# Patient Record
Sex: FEMALE | Race: WHITE | NOT HISPANIC OR LATINO | URBAN - METROPOLITAN AREA
[De-identification: names, ages, dates, MRNs, and addresses within clinical notes are randomized per-mention and may not be internally consistent; named-entity substitution may affect disease eponyms.]

---

## 2018-03-28 ENCOUNTER — EMERGENCY (EMERGENCY)
Facility: HOSPITAL | Age: 83
LOS: 1 days | Discharge: HOME | End: 2018-03-28
Attending: EMERGENCY MEDICINE | Admitting: EMERGENCY MEDICINE
Payer: MEDICARE

## 2018-03-28 VITALS
HEART RATE: 93 BPM | RESPIRATION RATE: 14 BRPM | SYSTOLIC BLOOD PRESSURE: 152 MMHG | OXYGEN SATURATION: 96 % | HEIGHT: 68 IN | WEIGHT: 115.96 LBS | DIASTOLIC BLOOD PRESSURE: 81 MMHG | TEMPERATURE: 98 F

## 2018-03-28 PROCEDURE — 70450 CT HEAD/BRAIN W/O DYE: CPT

## 2018-03-28 PROCEDURE — 72125 CT NECK SPINE W/O DYE: CPT

## 2018-03-28 PROCEDURE — 99284 EMERGENCY DEPT VISIT MOD MDM: CPT | Mod: 25

## 2018-03-28 PROCEDURE — 70450 CT HEAD/BRAIN W/O DYE: CPT | Mod: 26

## 2018-03-28 PROCEDURE — 72125 CT NECK SPINE W/O DYE: CPT | Mod: 26

## 2018-03-28 PROCEDURE — 99285 EMERGENCY DEPT VISIT HI MDM: CPT

## 2018-03-28 RX ORDER — TRAMADOL HYDROCHLORIDE 50 MG/1
50 TABLET ORAL ONCE
Qty: 0 | Refills: 0 | Status: DISCONTINUED | OUTPATIENT
Start: 2018-03-28 | End: 2018-03-28

## 2018-03-28 RX ADMIN — TRAMADOL HYDROCHLORIDE 50 MILLIGRAM(S): 50 TABLET ORAL at 13:47

## 2018-03-28 NOTE — ED PROVIDER NOTE - OBJECTIVE STATEMENT
83 y/o F pt with hx of dementia, HTN, depression, anxiety   presents to ED BIBA from [facility] for head pain, swelling to left cheek. Per nursing home they are unaware if pt fell. Hx limited from pt secondary to hx of dementia.

## 2018-03-28 NOTE — ED PROVIDER NOTE - CARE PLAN
Principal Discharge DX:	Chronic intractable headache, unspecified headache type  Goal:	evaluate the etiology of headache correlate with clinical and imaging study.

## 2018-03-28 NOTE — ED ADULT TRIAGE NOTE - CHIEF COMPLAINT QUOTE
"Patient c/o back and neck pain and left cheek is swollen per transfer sheet"  patient has dementia.

## 2018-03-28 NOTE — ED ADULT NURSE NOTE - OBJECTIVE STATEMENT
patient is from [facility], c/o neck and head pain, unable to say she has pain but when her bed's head of bed moved, she is crying and guarding, MD at bedside, Pt is in no acute distress. Pt's vital sign is within normal range. patient is afebrile, will continue to monitor.

## 2018-11-14 ENCOUNTER — INPATIENT (INPATIENT)
Facility: HOSPITAL | Age: 83
LOS: 15 days | Discharge: OTHER INSTITUTION | DRG: 300 | End: 2018-11-30
Attending: INTERNAL MEDICINE | Admitting: INTERNAL MEDICINE
Payer: MEDICARE

## 2018-11-14 VITALS
SYSTOLIC BLOOD PRESSURE: 148 MMHG | DIASTOLIC BLOOD PRESSURE: 83 MMHG | HEART RATE: 86 BPM | TEMPERATURE: 101 F | RESPIRATION RATE: 16 BRPM | OXYGEN SATURATION: 98 %

## 2018-11-14 DIAGNOSIS — R09.89 OTHER SPECIFIED SYMPTOMS AND SIGNS INVOLVING THE CIRCULATORY AND RESPIRATORY SYSTEMS: ICD-10-CM

## 2018-11-14 DIAGNOSIS — I82.409 ACUTE EMBOLISM AND THROMBOSIS OF UNSPECIFIED DEEP VEINS OF UNSPECIFIED LOWER EXTREMITY: ICD-10-CM

## 2018-11-14 LAB
ALBUMIN SERPL ELPH-MCNC: 2.9 G/DL — LOW (ref 3.3–5)
ALP SERPL-CCNC: 95 U/L — SIGNIFICANT CHANGE UP (ref 40–120)
ALT FLD-CCNC: 17 U/L — SIGNIFICANT CHANGE UP (ref 12–78)
ANION GAP SERPL CALC-SCNC: 9 MMOL/L — SIGNIFICANT CHANGE UP (ref 5–17)
APPEARANCE UR: CLEAR — SIGNIFICANT CHANGE UP
APTT BLD: 27.9 SEC — LOW (ref 28.5–37)
AST SERPL-CCNC: 23 U/L — SIGNIFICANT CHANGE UP (ref 15–37)
BACTERIA # UR AUTO: ABNORMAL
BASOPHILS # BLD AUTO: 0.03 K/UL — SIGNIFICANT CHANGE UP (ref 0–0.2)
BASOPHILS NFR BLD AUTO: 0.4 % — SIGNIFICANT CHANGE UP (ref 0–2)
BILIRUB SERPL-MCNC: 0.5 MG/DL — SIGNIFICANT CHANGE UP (ref 0.2–1.2)
BILIRUB UR-MCNC: NEGATIVE — SIGNIFICANT CHANGE UP
BUN SERPL-MCNC: 15 MG/DL — SIGNIFICANT CHANGE UP (ref 7–23)
CALCIUM SERPL-MCNC: 8.4 MG/DL — LOW (ref 8.5–10.1)
CHLORIDE SERPL-SCNC: 105 MMOL/L — SIGNIFICANT CHANGE UP (ref 96–108)
CO2 SERPL-SCNC: 26 MMOL/L — SIGNIFICANT CHANGE UP (ref 22–31)
COLOR SPEC: YELLOW — SIGNIFICANT CHANGE UP
CREAT SERPL-MCNC: 1 MG/DL — SIGNIFICANT CHANGE UP (ref 0.5–1.3)
DIFF PNL FLD: NEGATIVE — SIGNIFICANT CHANGE UP
EOSINOPHIL # BLD AUTO: 0.16 K/UL — SIGNIFICANT CHANGE UP (ref 0–0.5)
EOSINOPHIL NFR BLD AUTO: 2.3 % — SIGNIFICANT CHANGE UP (ref 0–6)
EPI CELLS # UR: SIGNIFICANT CHANGE UP
GLUCOSE SERPL-MCNC: 101 MG/DL — HIGH (ref 70–99)
GLUCOSE UR QL: NEGATIVE — SIGNIFICANT CHANGE UP
HCT VFR BLD CALC: 31.4 % — LOW (ref 34.5–45)
HGB BLD-MCNC: 10.9 G/DL — LOW (ref 11.5–15.5)
IMM GRANULOCYTES NFR BLD AUTO: 0.7 % — SIGNIFICANT CHANGE UP (ref 0–1.5)
INR BLD: 1.14 RATIO — SIGNIFICANT CHANGE UP (ref 0.88–1.16)
KETONES UR-MCNC: NEGATIVE — SIGNIFICANT CHANGE UP
LACTATE SERPL-SCNC: 1.3 MMOL/L — SIGNIFICANT CHANGE UP (ref 0.7–2)
LACTATE SERPL-SCNC: 2.4 MMOL/L — HIGH (ref 0.7–2)
LEUKOCYTE ESTERASE UR-ACNC: ABNORMAL
LYMPHOCYTES # BLD AUTO: 1.03 K/UL — SIGNIFICANT CHANGE UP (ref 1–3.3)
LYMPHOCYTES # BLD AUTO: 15.1 % — SIGNIFICANT CHANGE UP (ref 13–44)
MCHC RBC-ENTMCNC: 34.7 GM/DL — SIGNIFICANT CHANGE UP (ref 32–36)
MCHC RBC-ENTMCNC: 34.8 PG — HIGH (ref 27–34)
MCV RBC AUTO: 100.3 FL — HIGH (ref 80–100)
MONOCYTES # BLD AUTO: 0.98 K/UL — HIGH (ref 0–0.9)
MONOCYTES NFR BLD AUTO: 14.4 % — HIGH (ref 2–14)
NEUTROPHILS # BLD AUTO: 4.56 K/UL — SIGNIFICANT CHANGE UP (ref 1.8–7.4)
NEUTROPHILS NFR BLD AUTO: 67.1 % — SIGNIFICANT CHANGE UP (ref 43–77)
NITRITE UR-MCNC: NEGATIVE — SIGNIFICANT CHANGE UP
PH UR: 7 — SIGNIFICANT CHANGE UP (ref 5–8)
PLATELET # BLD AUTO: 222 K/UL — SIGNIFICANT CHANGE UP (ref 150–400)
POTASSIUM SERPL-MCNC: 3.8 MMOL/L — SIGNIFICANT CHANGE UP (ref 3.5–5.3)
POTASSIUM SERPL-SCNC: 3.8 MMOL/L — SIGNIFICANT CHANGE UP (ref 3.5–5.3)
PROT SERPL-MCNC: 6.9 G/DL — SIGNIFICANT CHANGE UP (ref 6–8.3)
PROT UR-MCNC: 25 MG/DL
PROTHROM AB SERPL-ACNC: 13.1 SEC — HIGH (ref 10–12.9)
RBC # BLD: 3.13 M/UL — LOW (ref 3.8–5.2)
RBC # FLD: 13.1 % — SIGNIFICANT CHANGE UP (ref 10.3–14.5)
SODIUM SERPL-SCNC: 140 MMOL/L — SIGNIFICANT CHANGE UP (ref 135–145)
SP GR SPEC: 1.01 — SIGNIFICANT CHANGE UP (ref 1.01–1.02)
UROBILINOGEN FLD QL: 1
WBC # BLD: 6.81 K/UL — SIGNIFICANT CHANGE UP (ref 3.8–10.5)
WBC # FLD AUTO: 6.81 K/UL — SIGNIFICANT CHANGE UP (ref 3.8–10.5)
WBC UR QL: SIGNIFICANT CHANGE UP

## 2018-11-14 PROCEDURE — 70450 CT HEAD/BRAIN W/O DYE: CPT | Mod: 26

## 2018-11-14 PROCEDURE — 93971 EXTREMITY STUDY: CPT | Mod: 26,RT

## 2018-11-14 PROCEDURE — 99285 EMERGENCY DEPT VISIT HI MDM: CPT

## 2018-11-14 PROCEDURE — 93010 ELECTROCARDIOGRAM REPORT: CPT

## 2018-11-14 PROCEDURE — 71045 X-RAY EXAM CHEST 1 VIEW: CPT | Mod: 26

## 2018-11-14 PROCEDURE — 72125 CT NECK SPINE W/O DYE: CPT | Mod: 26

## 2018-11-14 PROCEDURE — 71275 CT ANGIOGRAPHY CHEST: CPT | Mod: 26

## 2018-11-14 PROCEDURE — 70486 CT MAXILLOFACIAL W/O DYE: CPT | Mod: 26

## 2018-11-14 RX ORDER — PIPERACILLIN AND TAZOBACTAM 4; .5 G/20ML; G/20ML
3.38 INJECTION, POWDER, LYOPHILIZED, FOR SOLUTION INTRAVENOUS ONCE
Qty: 0 | Refills: 0 | Status: COMPLETED | OUTPATIENT
Start: 2018-11-14 | End: 2018-11-14

## 2018-11-14 RX ORDER — PIPERACILLIN AND TAZOBACTAM 4; .5 G/20ML; G/20ML
3.38 INJECTION, POWDER, LYOPHILIZED, FOR SOLUTION INTRAVENOUS EVERY 8 HOURS
Qty: 0 | Refills: 0 | Status: DISCONTINUED | OUTPATIENT
Start: 2018-11-14 | End: 2018-11-15

## 2018-11-14 RX ORDER — PANTOPRAZOLE SODIUM 20 MG/1
40 TABLET, DELAYED RELEASE ORAL
Qty: 0 | Refills: 0 | Status: DISCONTINUED | OUTPATIENT
Start: 2018-11-14 | End: 2018-11-28

## 2018-11-14 RX ORDER — SERTRALINE 25 MG/1
25 TABLET, FILM COATED ORAL DAILY
Qty: 0 | Refills: 0 | Status: DISCONTINUED | OUTPATIENT
Start: 2018-11-14 | End: 2018-11-30

## 2018-11-14 RX ORDER — HALOPERIDOL DECANOATE 100 MG/ML
5 INJECTION INTRAMUSCULAR ONCE
Qty: 0 | Refills: 0 | Status: COMPLETED | OUTPATIENT
Start: 2018-11-14 | End: 2018-11-14

## 2018-11-14 RX ORDER — ENOXAPARIN SODIUM 100 MG/ML
50 INJECTION SUBCUTANEOUS ONCE
Qty: 0 | Refills: 0 | Status: COMPLETED | OUTPATIENT
Start: 2018-11-14 | End: 2018-11-14

## 2018-11-14 RX ORDER — SERTRALINE 25 MG/1
50 TABLET, FILM COATED ORAL DAILY
Qty: 0 | Refills: 0 | Status: DISCONTINUED | OUTPATIENT
Start: 2018-11-14 | End: 2018-11-30

## 2018-11-14 RX ORDER — MIRTAZAPINE 45 MG/1
1 TABLET, ORALLY DISINTEGRATING ORAL
Qty: 0 | Refills: 0 | COMMUNITY

## 2018-11-14 RX ORDER — ENOXAPARIN SODIUM 100 MG/ML
50 INJECTION SUBCUTANEOUS
Qty: 0 | Refills: 0 | Status: DISCONTINUED | OUTPATIENT
Start: 2018-11-15 | End: 2018-11-16

## 2018-11-14 RX ORDER — MIRTAZAPINE 45 MG/1
15 TABLET, ORALLY DISINTEGRATING ORAL AT BEDTIME
Qty: 0 | Refills: 0 | Status: DISCONTINUED | OUTPATIENT
Start: 2018-11-14 | End: 2018-11-30

## 2018-11-14 RX ORDER — DOCUSATE SODIUM 100 MG
100 CAPSULE ORAL
Qty: 0 | Refills: 0 | Status: DISCONTINUED | OUTPATIENT
Start: 2018-11-14 | End: 2018-11-30

## 2018-11-14 RX ORDER — VANCOMYCIN HCL 1 G
1000 VIAL (EA) INTRAVENOUS ONCE
Qty: 0 | Refills: 0 | Status: COMPLETED | OUTPATIENT
Start: 2018-11-14 | End: 2018-11-14

## 2018-11-14 RX ORDER — VANCOMYCIN HCL 1 G
1000 VIAL (EA) INTRAVENOUS EVERY 12 HOURS
Qty: 0 | Refills: 0 | Status: DISCONTINUED | OUTPATIENT
Start: 2018-11-14 | End: 2018-11-14

## 2018-11-14 RX ORDER — ENOXAPARIN SODIUM 100 MG/ML
50 INJECTION SUBCUTANEOUS ONCE
Qty: 0 | Refills: 0 | Status: DISCONTINUED | OUTPATIENT
Start: 2018-11-14 | End: 2018-11-14

## 2018-11-14 RX ORDER — SODIUM CHLORIDE 9 MG/ML
1000 INJECTION INTRAMUSCULAR; INTRAVENOUS; SUBCUTANEOUS ONCE
Qty: 0 | Refills: 0 | Status: COMPLETED | OUTPATIENT
Start: 2018-11-14 | End: 2018-11-14

## 2018-11-14 RX ORDER — VANCOMYCIN HCL 1 G
1000 VIAL (EA) INTRAVENOUS EVERY 24 HOURS
Qty: 0 | Refills: 0 | Status: DISCONTINUED | OUTPATIENT
Start: 2018-11-15 | End: 2018-11-15

## 2018-11-14 RX ORDER — AMPICILLIN SODIUM AND SULBACTAM SODIUM 250; 125 MG/ML; MG/ML
3 INJECTION, POWDER, FOR SUSPENSION INTRAMUSCULAR; INTRAVENOUS ONCE
Qty: 0 | Refills: 0 | Status: DISCONTINUED | OUTPATIENT
Start: 2018-11-14 | End: 2018-11-14

## 2018-11-14 RX ADMIN — HALOPERIDOL DECANOATE 5 MILLIGRAM(S): 100 INJECTION INTRAMUSCULAR at 20:01

## 2018-11-14 RX ADMIN — PIPERACILLIN AND TAZOBACTAM 3.38 GRAM(S): 4; .5 INJECTION, POWDER, LYOPHILIZED, FOR SOLUTION INTRAVENOUS at 19:04

## 2018-11-14 RX ADMIN — SODIUM CHLORIDE 1000 MILLILITER(S): 9 INJECTION INTRAMUSCULAR; INTRAVENOUS; SUBCUTANEOUS at 20:03

## 2018-11-14 RX ADMIN — SODIUM CHLORIDE 1000 MILLILITER(S): 9 INJECTION INTRAMUSCULAR; INTRAVENOUS; SUBCUTANEOUS at 18:34

## 2018-11-14 RX ADMIN — ENOXAPARIN SODIUM 50 MILLIGRAM(S): 100 INJECTION SUBCUTANEOUS at 20:01

## 2018-11-14 RX ADMIN — PIPERACILLIN AND TAZOBACTAM 200 GRAM(S): 4; .5 INJECTION, POWDER, LYOPHILIZED, FOR SOLUTION INTRAVENOUS at 18:34

## 2018-11-14 RX ADMIN — Medication 250 MILLIGRAM(S): at 23:41

## 2018-11-14 NOTE — H&P ADULT - HISTORY OF PRESENT ILLNESS
pt is a 81yo female with pm[facility]x of [facility] ems reports pt tripped over someone else's walker, pt walks without assistance at baseline. pt found to be febrile on arrival 100.9 Admitted for septic workup and evaluation,send blood and urine cx,serial lactate levels,monitor vitals closley,ivfs hydration,monitor urine output and renal profile,iv abx initiated  pt without complaints. Daughter is at bedside and states patient has a steady gait Found to have DVT and started on full dose AC

## 2018-11-14 NOTE — ED PROVIDER NOTE - ATTENDING CONTRIBUTION TO CARE
81 yo female hx of dementia, sent from [place] s/p fall, found to be febrile 100.9, here with family c/o right lower leg swelling and redness.  Unable to obtain history from patient secondary to dementia    Gen: demented  Head/eyes: NC/AT  ENT: airway patent  Neck: supple, no tenderness/meningismus/JVD, Trachea midline  Pulm: Bilateral clear BS, normal resp effort, no wheeze/stridor/retractions  CV: RRR, no M/R/G, +2 dist pulses (radial, pedal DP/PT, popliteal)  Abd: soft, NT/ND, +BS, no guarding/rebound tenderness  Musculoskeletal: no edema/erythema/cyanosis, FROM in all extremities, no C/T/L spine ttp  Skin: RLE +swelling, +erythema + warmth  Neuro: demented, moving all extremities    US +right LE DVT, CTA chest +right lower lobe pna, IV abx, lovenox, admit

## 2018-11-14 NOTE — ED PROVIDER NOTE - OBJECTIVE STATEMENT
pt is a 81yo [facility] ems reports pt tripped over someone else's walker, pt walks without assistance at baseline. pt found to be febrile on arrival 100.9. pt without complaints.   pmd: irving

## 2018-11-14 NOTE — ED ADULT NURSE NOTE - OBJECTIVE STATEMENT
Assumed patient care @ 1820. Pt received sitting on stretcher in no apparent distress. Pt AOx1 to self s/p fall, sent by [facility] for fever and fall. Patient with swelling to the left cheek and right lower extremity with redness. Patient follows commands but does not answer questions appropriately. Lungs clear to ausculation, respirations even unlabored. Abd soft non tender, + bowel sounds x 4quadrants. No deformity, Nausea, Vomiting, Diarrhea. Skin warm, dry, color appropriate for age and race except redness to  RLE with swelling and warmth.

## 2018-11-14 NOTE — H&P ADULT - NSHPLABSRESULTS_GEN_ALL_CORE
10.9   6.81  )-----------( 222      ( 2018 18:30 )             31.4       CBC Full  -  ( 2018 18:30 )  WBC Count : 6.81 K/uL  Hemoglobin : 10.9 g/dL  Hematocrit : 31.4 %  Platelet Count - Automated : 222 K/uL  Mean Cell Volume : 100.3 fl  Mean Cell Hemoglobin : 34.8 pg  Mean Cell Hemoglobin Concentration : 34.7 gm/dL  Auto Neutrophil # : 4.56 K/uL  Auto Lymphocyte # : 1.03 K/uL  Auto Monocyte # : 0.98 K/uL  Auto Eosinophil # : 0.16 K/uL  Auto Basophil # : 0.03 K/uL  Auto Neutrophil % : 67.1 %  Auto Lymphocyte % : 15.1 %  Auto Monocyte % : 14.4 %  Auto Eosinophil % : 2.3 %  Auto Basophil % : 0.4 %          140  |  105  |  15  ----------------------------<  101<H>  3.8   |  26  |  1.00    Ca    8.4<L>      2018 18:30    TPro  6.9  /  Alb  2.9<L>  /  TBili  0.5  /  DBili  x   /  AST  23  /  ALT  17  /  AlkPhos  95  11-14      CAPILLARY BLOOD GLUCOSE          Vital Signs Last 24 Hrs  T(C): 38.3 (2018 18:00), Max: 38.3 (2018 18:00)  T(F): 100.9 (2018 18:00), Max: 100.9 (2018 18:00)  HR: 86 (2018 18:00) (86 - 86)  BP: 148/83 (2018 18:00) (148/83 - 148/83)  BP(mean): --  RR: 16 (2018 18:00) (16 - 16)  SpO2: 98% (2018 18:00) (98% - 98%)    Urinalysis Basic - ( 2018 18:37 )    Color: Yellow / Appearance: Clear / S.010 / pH: x  Gluc: x / Ketone: Negative  / Bili: Negative / Urobili: 1   Blood: x / Protein: 25 mg/dL / Nitrite: Negative   Leuk Esterase: Trace / RBC: x / WBC 0-2   Sq Epi: x / Non Sq Epi: Occasional / Bacteria: Occasional        PT/INR - ( 2018 18:30 )   PT: 13.1 sec;   INR: 1.14 ratio         PTT - ( 2018 18:30 )  PTT:27.9 sec

## 2018-11-14 NOTE — ED PROVIDER NOTE - PROGRESS NOTE DETAILS
+ dvt will do cta and follow up. will admit. + dvt will do cta and follow up. will admit. if all neg will give lovenox. attending spoke to dr parra who advised to admit to [facility].

## 2018-11-14 NOTE — H&P ADULT - BREASTS
Pt is here today for counseling on hormone replacement, pt states she has no sex drive, no energy and fat around her abdominal area; also states she is in danger of losing a 17 year relationship due to the low sex drive, had total hysterectomy in August of 2014, is on Estrace 2mg per Dr Garcia, told to see GYN for further adjustments, discussed w/ pt I do not adjust pt's on hormone therapy, will need to see an MD  
not examined

## 2018-11-14 NOTE — H&P ADULT - ASSESSMENT
pt is a 83yo [facility] ems reports pt tripped over someone else's walker, pt walks without assistance at baseline. pt found to be febrile on arrival 100.9 Admitted for septic workup and evaluation,send blood and urine cx,serial lactate levels,monitor vitals closley,ivfs hydration,monitor urine output and renal profile,iv abx initiated  pt without complaints. Daughter is at bedside and states patient has a steady gait Found to have DVT and started on full dose AC

## 2018-11-14 NOTE — H&P ADULT - ATTENDING COMMENTS
pt is a 81yo [facility] ems reports pt tripped over someone else's walker, pt walks without assistance at baseline. pt found to be febrile on arrival 100.9 Admitted for septic workup and evaluation,send blood and urine cx,serial lactate levels,monitor vitals closley,ivfs hydration,monitor urine output and renal profile,iv abx initiated  pt without complaints. Daughter is at bedside and states patient has a steady gait Found to have DVT and started on full dose AC

## 2018-11-14 NOTE — H&P ADULT - REASON FOR ADMISSION
pt is a 83yo female [facility] ems reports pt tripped over someone else's walker, pt walks without assistance at baseline. pt found to be febrile on arrival 100.9 Admitted for septic workup and evaluation,send blood and urine cx,serial lactate levels,monitor vitals closley,ivfs hydration,monitor urine output and renal profile,iv abx initiated  pt without complaints. Daughter is at bedside and states patient has a steady gait Found to have DVT and started on full dose AC

## 2018-11-14 NOTE — ED PROVIDER NOTE - PHYSICAL EXAMINATION
Spine Exam:     Cervical: No erythema, ecchymosis, or visible deformity. No midline tenderness or step-off appreciated on palpation. No paravertebral tenderness. No muscle spasm. FROM. NEG NEXUS criteria.

## 2018-11-14 NOTE — ED ADULT TRIAGE NOTE - CHIEF COMPLAINT QUOTE
From NH, trip and fall over walker, edema to left side of face. Pt found to be febrile upon obtaining vitals

## 2018-11-15 DIAGNOSIS — J18.9 PNEUMONIA, UNSPECIFIED ORGANISM: ICD-10-CM

## 2018-11-15 DIAGNOSIS — Z29.9 ENCOUNTER FOR PROPHYLACTIC MEASURES, UNSPECIFIED: ICD-10-CM

## 2018-11-15 DIAGNOSIS — F03.90 UNSPECIFIED DEMENTIA WITHOUT BEHAVIORAL DISTURBANCE: ICD-10-CM

## 2018-11-15 DIAGNOSIS — F41.9 ANXIETY DISORDER, UNSPECIFIED: ICD-10-CM

## 2018-11-15 DIAGNOSIS — F32.9 MAJOR DEPRESSIVE DISORDER, SINGLE EPISODE, UNSPECIFIED: ICD-10-CM

## 2018-11-15 DIAGNOSIS — I82.409 ACUTE EMBOLISM AND THROMBOSIS OF UNSPECIFIED DEEP VEINS OF UNSPECIFIED LOWER EXTREMITY: ICD-10-CM

## 2018-11-15 DIAGNOSIS — N17.9 ACUTE KIDNEY FAILURE, UNSPECIFIED: ICD-10-CM

## 2018-11-15 DIAGNOSIS — I10 ESSENTIAL (PRIMARY) HYPERTENSION: ICD-10-CM
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
## 2018-11-15 LAB
ALBUMIN SERPL ELPH-MCNC: 2.8 G/DL — LOW (ref 3.3–5)
ALP SERPL-CCNC: 89 U/L — SIGNIFICANT CHANGE UP (ref 40–120)
ALT FLD-CCNC: 17 U/L — SIGNIFICANT CHANGE UP (ref 12–78)
ANION GAP SERPL CALC-SCNC: 10 MMOL/L — SIGNIFICANT CHANGE UP (ref 5–17)
AST SERPL-CCNC: 26 U/L — SIGNIFICANT CHANGE UP (ref 15–37)
BILIRUB SERPL-MCNC: 0.9 MG/DL — SIGNIFICANT CHANGE UP (ref 0.2–1.2)
BUN SERPL-MCNC: 13 MG/DL — SIGNIFICANT CHANGE UP (ref 7–23)
CALCIUM SERPL-MCNC: 8.6 MG/DL — SIGNIFICANT CHANGE UP (ref 8.5–10.1)
CHLORIDE SERPL-SCNC: 110 MMOL/L — HIGH (ref 96–108)
CHOLEST SERPL-MCNC: 161 MG/DL — SIGNIFICANT CHANGE UP (ref 10–199)
CK SERPL-CCNC: 151 U/L — SIGNIFICANT CHANGE UP (ref 26–192)
CO2 SERPL-SCNC: 24 MMOL/L — SIGNIFICANT CHANGE UP (ref 22–31)
CREAT SERPL-MCNC: 1.5 MG/DL — HIGH (ref 0.5–1.3)
CULTURE RESULTS: NO GROWTH — SIGNIFICANT CHANGE UP
GLUCOSE SERPL-MCNC: 130 MG/DL — HIGH (ref 70–99)
HCT VFR BLD CALC: 30 % — LOW (ref 34.5–45)
HDLC SERPL-MCNC: 39 MG/DL — LOW
HGB BLD-MCNC: 10.2 G/DL — LOW (ref 11.5–15.5)
INR BLD: 1.26 RATIO — HIGH (ref 0.88–1.16)
LIDOCAIN IGE QN: 87 U/L — SIGNIFICANT CHANGE UP (ref 73–393)
LIPID PNL WITH DIRECT LDL SERPL: 106 MG/DL — SIGNIFICANT CHANGE UP
MAGNESIUM SERPL-MCNC: 2.2 MG/DL — SIGNIFICANT CHANGE UP (ref 1.6–2.6)
MCHC RBC-ENTMCNC: 34 GM/DL — SIGNIFICANT CHANGE UP (ref 32–36)
MCHC RBC-ENTMCNC: 34.1 PG — HIGH (ref 27–34)
MCV RBC AUTO: 100.3 FL — HIGH (ref 80–100)
NRBC # BLD: 0 /100 WBCS — SIGNIFICANT CHANGE UP (ref 0–0)
PHOSPHATE SERPL-MCNC: 3.2 MG/DL — SIGNIFICANT CHANGE UP (ref 2.5–4.5)
PLATELET # BLD AUTO: 195 K/UL — SIGNIFICANT CHANGE UP (ref 150–400)
POTASSIUM SERPL-MCNC: 3.5 MMOL/L — SIGNIFICANT CHANGE UP (ref 3.5–5.3)
POTASSIUM SERPL-SCNC: 3.5 MMOL/L — SIGNIFICANT CHANGE UP (ref 3.5–5.3)
PROCALCITONIN SERPL-MCNC: <0.05 — SIGNIFICANT CHANGE UP (ref 0–0.04)
PROT SERPL-MCNC: 6.5 G/DL — SIGNIFICANT CHANGE UP (ref 6–8.3)
PROTHROM AB SERPL-ACNC: 14.3 SEC — HIGH (ref 10–12.9)
RBC # BLD: 2.99 M/UL — LOW (ref 3.8–5.2)
RBC # FLD: 13 % — SIGNIFICANT CHANGE UP (ref 10.3–14.5)
SODIUM SERPL-SCNC: 144 MMOL/L — SIGNIFICANT CHANGE UP (ref 135–145)
SPECIMEN SOURCE: SIGNIFICANT CHANGE UP
TOTAL CHOLESTEROL/HDL RATIO MEASUREMENT: 4.1 RATIO — SIGNIFICANT CHANGE UP (ref 3.3–7.1)
TRIGL SERPL-MCNC: 79 MG/DL — SIGNIFICANT CHANGE UP (ref 10–149)
TROPONIN I SERPL-MCNC: 0.04 NG/ML — SIGNIFICANT CHANGE UP (ref 0.01–0.04)
WBC # BLD: 8.64 K/UL — SIGNIFICANT CHANGE UP (ref 3.8–10.5)
WBC # FLD AUTO: 8.64 K/UL — SIGNIFICANT CHANGE UP (ref 3.8–10.5)

## 2018-11-15 RX ORDER — HALOPERIDOL DECANOATE 100 MG/ML
1 INJECTION INTRAMUSCULAR EVERY 6 HOURS
Qty: 0 | Refills: 0 | Status: DISCONTINUED | OUTPATIENT
Start: 2018-11-15 | End: 2018-11-20

## 2018-11-15 RX ORDER — PIPERACILLIN AND TAZOBACTAM 4; .5 G/20ML; G/20ML
3.38 INJECTION, POWDER, LYOPHILIZED, FOR SOLUTION INTRAVENOUS EVERY 8 HOURS
Qty: 0 | Refills: 0 | Status: DISCONTINUED | OUTPATIENT
Start: 2018-11-15 | End: 2018-11-15

## 2018-11-15 RX ADMIN — SERTRALINE 25 MILLIGRAM(S): 25 TABLET, FILM COATED ORAL at 12:06

## 2018-11-15 RX ADMIN — MIRTAZAPINE 15 MILLIGRAM(S): 45 TABLET, ORALLY DISINTEGRATING ORAL at 01:24

## 2018-11-15 RX ADMIN — Medication 100 MILLIGRAM(S): at 18:36

## 2018-11-15 RX ADMIN — HALOPERIDOL DECANOATE 1 MILLIGRAM(S): 100 INJECTION INTRAMUSCULAR at 13:14

## 2018-11-15 RX ADMIN — ENOXAPARIN SODIUM 50 MILLIGRAM(S): 100 INJECTION SUBCUTANEOUS at 06:33

## 2018-11-15 RX ADMIN — ENOXAPARIN SODIUM 50 MILLIGRAM(S): 100 INJECTION SUBCUTANEOUS at 18:36

## 2018-11-15 RX ADMIN — PIPERACILLIN AND TAZOBACTAM 25 GRAM(S): 4; .5 INJECTION, POWDER, LYOPHILIZED, FOR SOLUTION INTRAVENOUS at 01:24

## 2018-11-15 RX ADMIN — PANTOPRAZOLE SODIUM 40 MILLIGRAM(S): 20 TABLET, DELAYED RELEASE ORAL at 06:33

## 2018-11-15 RX ADMIN — SERTRALINE 50 MILLIGRAM(S): 25 TABLET, FILM COATED ORAL at 12:06

## 2018-11-15 RX ADMIN — HALOPERIDOL DECANOATE 1 MILLIGRAM(S): 100 INJECTION INTRAMUSCULAR at 19:36

## 2018-11-15 NOTE — GOALS OF CARE CONVERSATION - PERSONAL ADVANCE DIRECTIVE - CONVERSATION DETAILS
Left message for pts HCP to return call to address advance directives Left message for pts HCP to return call to address advance directives  Spoke with dtr/HCP 11/16/18 she consented to [facility]OLST

## 2018-11-15 NOTE — PROGRESS NOTE ADULT - SUBJECTIVE AND OBJECTIVE BOX
Patient is a 82y Female with past medical history of   increase of BUN/creatinine        presenting with   possible acute kidney injury     who reports no complaints overnight.    REVIEW OF SYSTEMS:    unable to obtain review of system  Allergies    No Known Allergies    Intolerances        MEDICATIONS  (STANDING):  docusate sodium 100 milliGRAM(s) Oral two times a day  enoxaparin Injectable 50 milliGRAM(s) SubCutaneous two times a day  mirtazapine 15 milliGRAM(s) Oral at bedtime  pantoprazole    Tablet 40 milliGRAM(s) Oral before breakfast  sertraline 25 milliGRAM(s) Oral daily  sertraline 50 milliGRAM(s) Oral daily      Vitals:  T(F): 98.6 (11-15-18 @ 14:09), Max: 98.6 (11-15-18 @ 14:09)  HR: 98 (11-15-18 @ 14:09)  BP: 148/81 (11-15-18 @ 14:09)  BP(mean): --  RR: 17 (11-15-18 @ 14:09)  SpO2: 96% (11-15-18 @ 14:09)  Wt(kg): --    I and O's:     @ 07:01  -  11-15 @ 07:00  --------------------------------------------------------  IN: 370 mL / OUT: 0 mL / NET: 370 mL    11-15 @ 07:01  -  11-15 @ 20:16  --------------------------------------------------------  IN: 120 mL / OUT: 0 mL / NET: 120 mL            PHYSICAL EXAM:    Constitutional: NAD,   HEENT:  conjunctiva clear   Neck: No JVD  Respiratory: CTAB  Cardiovascular: S1 and S2  Gastrointestinal: BS+, soft, NT/ND  Extremities: No peripheral edema  Neurological: unable to obtain  Psychiatric: unable to obtain  : No Guzman  Skin: dry  Dialysis Access: Not applicable    LABS:                        10.2   8.64  )-----------( 195      ( 15 Nov 2018 08:19 )             30.0                         10.9   6.81  )-----------( 222      ( 2018 18:30 )             31.4       144    |  110    |  13     ----------------------------<  130       15 Nov 2018 08:19  3.5     |  24     |  1.50     140    |  105    |  15     ----------------------------<  101       2018 18:30  3.8     |  26     |  1.00     Ca    8.6        15 Nov 2018 08:19  Ca    8.4        2018 18:30    Phos  3.2       15 Nov 2018 08:19    Mg     2.2       15 Nov 2018 08:19    TPro  6.5    /  Alb  2.8    /  TBili  0.9    /        15 Nov 2018 08:19  DBili  x      /  AST  26     /  ALT  17     /  AlkPhos  89       TPro  6.9    /  Alb  2.9    /  TBili  0.5    /        2018 18:30  DBili  x      /  AST  23     /  ALT  17     /  AlkPhos  95           Serum Osmo:   Serum Uric Acid:   VIOLET:   Double Stranded DNA:   C3:   C3 Nephritic Factor:   C4:   p-ANCA:   c-ANCA:   Anti-GBM:   SAI-Smith Antibody:   Immunofixation:   [place]/Lambda Free Light Chain:   SPEP:   Hepatitis Panel:   HIV-1/2:     Urine Studies:  Urinalysis Basic - ( 2018 18:37 )    Color: Yellow / Appearance: Clear / S.010 / pH: x  Gluc: x / Ketone: Negative  / Bili: Negative / Urobili: 1   Blood: x / Protein: 25 mg/dL / Nitrite: Negative   Leuk Esterase: Trace / RBC: x / WBC 0-2   Sq Epi: x / Non Sq Epi: Occasional / Bacteria: Occasional        Urine chemistry:   Urine Na:   Urine Creatinine:   Urine Protein/Cr ratio:  Urine K:   Urine Osm:   24 Hr urine studies:     24 hr urine Creatinine Clearance:    RADIOLOGY & ADDITIONAL STUDIES:

## 2018-11-15 NOTE — CONSULT NOTE ADULT - SUBJECTIVE AND OBJECTIVE BOX
CARDIOLOGY CONSULT NOTE    Patient is a 82y Female with a known history of : Admitted with fall and found to have dvt rt leg  Suspected deep vein thrombosis ([id number])    HPI:      REVIEW OF SYSTEMS:    CONSTITUTIONAL: No fever, weight loss, or fatigue  EYES: No eye pain, visual disturbances, or discharge  ENMT:  No difficulty hearing, tinnitus, vertigo; No sinus or throat pain  NECK: No pain or stiffness  BREASTS: No pain, masses, or nipple discharge  RESPIRATORY: No cough, wheezing, chills or hemoptysis; No shortness of breath  CARDIOVASCULAR: No chest pain, palpitations, dizziness, or leg swelling  GASTROINTESTINAL: No abdominal or epigastric pain. No nausea, vomiting, or hematemesis; No diarrhea or constipation. No melena or hematochezia.  GENITOURINARY: No dysuria, frequency, hematuria, or incontinence  NEUROLOGICAL: No headaches, memory loss, loss of strength, numbness, or tremors  SKIN: No itching, burning, rashes, or lesions   LYMPH NODES: No enlarged glands  ENDOCRINE: No heat or cold intolerance; No hair loss  MUSCULOSKELETAL: No joint pain or swelling; No muscle, back, or extremity pain  PSYCHIATRIC: No depression, anxiety, mood swings, or difficulty sleeping  HEME/LYMPH: No easy bruising, or bleeding gums  ALLERGY AND IMMUNOLOGIC: No hives or eczema    MEDICATIONS  (STANDING):  docusate sodium 100 milliGRAM(s) Oral two times a day  enoxaparin Injectable 50 milliGRAM(s) SubCutaneous two times a day  mirtazapine 15 milliGRAM(s) Oral at bedtime  pantoprazole    Tablet 40 milliGRAM(s) Oral before breakfast  piperacillin/tazobactam IVPB. 3.375 Gram(s) IV Intermittent every 8 hours  sertraline 25 milliGRAM(s) Oral daily  sertraline 50 milliGRAM(s) Oral daily  vancomycin  IVPB 1000 milliGRAM(s) IV Intermittent every 24 hours    MEDICATIONS  (PRN):      ALLERGIES: No Known Allergies      Social History:     FAMILY HISTORY:  No pertinent family history in first degree relatives      PAST MEDICAL & SURGICAL HISTORY:  Anxiety  HTN (hypertension)  Depression  Dementia  No significant past surgical history        PHYSICAL EXAMINATION:  -----------------------------  T(C): 36.6 (11-15-18 @ 05:23), Max: 38.3 (11-14-18 @ 18:00)  HR: 84 (11-15-18 @ 05:23) (84 - 100)  BP: 147/69 (11-15-18 @ 05:23) (136/82 - 170/74)  RR: 18 (11-15-18 @ 05:23) (16 - 19)  SpO2: 96% (11-15-18 @ 05:23) (96% - 98%)  Wt(kg): --    11-14 @ 07:01  -  11-15 @ 07:00  --------------------------------------------------------  IN:    IV PiggyBack: 250 mL    Oral Fluid: 120 mL  Total IN: 370 mL    OUT:  Total OUT: 0 mL    Total NET: 370 mL        Height (cm): 172.72 (11-14 @ 18:55)  Weight (kg): 52.6 (11-14 @ 18:55)  BMI (kg/m2): 17.6 (11-14 @ 18:55)  BSA (m2): 1.62 (11-14 @ 18:55)    Constitutional: well developed, normal appearance, well groomed, well nourished, no deformities and no acute distress.   Eyes: the conjunctiva exhibited no abnormalities and the eyelids demonstrated no xanthelasmas.   HEENT: normal oral mucosa, no oral pallor and no oral cyanosis.   Neck: normal jugular venous A waves present, normal jugular venous V waves present and no jugular venous lao A waves.   Pulmonary: no respiratory distress, normal respiratory rhythm and effort, no accessory muscle use and lungs were clear to auscultation bilaterally.   Cardiovascular: heart rate and rhythm were normal, normal S1 and S2 and no murmur, gallop, rub, heave or thrill are present.   Abdomen: soft, non-tender, no hepato-splenomegaly and no abdominal mass palpated.   Musculoskeletal: the gait could not be assessed..   Extremities: no clubbing of the fingernails, no localized cyanosis, no petechial hemorrhages and no ischemic changes.   Skin: normal skin color and pigmentation, no rash, no venous stasis, no skin lesions, no skin ulcer and no xanthoma was observed.   Psychiatric: oriented to person, place, and time, the affect was normal, the mood was normal and not feeling anxious.     LABS:   --------  11-15    144  |  110<H>  |  13  ----------------------------<  130<H>  3.5   |  24  |  1.50<H>    Ca    8.6      15 Nov 2018 08:19  Phos  3.2     11-15  Mg     2.2     11-15    TPro  6.5  /  Alb  2.8<L>  /  TBili  0.9  /  DBili  x   /  AST  26  /  ALT  17  /  AlkPhos  89  11-15                         10.2   8.64  )-----------( 195      ( 15 Nov 2018 08:19 )             30.0     PT/INR - ( 15 Nov 2018 08:19 )   PT: 14.3 sec;   INR: 1.26 ratio         PTT - ( 14 Nov 2018 18:30 )  PTT:27.9 sec    11-15 @ 08:19 CPK total:--, CKMB --, Troponin I - .035 ng/mL          RADIOLOGY:  -----------------        ECG:     ECHO

## 2018-11-15 NOTE — CONSULT NOTE ADULT - SUBJECTIVE AND OBJECTIVE BOX
Patient is a 82y old  Female who presents with a chief complaint of pt is a 81yo female with pmhx of dementia and htn [facility] ems s/p fall. ems reports pt tripped over someone else's walker, pt walks without assistance at baseline. pt found to be febrile on arrival 100.9 Admitted for septic workup and evaluation,send blood and urine cx,serial lactate levels,monitor vitals closley,ivfs hydration,monitor urine output and renal profile,iv abx initiated  pt without complaints. Daughter is at bedside and states patient has a steady gait Found to have DVT and started on full dose AC (15 Nov 2018 20:16)      HPI:  pt is a 81yo female with pmhx of dementia and htn [facility] ems s/p fall. ems reports pt tripped over someone else's walker, pt walks without assistance at baseline. pt found to be febrile on arrival 100.9 Admitted for septic workup and evaluation,send blood and urine cx,serial lactate levels,monitor vitals closley,ivfs hydration,monitor urine output and renal profile,iv abx initiated  pt without complaints. Daughter is at bedside and states patient has a steady gait Found to have DVT and started on full dose AC (14 Nov 2018 21:23)      Asked to see patient for ID Consult    PAST MEDICAL & SURGICAL HISTORY:  Anxiety  HTN (hypertension)  Depression  Dementia  No significant past surgical history      Allergies    No Known Allergies    Intolerances        REVIEW OF SYSTEMS:  All systems below were reviewed and are negative [  ]  HEENT:  ID:  Pulmonary:  Cardiac:  GI:  Renal:  Musculoskeletal:  All other systems above were reviewed and are negative   [  ]    HOME MEDICATIONS:    MEDICATIONS  (STANDING):  docusate sodium 100 milliGRAM(s) Oral two times a day  enoxaparin Injectable 50 milliGRAM(s) SubCutaneous two times a day  mirtazapine 15 milliGRAM(s) Oral at bedtime  pantoprazole    Tablet 40 milliGRAM(s) Oral before breakfast  sertraline 25 milliGRAM(s) Oral daily  sertraline 50 milliGRAM(s) Oral daily    MEDICATIONS  (PRN):  haloperidol    Injectable 1 milliGRAM(s) IntraMuscular every 6 hours PRN Agitation      Vital Signs Last 24 Hrs  T(C): 37.1 (15 Nov 2018 21:54), Max: 37.1 (15 Nov 2018 21:54)  T(F): 98.7 (15 Nov 2018 21:54), Max: 98.7 (15 Nov 2018 21:54)  HR: 102 (15 Nov 2018 21:54) (84 - 102)  BP: 125/68 (15 Nov 2018 21:54) (125/68 - 148/81)  BP(mean): --  RR: 18 (15 Nov 2018 21:54) (17 - 19)  SpO2: 97% (15 Nov 2018 21:54) (96% - 97%)    PHYSICAL EXAM:  HEENT:  Neck:  Lungs:  Heart:  Abdomen:  Genital/ Rectal:  Extremities:  Neurologic:  Vascular:    I&O's Summary    14 Nov 2018 07:01  -  15 Nov 2018 07:00  --------------------------------------------------------  IN: 370 mL / OUT: 0 mL / NET: 370 mL    15 Nov 2018 07:01  -  15 Nov 2018 22:12  --------------------------------------------------------  IN: 120 mL / OUT: 0 mL / NET: 120 mL        LABORATORY:                          10.2   8.64  )-----------( 195      ( 15 Nov 2018 08:19 )             30.0       ESR:                   11-15 @ 08:19  --    C-Reactive Protein:     11-15 @ 08:19  --    Procalcitonin:           11-15 @ 08:19   <0.05      11-15    144  |  110<H>  |  13  ----------------------------<  130<H>  3.5   |  24  |  1.50<H>    Ca    8.6      15 Nov 2018 08:19  Phos  3.2     11-15  Mg     2.2     11-15    TPro  6.5  /  Alb  2.8<L>  /  TBili  0.9  /  DBili  x   /  AST  26  /  ALT  17  /  AlkPhos  89  11-15          LABORATORY:    CBC Full  -  ( 15 Nov 2018 08:19 )  WBC Count : 8.64 K/uL  Hemoglobin : 10.2 g/dL  Hematocrit : 30.0 %  Platelet Count - Automated : 195 K/uL  Mean Cell Volume : 100.3 fl  Mean Cell Hemoglobin : 34.1 pg  Mean Cell Hemoglobin Concentration : 34.0 gm/dL  Auto Neutrophil # : x  Auto Lymphocyte # : x  Auto Monocyte # : x  Auto Eosinophil # : x  Auto Basophil # : x  Auto Neutrophil % : x  Auto Lymphocyte % : x  Auto Monocyte % : x  Auto Eosinophil % : x  Auto Basophil % : x        ESR:                   11-15 @ 08:19  --    C-Reactive Protein:     11-15 @ 08:19  --    Procalcitonin:           11-15 @ 08:19   <0.05      11-15    144  |  110<H>  |  13  ----------------------------<  130<H>  3.5   |  24  |  1.50<H>    Ca    8.6      15 Nov 2018 08:19  Phos  3.2     11-15  Mg     2.2     11-15    TPro  6.5  /  Alb  2.8<L>  /  TBili  0.9  /  DBili  x   /  AST  26  /  ALT  17  /  AlkPhos  89  11-15                    Wound culture:                11-14 @ 21:51  Organism --  Culture w/ gram stain --  Specimen Source .Urine Catheterized        Abscess culture:             11-14 @ 21:51  Organism --  Gram Stain --  Specimen Source .Urine Catheterized            Tissue culture:           11-14 @ 21:51  Organism --  Gram Stain --  Specimen Source .Urine Catheterized        Body Fluid Smear & Culture:                        11-14 @ 21:51  AFB Smear  --  Culture Acid Fast Body Fluid w/ Smear  --  Culture Acid Fast Smear Concentrated   --    Culture Results:       No growth  Specimen Source .Urine Catheterized Patient is a 82y old  Female who presents with a chief complaint of fall      The patient is a 81yo female with a PMH of dementia, HTN, and depression who was brought to the ED after she had a fall at the [facility]. The pt tripped over someone else's walker and fell down to the floor. In the ED She had a fever of 100.9F. She was given IV Zosyn and Vancomycin. Her chest CT angio did not show PE but there was a pleural based RML infiltrate. Her evaluation also showed DVT of RLE.       PAST MEDICAL & SURGICAL HISTORY:  Anxiety  HTN (hypertension)  Depression  Dementia  No significant past surgical history      Allergies    No Known Allergies    Intolerances    REVIEW OF SYSTEMS:    No cough or SOB  No abdominal pain or diarrhea.       HOME MEDICATIONS:    MEDICATIONS  (STANDING):  docusate sodium 100 milliGRAM(s) Oral two times a day  enoxaparin Injectable 50 milliGRAM(s) SubCutaneous two times a day  mirtazapine 15 milliGRAM(s) Oral at bedtime  pantoprazole    Tablet 40 milliGRAM(s) Oral before breakfast  sertraline 25 milliGRAM(s) Oral daily  sertraline 50 milliGRAM(s) Oral daily    MEDICATIONS  (PRN):  haloperidol    Injectable 1 milliGRAM(s) IntraMuscular every 6 hours PRN Agitation      Vital Signs Last 24 Hrs  T(C): 37.1 (15 Nov 2018 21:54), Max: 37.1 (15 Nov 2018 21:54)  T(F): 98.7 (15 Nov 2018 21:54), Max: 98.7 (15 Nov 2018 21:54)  HR: 102 (15 Nov 2018 21:54) (84 - 102)  BP: 125/68 (15 Nov 2018 21:54) (125/68 - 148/81)  BP(mean): --  RR: 18 (15 Nov 2018 21:54) (17 - 19)  SpO2: 97% (15 Nov 2018 21:54) (96% - 97%)    PHYSICAL EXAM:  HEENT: NC/AT  Neck: Soft  Lungs: Decreased BS bilaterally. no wheezing.   Heart: RRR; no murmurs.  Abdomen: Soft; no masses.  Extremities: No ulcers.   Neurologic: No deficits.       I&O's Summary    14 Nov 2018 07:01  -  15 Nov 2018 07:00  --------------------------------------------------------  IN: 370 mL / OUT: 0 mL / NET: 370 mL    15 Nov 2018 07:01  -  15 Nov 2018 22:12  --------------------------------------------------------  IN: 120 mL / OUT: 0 mL / NET: 120 mL      LABORATORY:                       10.2   8.64  )-----------( 195      ( 15 Nov 2018 08:19 )             30.0       ESR:                   11-15 @ 08:19  --    C-Reactive Protein:     11-15 @ 08:19  --    Procalcitonin:           11-15 @ 08:19   <0.05      11-15    144  |  110<H>  |  13  ----------------------------<  130<H>  3.5   |  24  |  1.50<H>    Ca    8.6      15 Nov 2018 08:19  Phos  3.2     11-15  Mg     2.2     11-15    TPro  6.5  /  Alb  2.8<L>  /  TBili  0.9  /  DBili  x   /  AST  26  /  ALT  17  /  AlkPhos  89  11-15      LABORATORY:    CBC Full  -  ( 15 Nov 2018 08:19 )  WBC Count : 8.64 K/uL  Hemoglobin : 10.2 g/dL  Hematocrit : 30.0 %  Platelet Count - Automated : 195 K/uL  Mean Cell Volume : 100.3 fl  Mean Cell Hemoglobin : 34.1 pg  Mean Cell Hemoglobin Concentration : 34.0 gm/dL  Auto Neutrophil # : x  Auto Lymphocyte # : x  Auto Monocyte # : x  Auto Eosinophil # : x  Auto Basophil # : x  Auto Neutrophil % : x  Auto Lymphocyte % : x  Auto Monocyte % : x  Auto Eosinophil % : x  Auto Basophil % : x      C-Reactive Protein:     11-15 @ 08:19  --    Procalcitonin:           11-15 @ 08:19   <0.05      11-15    144  |  110<H>  |  13  ----------------------------<  130<H>  3.5   |  24  |  1.50<H>    Ca    8.6      15 Nov 2018 08:19  Phos  3.2     11-15  Mg     2.2     11-15    TPro  6.5  /  Alb  2.8<L>  /  TBili  0.9  /  DBili  x   /  AST  26  /  ALT  17  /  AlkPhos  89  11-15      Tissue culture:           11-14 @ 21:51  Organism --  Gram Stain --  Specimen Source .Urine Catheterized        Body Fluid Smear & Culture:                        11-14 @ 21:51  AFB Smear  --  Culture Acid Fast Body Fluid w/ Smear  --  Culture Acid Fast Smear Concentrated   --    Culture Results:       No growth  Specimen Source .Urine Catheterized      Assessment and Plan:    1. RML pleural based density.  2. RLE DVT.    . Low suspicion for pneumonia with no cough or SOB.  . Agree to discontinue all antibiotics.  . Pulmonary evaluation.  . Continue Lovenox.    Thank you for the courtesy of this consultation.

## 2018-11-15 NOTE — PROGRESS NOTE ADULT - SUBJECTIVE AND OBJECTIVE BOX
PROGRESS NOTE  Patient is a 82y old  Female who presents with a chief complaint of s/p fall   dvt (15 Nov 2018 08:51)  Chart and available morning labs /imaging are reviewed electronically , urgent issues addressed . More information  is being added upon completion of rounds , when more information is collected and management discussed with consultants , medical staff and social service/case management on the floor   LATE ENTRY- patient was seen and examined earlier today . Plan of care was discussed with  daughter at bedside , med staff and unit coordinator .   OVERNIGHT  No new issues reported by medical staff . All above noted Patient is resting in a bed comfortably .Confused ,poor mentation .No distress noted   On constant observation due to agitation   HPI: pt is a 81yo female with pmhx of dementia and htn [facility] ems s/p fall. ems reports pt tripped over someone else's walker, pt walks without assistance at baseline. pt found to be febrile on arrival 100.9 Admitted for septic workup and evaluation,send blood and urine cx,serial lactate levels,monitor vitals closley,ivfs hydration,monitor urine output and renal profile,iv abx initiated  pt without complaints. Daughter is at bedside and states patient has a steady gait   PAST MEDICAL & SURGICAL HISTORY:  Anxiety  HTN (hypertension)  Depression  Dementia  No significant past surgical history  MEDICATIONS  (STANDING):  docusate sodium 100 milliGRAM(s) Oral two times a day  enoxaparin Injectable 50 milliGRAM(s) SubCutaneous two times a day  mirtazapine 15 milliGRAM(s) Oral at bedtime  pantoprazole    Tablet 40 milliGRAM(s) Oral before breakfast  sertraline 25 milliGRAM(s) Oral daily  sertraline 50 milliGRAM(s) Oral daily  MEDICATIONS  (PRN):  haloperidol    Injectable 1 milliGRAM(s) IntraMuscular every 6 hours PRN Agitation  OBJECTIVE  T(C): 37 (11-15-18 @ 14:09), Max: 38.3 (18 @ 18:00)  HR: 98 (11-15-18 @ 14:09) (84 - 100)  BP: 148/81 (11-15-18 @ 14:09) (136/82 - 170/74)  RR: 17 (11-15-18 @ 14:09) (16 - 19)  SpO2: 96% (11-15-18 @ 14:09) (96% - 98%)  Wt(kg): --  I&O's Summary  2018 07:01  -  15 Nov 2018 07:00  --------------------------------------------------------  IN: 370 mL / OUT: 0 mL / NET: 370 mL  REVIEW OF SYSTEMS:  ROS is unobtainable due to lethargy and confusion   PHYSICAL EXAM:  Appearance: NAD. VS past 24 hrs -as above   HEENT:   Moist oral mucosa. Conjunctiva clear b/l.  L cheeck swelling and left periorbital hematoma   Neck : supple  Respiratory: Lungs CTAB.  Gastrointestinal:  Soft, nontender. No rebound. No rigidity. BS present	  Cardiovascular: RRR ,S1S2 present  Neurologic: Non-focal. Moving all extremities.  Extremities: No edema. No erythema. No calf tenderness.  Skin: No rashes, No ecchymoses, No cyanosis.	  wounds ,skin lesions-See skin assesment flow sheet   LABS:                      10.2   8.64  )-----------( 195      ( 15 Nov 2018 08:19 )             30.0   11-15  144  |  110<H>  |  13  ----------------------------<  130<H>  3.5   |  24  |  1.50<H>  Ca    8.6      15 Nov 2018 08:19  Phos  3.2     11-15  Mg     2.2     11-15  TPro  6.5  /  Alb  2.8<L>  /  TBili  0.9  /  DBili  x   /  AST  26  /  ALT  17  /  AlkPhos  89  11-15  CAPILLARY BLOOD GLUCOSE  PT/INR - ( 15 Nov 2018 08:19 )   PT: 14.3 sec;   INR: 1.26 ratio     PTT - ( 2018 18:30 )  PTT:27.9 sec  Urinalysis Basic - ( 2018 18:37 )  Color: Yellow / Appearance: Clear / S.010 / pH: x  Gluc: x / Ketone: Negative  / Bili: Negative / Urobili: 1   Blood: x / Protein: 25 mg/dL / Nitrite: Negative   Leuk Esterase: Trace / RBC: x / WBC 0-2   Sq Epi: x / Non Sq Epi: Occasional / Bacteria: Occasional  RADIOLOGY & ADDITIONAL TESTS:< from: Xray Chest 1 View- PORTABLE-Routine (18 @ 19:45) >  EXAM:  XR CHEST PORTABLE ROUTINE 1V                        PROCEDURE DATE:  2018    INTERPRETATION:  Clinical information: Fall, pain  Portable study of the chest, 7:49 PM  No prior chest x-rays present for comparison  Mildly elevated right hemidiaphragm. right hemidiaphragm.  The aorta shows atherosclerotic changes. The lungs are clear. There is no   sign of infiltrate effusion or congestive failure. Heart size is within   normal limits. Hilar regions and mediastinal contours are unremarkable.  The bony thorax appears intact.  IMPRESSION:  No active disease.  < end of copied text >  < from: CT Angio Chest w/ IV Cont (18 @ 19:33) >  EXAM:  CT ANGIO CHEST (W)AW IC                        PROCEDURE DATE:  2018    INTERPRETATION:  Clinical information: Right lower extremity deep vein   thrombosis  Axial images obtained, coronal and sagittal images computer reformatted.   MIP images obtained.  68 cc of Omnipaque 350 intravenously administered, 32 cc discarded.  No central pulmonary embolism. No thoracic aortic aneurysm. No   pericardial effusion. Central airway intact. Thyroid gland not enlarged.   Right thyroid lobe calcification present. Linear scarring and/or   atelectasis at the lung bases. Pleural-based opacity in the right middle   lobe. Advise follow-up study.   No acute osseous abnormalities. No adrenal lesions.  IMPRESSION:  No evidence of pulmonary embolism.   Pleural-based opacity in the right middle lobe. Advise follow-up study.  < end of copied text >   reviewed elctronically  ASSESSMENT/PLAN:

## 2018-11-16 ENCOUNTER — TRANSCRIPTION ENCOUNTER (OUTPATIENT)
Age: 83
End: 2018-11-16

## 2018-11-16 LAB
ANION GAP SERPL CALC-SCNC: 11 MMOL/L — SIGNIFICANT CHANGE UP (ref 5–17)
BUN SERPL-MCNC: 9 MG/DL — SIGNIFICANT CHANGE UP (ref 7–23)
CALCIUM SERPL-MCNC: 8.7 MG/DL — SIGNIFICANT CHANGE UP (ref 8.5–10.1)
CHLORIDE SERPL-SCNC: 111 MMOL/L — HIGH (ref 96–108)
CO2 SERPL-SCNC: 24 MMOL/L — SIGNIFICANT CHANGE UP (ref 22–31)
CREAT SERPL-MCNC: 0.92 MG/DL — SIGNIFICANT CHANGE UP (ref 0.5–1.3)
GLUCOSE SERPL-MCNC: 96 MG/DL — SIGNIFICANT CHANGE UP (ref 70–99)
HCT VFR BLD CALC: 29.5 % — LOW (ref 34.5–45)
HGB BLD-MCNC: 10.1 G/DL — LOW (ref 11.5–15.5)
MCHC RBC-ENTMCNC: 34 PG — SIGNIFICANT CHANGE UP (ref 27–34)
MCHC RBC-ENTMCNC: 34.2 GM/DL — SIGNIFICANT CHANGE UP (ref 32–36)
MCV RBC AUTO: 99.3 FL — SIGNIFICANT CHANGE UP (ref 80–100)
NRBC # BLD: 0 /100 WBCS — SIGNIFICANT CHANGE UP (ref 0–0)
PLATELET # BLD AUTO: 199 K/UL — SIGNIFICANT CHANGE UP (ref 150–400)
POTASSIUM SERPL-MCNC: 3 MMOL/L — LOW (ref 3.5–5.3)
POTASSIUM SERPL-SCNC: 3 MMOL/L — LOW (ref 3.5–5.3)
RBC # BLD: 2.97 M/UL — LOW (ref 3.8–5.2)
RBC # FLD: 13.2 % — SIGNIFICANT CHANGE UP (ref 10.3–14.5)
SODIUM SERPL-SCNC: 146 MMOL/L — HIGH (ref 135–145)
URATE SERPL-MCNC: 4.1 MG/DL — SIGNIFICANT CHANGE UP (ref 2.5–7)
WBC # BLD: 7.09 K/UL — SIGNIFICANT CHANGE UP (ref 3.8–10.5)
WBC # FLD AUTO: 7.09 K/UL — SIGNIFICANT CHANGE UP (ref 3.8–10.5)

## 2018-11-16 RX ORDER — FONDAPARINUX SODIUM 2.5 MG/.5ML
1 INJECTION, SOLUTION SUBCUTANEOUS
Qty: 42 | Refills: 0 | OUTPATIENT
Start: 2018-11-16 | End: 2018-12-06

## 2018-11-16 RX ORDER — SERTRALINE 25 MG/1
1 TABLET, FILM COATED ORAL
Qty: 0 | Refills: 0 | COMMUNITY

## 2018-11-16 RX ORDER — POTASSIUM CHLORIDE 20 MEQ
10 PACKET (EA) ORAL
Qty: 0 | Refills: 0 | Status: COMPLETED | OUTPATIENT
Start: 2018-11-16 | End: 2018-11-16

## 2018-11-16 RX ORDER — POTASSIUM CHLORIDE 20 MEQ
40 PACKET (EA) ORAL ONCE
Qty: 0 | Refills: 0 | Status: COMPLETED | OUTPATIENT
Start: 2018-11-16 | End: 2018-11-16

## 2018-11-16 RX ORDER — PANTOPRAZOLE SODIUM 20 MG/1
1 TABLET, DELAYED RELEASE ORAL
Qty: 30 | Refills: 0 | OUTPATIENT
Start: 2018-11-16 | End: 2018-12-15

## 2018-11-16 RX ORDER — RIVAROXABAN 15 MG-20MG
15 KIT ORAL
Qty: 0 | Refills: 0 | Status: DISCONTINUED | OUTPATIENT
Start: 2018-11-17 | End: 2018-11-26

## 2018-11-16 RX ORDER — DOCUSATE SODIUM 100 MG
200 CAPSULE ORAL
Qty: 0 | Refills: 0 | COMMUNITY

## 2018-11-16 RX ORDER — SERTRALINE 25 MG/1
1 TABLET, FILM COATED ORAL
Qty: 30 | Refills: 0 | OUTPATIENT
Start: 2018-11-16 | End: 2018-12-15

## 2018-11-16 RX ORDER — COLCHICINE 0.6 MG
1 TABLET ORAL
Qty: 0 | Refills: 0 | COMMUNITY

## 2018-11-16 RX ORDER — SENNA PLUS 8.6 MG/1
1 TABLET ORAL
Qty: 0 | Refills: 0 | COMMUNITY

## 2018-11-16 RX ADMIN — SERTRALINE 25 MILLIGRAM(S): 25 TABLET, FILM COATED ORAL at 11:18

## 2018-11-16 RX ADMIN — Medication 100 MILLIEQUIVALENT(S): at 20:04

## 2018-11-16 RX ADMIN — Medication 100 MILLIGRAM(S): at 18:19

## 2018-11-16 RX ADMIN — HALOPERIDOL DECANOATE 1 MILLIGRAM(S): 100 INJECTION INTRAMUSCULAR at 01:37

## 2018-11-16 RX ADMIN — Medication 100 MILLIEQUIVALENT(S): at 22:16

## 2018-11-16 RX ADMIN — MIRTAZAPINE 15 MILLIGRAM(S): 45 TABLET, ORALLY DISINTEGRATING ORAL at 22:16

## 2018-11-16 RX ADMIN — ENOXAPARIN SODIUM 50 MILLIGRAM(S): 100 INJECTION SUBCUTANEOUS at 18:18

## 2018-11-16 RX ADMIN — ENOXAPARIN SODIUM 50 MILLIGRAM(S): 100 INJECTION SUBCUTANEOUS at 05:28

## 2018-11-16 RX ADMIN — Medication 100 MILLIEQUIVALENT(S): at 19:38

## 2018-11-16 RX ADMIN — SERTRALINE 50 MILLIGRAM(S): 25 TABLET, FILM COATED ORAL at 11:18

## 2018-11-16 NOTE — DISCHARGE NOTE ADULT - PATIENT PORTAL LINK FT
You can access the [email or web address] Portal, offered by [facility], by registering with the following website: [email or web address]

## 2018-11-16 NOTE — PROGRESS NOTE ADULT - SUBJECTIVE AND OBJECTIVE BOX
On one to One watch Chart reviewed Patient was examined Continue management as per previous pulmonary note recommendations Detailed note will be entered later today in the space [facility] once more data is gathered On one to One watch Chart reviewed Patient was examined Continue management as per previous pulmonary note recommendations Detailed note will be entered later today in the space [facility] once more data is gathered        Adriana Swanson [facility] P   ALLERGY nka  CONTACT Dtr Tameka Campos  Self     VITALS/LABS                           11/16/2018 afeb 105 150/80 18 97%   11/16/2018 W 7 Hb 10 Plt 199 Na 146 K 3 CO2 24 Cr .9     REVIEW OF SYMPTOMS    Able to give ROS  NO     PHYSICAL EXAM    HEENT Unremarkable PERRLA atraumatic   RESP Fair air entry EXP prolonged    Harsh breath sound Resp distres mild   CARDIAC S1 S2 No S3     NO JVD    ABDOMEN SOFT BS PRESENT NOT DISTENDED No hepatosplenomegaly PEDAL EDEMA present No calf tenderness  NO rash   GENERAL Not TOXIC looking    GLOBAL ISSUE/BEST PRACTICE:      PROBLEM: HOB elevation:   y            PROBLEM: Stress ulcer proph:    protonix 40 911/14)                       PROBLEM: VTE prophylaxis:      lvnx 50.2 (11/14)   PROBLEM: Glycemic control:    na  PROBLEM: Nutrition:    watkins (11/14)   PROBLEM: Advanced directive: na     PROBLEM: Allergies:  na  EVENT AGITATION 11/14/2018 Constant observation     PATIENT MANAGEMENT   11/14/2018 ADMISSION [facility] P DR LEVY TY   11/14/2018 83yo female with pmhx of dementia and htn [facility] ems s/p fall. ems reports pt tripped over someone else's walker, pt walks without assistance at baseline. pt found to be febrile on arrival 100.9. pt without complaints.   pmd: irving    Pt was found to have DVT and was started on full dose lovenox 11/14 She was also found to have patchy pl based opacity rml     ASSESSMENT/RECOMMENDATIONS  11/14/2018 ADMISSION [facility] P DR LEVY TY    DVT   11/15/2018 Patient on lovenox Will have to consider changing to Xarelto 15 bid if Creatinine not increasing   Also need to consider if she is high fall risk then we may have to do IVCF   11/16/2018 DW Dr Ty and with family Pt is steady on her feet and this was a trip fall Pros and cons of doac explained to pt family Xarelto agreed upon by Dr Ty     ELEVATED CREAT   Monitor serially     PATCHY OPACITY RML ON CT CHEST DONE 11/14  This could be a pulmonary infarct Will dw IR to see if ctnb is feasible Will need serial followup and will need PET after DC   11/16/2018 I dw Dr Farah He feels that instead of ctnb we should rep[eat CT Chest in 2 m Message sent to Dr Ty     PNEUMONIA   Bacterial pneumonia unlikely in absence of procalcitonin leukocytosis and fever Will DC abio (11/15/2018)    TIME SPENT Over 25 minutes aggregate care time spent on encounter; activities included   direct patient care, counseling and/or coordinating care reviewing notes, lab data/ imaging , discussion with multidisciplinary team/ patient  /family. Risks, benefits, alternatives  discussed in detail.

## 2018-11-16 NOTE — DISCHARGE NOTE ADULT - PLAN OF CARE
resolution of right leg pain and swelling xarelto 15 mg po bid x 21 day and then 20 mg po qd ,followup with hematologist AZAR Araiza regarding duration of ac warm compresses to left cheek hematoma ,tylenol prn RESUME continue home medications ,followup with PCP continue current managmnet and medications BP monitoring,continue current antihypertensive meds, low salt diet,followup with PMD continue home medications

## 2018-11-16 NOTE — DISCHARGE NOTE ADULT - CARE PLAN
Principal Discharge DX:	DVT (deep venous thrombosis)  Secondary Diagnosis:	Fall  Secondary Diagnosis:	Anxiety  Secondary Diagnosis:	Depression  Secondary Diagnosis:	HTN (hypertension)  Secondary Diagnosis:	Dementia  Secondary Diagnosis:	Prophylactic measure Principal Discharge DX:	DVT (deep venous thrombosis)  Goal:	resolution of right leg pain and swelling  Assessment and plan of treatment:	xarelto 15 mg po bid x 21 day and then 20 mg po qd ,followup with hematologist AZAR Araiza regarding duration of ac  Secondary Diagnosis:	Fall  Assessment and plan of treatment:	warm compresses to left cheek hematoma ,tylenol prn  Secondary Diagnosis:	Anxiety  Assessment and plan of treatment:	RESUME continue home medications ,followup with PCP  Secondary Diagnosis:	Depression  Assessment and plan of treatment:	continue current managmnet and medications  Secondary Diagnosis:	HTN (hypertension)  Assessment and plan of treatment:	BP monitoring,continue current antihypertensive meds, low salt diet,followup with PMD  Secondary Diagnosis:	Dementia  Assessment and plan of treatment:	continue home medications  Secondary Diagnosis:	Prophylactic measure  Assessment and plan of treatment:	continue current managmnet and medications

## 2018-11-16 NOTE — PROGRESS NOTE ADULT - SUBJECTIVE AND OBJECTIVE BOX
Patient is a 82y Female with past medical history of   increase of BUN/creatinine        presenting with   possible acute kidney injury     who reports no complaints overnight.    REVIEW OF SYSTEMS:    unable to obtain review of system  Allergies    No Known Allergies    Intolerances        MEDICATIONS  (STANDING):  docusate sodium 100 milliGRAM(s) Oral two times a day  enoxaparin Injectable 50 milliGRAM(s) SubCutaneous two times a day  mirtazapine 15 milliGRAM(s) Oral at bedtime  pantoprazole    Tablet 40 milliGRAM(s) Oral before breakfast  sertraline 25 milliGRAM(s) Oral daily  sertraline 50 milliGRAM(s) Oral daily                            10.1   7.09  )-----------( 199      ( 2018 08:37 )             29.5       CBC Full  -  ( 2018 08:37 )  WBC Count : 7.09 K/uL  Hemoglobin : 10.1 g/dL  Hematocrit : 29.5 %  Platelet Count - Automated : 199 K/uL  Mean Cell Volume : 99.3 fl  Mean Cell Hemoglobin : 34.0 pg  Mean Cell Hemoglobin Concentration : 34.2 gm/dL  Auto Neutrophil # : x  Auto Lymphocyte # : x  Auto Monocyte # : x  Auto Eosinophil # : x  Auto Basophil # : x  Auto Neutrophil % : x  Auto Lymphocyte % : x  Auto Monocyte % : x  Auto Eosinophil % : x  Auto Basophil % : x      11-16    146<H>  |  111<H>  |  9   ----------------------------<  96  3.0<L>   |  24  |  0.92    Ca    8.7      2018 08:37  Phos  3.2     11-15  Mg     2.2     11-15    TPro  6.5  /  Alb  2.8<L>  /  TBili  0.9  /  DBili  x   /  AST  26  /  ALT  17  /  AlkPhos  89  11-15      CAPILLARY BLOOD GLUCOSE          Vital Signs Last 24 Hrs  T(C): 36.9 (2018 04:40), Max: 37.1 (15 Nov 2018 21:54)  T(F): 98.4 (2018 04:40), Max: 98.7 (15 Nov 2018 21:54)  HR: 98 (2018 04:40) (98 - 102)  BP: 151/77 (2018 04:40) (125/68 - 151/77)  BP(mean): --  RR: 18 (2018 04:40) (17 - 18)  SpO2: 96% (2018 04:40) (96% - 97%)    Urinalysis Basic - ( 2018 18:37 )    Color: Yellow / Appearance: Clear / S.010 / pH: x  Gluc: x / Ketone: Negative  / Bili: Negative / Urobili: 1   Blood: x / Protein: 25 mg/dL / Nitrite: Negative   Leuk Esterase: Trace / RBC: x / WBC 0-2   Sq Epi: x / Non Sq Epi: Occasional / Bacteria: Occasional        PT/INR - ( 15 Nov 2018 08:19 )   PT: 14.3 sec;   INR: 1.26 ratio         PTT - ( 2018 18:30 )  PTT:27.9 sec        PHYSICAL EXAM:    Constitutional: NAD,   HEENT:  conjunctiva clear   Neck: No JVD  Respiratory: CTAB  Cardiovascular: S1 and S2  Gastrointestinal: BS+, soft, NT/ND  Extremities: No peripheral edema  Neurological: unable to obtain  Psychiatric: unable to obtain  : No Guzman  Skin: dry  Dialysis Access: Not applicable

## 2018-11-16 NOTE — DISCHARGE NOTE ADULT - HOSPITAL COURSE
HPI: pt is a 83yo female with pmhx of [facility] ems reports pt tripped over someone else's walker, pt walks without assistance at baseline. pt found to be febrile on arrival 100.9 Admitted for septic workup and evaluation,send blood and urine cx,serial lactate levels,monitor vitals closley,ivfs hydration,monitor urine output and renal profile,iv abx initiated  pt without complaints. Daughter is at bedside and states patient has a steady gait HPI: pt is a 83yo female with pmhx of dementia and htn [facility] ems s/p fall. ems reports pt tripped over someone else's walker, pt walks without assistance at baseline. pt found to be febrile on arrival 100.9 Admitted for septic workup and evaluation,send blood and urine cx,serial lactate levels,monitor vitals closley,ivfs hydration,monitor urine output and renal profile,iv abx initiated  pt without complaints. Daughter is at bedside and states patient has a steady gait 11/14/2018 83yo female with pmhx of dementia and htn [facility] ems s/p fall. ems reports pt tripped over someone else's walker, pt walks without assistance at baseline. pt found to be febrile on arrival 100.9. pt without complaints.   Pt was found to have DVT and was started on full dose lovenox 11/14 She was also found to have patchy pl based opacity rml   DVT   11/15/2018 Patient on lovenox Will have to consider changing to Xarelto 15 bid if Creatinine not increasing   Also need to consider if she is high fall risk then we may have to do IVCF   11/16/2018 CELIO Ty and with family Pt is steady on her feet and this was a trip fall Pros and cons of doac explained to pt family    ELEVATED CREAT   Monitor serially   PATCHY OPACITY RML ON CT CHEST DONE 11/14  This could be a pulmonary infarct Will celio IR to see if ctnb is feasible Will need serial followup and will need PET after DC   11/16/2018 I celio Farah He feels that instead of ctnb we should repeat CT Chest in 2 m   PNEUMONIA ruled out  Bacterial pneumonia unlikely in absence of procalcitonin leukocytosis and fever Will DC abio (11/15/2018) HPI: pt is a 83yo female with pmhx of dementia and htn [facility] ems s/p fall. ems reports pt tripped over someone else's walker, pt walks without assistance at baseline. pt found to be febrile on arrival 100.9 Admitted for septic workup and evaluation,send blood and urine cx,serial lactate levels,monitor vitals closley,ivfs hydration,monitor urine output and renal profile,iv abx initiated  pt without complaints. Daughter is at bedside and states patient has a steady gait 11/14/2018 83yo female with pmhx of dementia and htn [facility] ems s/p fall. ems reports pt tripped over someone else's walker, pt walks without assistance at baseline. pt found to be febrile on arrival 100.9. pt without complaints.   Pt was found to have DVT and was started on full dose lovenox 11/14 She was also found to have patchy pl based opacity rml   DVT   11/15/2018 Patient on lovenox Will have to consider changing to Xarelto 15 bid if Creatinine not increasing   Also need to consider if she is high fall risk then we may have to do IVCF   11/16/2018 CELIO Ty and with family Pt is steady on her feet and this was a trip fall Pros and cons of doac explained to pt family    ELEVATED CREAT with mild hydronephrosis and urinary retention ,WOO CATH PLACED ,SEEN BY  CONS -VOID TRIAL IN 1 WEEK IS RECOMMENDED .RENAL US TO EVALUATE FOR RESOLUTION OF MILD HYDRONEPHROSIS   Monitor serially   PATCHY OPACITY RML ON CT CHEST DONE 11/14  This could be a pulmonary infarct Will celio IR to see if ctnb is feasible Will need serial followup and will need PET after DC   11/16/2018 I celio Farah He feels that instead of ctnb we should repeat CT Chest in 2 m   PNEUMONIA ruled out  Bacterial pneumonia unlikely in absence of procalcitonin leukocytosis and fever Will DC abio (11/15/2018)

## 2018-11-16 NOTE — PROGRESS NOTE ADULT - SUBJECTIVE AND OBJECTIVE BOX
Interval History:    CENTRAL LINE:   [  ] YES       [  ] NO  WOO:                 [  ] YES       [  ] NO         REVIEW OF SYSTEMS:  All Systems below were reviewed and are negative [  ]  HEENT:  ID:  Pulmonary:  Cardiac:  GI:  Renal:  Musculoskeletal:  All other systems above were reviewed and are negative   [  ]      MEDICATIONS  (STANDING):  docusate sodium 100 milliGRAM(s) Oral two times a day  mirtazapine 15 milliGRAM(s) Oral at bedtime  pantoprazole    Tablet 40 milliGRAM(s) Oral before breakfast  potassium chloride  10 mEq/100 mL IVPB 10 milliEquivalent(s) IV Intermittent every 1 hour  sertraline 25 milliGRAM(s) Oral daily  sertraline 50 milliGRAM(s) Oral daily    MEDICATIONS  (PRN):  haloperidol    Injectable 1 milliGRAM(s) IntraMuscular every 6 hours PRN Agitation      Vital Signs Last 24 Hrs  T(C): 36.6 (16 Nov 2018 13:49), Max: 37.1 (15 Nov 2018 21:54)  T(F): 97.9 (16 Nov 2018 13:49), Max: 98.7 (15 Nov 2018 21:54)  HR: 105 (16 Nov 2018 13:49) (98 - 105)  BP: 151/81 (16 Nov 2018 13:49) (125/68 - 151/81)  BP(mean): --  RR: 18 (16 Nov 2018 13:49) (18 - 18)  SpO2: 97% (16 Nov 2018 13:49) (96% - 97%)    I&O's Summary    15 Nov 2018 07:01  -  16 Nov 2018 07:00  --------------------------------------------------------  IN: 120 mL / OUT: 0 mL / NET: 120 mL        PHYSICAL EXAM:  HEENT: NC/AT; PERRLA  Neck: Soft; no tenderness  Lungs: CTA bilaterally; no wheezing.   Heart:  Abdomen:  Genital/ Rectal:  Extremities:  Neurologic:  Vascular:      LABORATORY:    CBC Full  -  ( 16 Nov 2018 08:37 )  WBC Count : 7.09 K/uL  Hemoglobin : 10.1 g/dL  Hematocrit : 29.5 %  Platelet Count - Automated : 199 K/uL  Mean Cell Volume : 99.3 fl  Mean Cell Hemoglobin : 34.0 pg  Mean Cell Hemoglobin Concentration : 34.2 gm/dL  Auto Neutrophil # : x  Auto Lymphocyte # : x  Auto Monocyte # : x  Auto Eosinophil # : x  Auto Basophil # : x  Auto Neutrophil % : x  Auto Lymphocyte % : x  Auto Monocyte % : x  Auto Eosinophil % : x  Auto Basophil % : x      ESR:                   11-15 @ 08:19  --    C-Reactive Protein:     11-15 @ 08:19  --    Procalcitonin:           11-15 @ 08:19   <0.05      11-16    146<H>  |  111<H>  |  9   ----------------------------<  96  3.0<L>   |  24  |  0.92    Ca    8.7      16 Nov 2018 08:37  Phos  3.2     11-15  Mg     2.2     11-15    TPro  6.5  /  Alb  2.8<L>  /  TBili  0.9  /  DBili  x   /  AST  26  /  ALT  17  /  AlkPhos  89  11-15          Assessment and Plan:          Alfonso Ashford MD   [phone]. She is afebrile. Sleeping in bed  No coughing noted.    MEDICATIONS  (STANDING):  docusate sodium 100 milliGRAM(s) Oral two times a day  mirtazapine 15 milliGRAM(s) Oral at bedtime  pantoprazole    Tablet 40 milliGRAM(s) Oral before breakfast  potassium chloride  10 mEq/100 mL IVPB 10 milliEquivalent(s) IV Intermittent every 1 hour  sertraline 25 milliGRAM(s) Oral daily  sertraline 50 milliGRAM(s) Oral daily    MEDICATIONS  (PRN):  haloperidol    Injectable 1 milliGRAM(s) IntraMuscular every 6 hours PRN Agitation      Vital Signs Last 24 Hrs  T(C): 36.6 (16 Nov 2018 13:49), Max: 37.1 (15 Nov 2018 21:54)  T(F): 97.9 (16 Nov 2018 13:49), Max: 98.7 (15 Nov 2018 21:54)  HR: 105 (16 Nov 2018 13:49) (98 - 105)  BP: 151/81 (16 Nov 2018 13:49) (125/68 - 151/81)  BP(mean): --  RR: 18 (16 Nov 2018 13:49) (18 - 18)  SpO2: 97% (16 Nov 2018 13:49) (96% - 97%)    I&O's Summary    15 Nov 2018 07:01  -  16 Nov 2018 07:00  --------------------------------------------------------  IN: 120 mL / OUT: 0 mL / NET: 120 mL      PHYSICAL EXAM:  HEENT: NC/AT; PERRLA  Neck: Soft; no tenderness  Lungs: Coarse BS bilaterally.  Heart: RRR; no murmurs.  Abdomen: Soft; no masses.  Extremities: No ulcers.       LABORATORY:    CBC Full  -  ( 16 Nov 2018 08:37 )  WBC Count : 7.09 K/uL  Hemoglobin : 10.1 g/dL  Hematocrit : 29.5 %  Platelet Count - Automated : 199 K/uL  Mean Cell Volume : 99.3 fl  Mean Cell Hemoglobin : 34.0 pg  Mean Cell Hemoglobin Concentration : 34.2 gm/dL  Auto Neutrophil # : x  Auto Lymphocyte # : x  Auto Monocyte # : x  Auto Eosinophil # : x  Auto Basophil # : x  Auto Neutrophil % : x  Auto Lymphocyte % : x  Auto Monocyte % : x  Auto Eosinophil % : x  Auto Basophil % : x      ESR:                   11-15 @ 08:19  --    C-Reactive Protein:     11-15 @ 08:19  --    Procalcitonin:           11-15 @ 08:19   <0.05      11-16    146<H>  |  111<H>  |  9   ----------------------------<  96  3.0<L>   |  24  |  0.92    Ca    8.7      16 Nov 2018 08:37  Phos  3.2     11-15  Mg     2.2     11-15    TPro  6.5  /  Alb  2.8<L>  /  TBili  0.9  /  DBili  x   /  AST  26  /  ALT  17  /  AlkPhos  89  11-15      Assessment and Plan:    1. RML pleural based density.  2. RLE DVT.    . Low suspicion for pneumonia with no cough or SOB.  . Monitor the patient off antibiotics.  . Pulmonary evaluation.  . Continue Lovenox for DVT.         Alfonso Ashford MD   [phone].

## 2018-11-16 NOTE — PROGRESS NOTE ADULT - SUBJECTIVE AND OBJECTIVE BOX
Patient is a 82y Female with past medical history of   increase of BUN/creatinine        presenting with   possible acute kidney injury     who reports no complaints overnight.  Chart and available morning labs /imaging are reviewed electronically , urgent issues addressed . More information  is being added upon completion of rounds , when more information is collected and management discussed with consultants , medical staff and social service/case management on the floor LATE ENTRY- patient was seen and examined earlier today . Plan of care was discussed with med staff and unit coordinator   REVIEW OF SYSTEMS:  10 systems are reviewed and are negative   unable to obtain review of system  Allergies  No Known Allergies  Intolerances  MEDICATIONS  (STANDING):  docusate sodium 100 milliGRAM(s) Oral two times a day  enoxaparin Injectable 50 milliGRAM(s) SubCutaneous two times a day  mirtazapine 15 milliGRAM(s) Oral at bedtime  pantoprazole    Tablet 40 milliGRAM(s) Oral before breakfast  sertraline 25 milliGRAM(s) Oral daily  sertraline 50 milliGRAM(s) Oral daily                      10.1   7.09  )-----------( 199      ( 2018 08:37 )             29.5   CBC Full  -  ( 2018 08:37 )  WBC Count : 7.09 K/uL  Hemoglobin : 10.1 g/dL  Hematocrit : 29.5 %  Platelet Count - Automated : 199 K/uL  Mean Cell Volume : 99.3 fl  Mean Cell Hemoglobin : 34.0 pg  Mean Cell Hemoglobin Concentration : 34.2 gm/dL  Auto Neutrophil # : x  Auto Lymphocyte # : x  Auto Monocyte # : x  Auto Eosinophil # : x  Auto Basophil # : x  Auto Neutrophil % : x  Auto Lymphocyte % : x  Auto Monocyte % : x  Auto Eosinophil % : x  Auto Basophil % : x  11-16    146<H>  |  111<H>  |  9   ----------------------------<  96  3.0<L>   |  24  |  0.92    Ca    8.7      2018 08:37  Phos  3.2     11-15  Mg     2.2     11-15    TPro  6.5  /  Alb  2.8<L>  /  TBili  0.9  /  DBili  x   /  AST  26  /  ALT  17  /  AlkPhos  89  11-15  CAPILLARY BLOOD GLUCOSE  Vital Signs Last 24 Hrs  T(C): 36.9 (2018 04:40), Max: 37.1 (15 Nov 2018 21:54)  T(F): 98.4 (2018 04:40), Max: 98.7 (15 Nov 2018 21:54)  HR: 98 (2018 04:40) (98 - 102)  BP: 151/77 (2018 04:40) (125/68 - 151/77)  BP(mean): --  RR: 18 (2018 04:40) (17 - 18)  SpO2: 96% (2018 04:40) (96% - 97%)  Urinalysis Basic - ( 2018 18:37 )  Color: Yellow / Appearance: Clear / S.010 / pH: x  Gluc: x / Ketone: Negative  / Bili: Negative / Urobili: 1   Blood: x / Protein: 25 mg/dL / Nitrite: Negative   Leuk Esterase: Trace / RBC: x / WBC 0-2   Sq Epi: x / Non Sq Epi: Occasional / Bacteria: Occasional  PT/INR - ( 15 Nov 2018 08:19 )   PT: 14.3 sec;   INR: 1.26 ratio    PTT - ( 2018 18:30 )  PTT:27.9 sec  PHYSICAL EXAM:  Constitutional: NAD,   HEENT:  conjunctiva clear   Neck: No JVD  Respiratory: CTAB  Cardiovascular: S1 and S2  Gastrointestinal: BS+, soft, NT/ND  Extremities: No peripheral edema  Neurological: unable to obtain  Psychiatric: unable to obtain  : No Guzman  Skin: dry  Dialysis Access: Not applicable  < from: CT Angio Chest w/ IV Cont (18 @ 19:33) >  EXAM:  CT ANGIO CHEST (W)AW IC                            PROCEDURE DATE:  2018          INTERPRETATION:  Clinical information: Right lower extremity deep vein   thrombosis    Axial images obtained, coronal and sagittal images computer reformatted.   MIP images obtained.    68 cc of Omnipaque 350 intravenously administered, 32 cc discarded.    No central pulmonary embolism. No thoracic aortic aneurysm. No   pericardial effusion. Central airway intact. Thyroid gland not enlarged.   Right thyroid lobe calcification present. Linear scarring and/or   atelectasis at the lung bases. Pleural-based opacity in the right middle   lobe. Advise follow-up study.     No acute osseous abnormalities. No adrenal lesions.    IMPRESSION:    No evidence of pulmonary embolism.       Pleural-based opacity in the right middle lobe. Advise follow-up study.    < end of copied text >CTT IN 2 MNTS IS RECOMMENDED ,FAMILY IS AWARE

## 2018-11-16 NOTE — PROVIDER CONTACT NOTE (OTHER) - SITUATION
MD called to confirm they did not want to supplement K. MD made aware earlier that patient refuse PO K tabs.

## 2018-11-16 NOTE — PHYSICAL THERAPY INITIAL EVALUATION ADULT - PERTINENT HX OF CURRENT PROBLEM, REHAB EVAL
Pt admitted due to right leg swelling Patient found with becerril, IV/PCA, hemovac, PAS, heplock, supplemental O2, tel box, abductor pillow, femoral block, epidural, bandaging. to have a + DVT right fem-pop. Pt resides at [facility]

## 2018-11-16 NOTE — PROGRESS NOTE ADULT - SUBJECTIVE AND OBJECTIVE BOX
Patient is a 82y Female with a known history of :  ANNY (acute kidney injury) (N17.9)  Prophylactic measure (Z29.9)  Anxiety (F41.9)  Dementia (F03.90)  Depression (F32.9)  HTN (hypertension) (I10)  PNA (pneumonia) (J18.9)  DVT (deep venous thrombosis) (I82.409)  Fall (W19.XXXA)  Suspected deep vein thrombosis ([id number])    HPI:  pt is a 81yo female with pmhx of dementia and htn [facility] ems s/p fall. ems reports pt tripped over someone else's walker, pt walks without assistance at baseline. pt found to be febrile on arrival 100.9 Admitted for septic workup and evaluation,send blood and urine cx,serial lactate levels,monitor vitals closley,ivfs hydration,monitor urine output and renal profile,iv abx initiated  pt without complaints. Daughter is at bedside and states patient has a steady gait Found to have DVT and started on full dose AC (14 Nov 2018 21:23)      REVIEW OF SYSTEMS:    CONSTITUTIONAL: No fever, weight loss, or fatigue  EYES: No eye pain, visual disturbances, or discharge  ENMT:  No difficulty hearing, tinnitus, vertigo; No sinus or throat pain  NECK: No pain or stiffness  BREASTS: No pain, masses, or nipple discharge  RESPIRATORY: No cough, wheezing, chills or hemoptysis; No shortness of breath  CARDIOVASCULAR: No chest pain, palpitations, dizziness, or leg swelling  GASTROINTESTINAL: No abdominal or epigastric pain. No nausea, vomiting, or hematemesis; No diarrhea or constipation. No melena or hematochezia.  GENITOURINARY: No dysuria, frequency, hematuria, or incontinence  NEUROLOGICAL: No headaches, memory loss, loss of strength, numbness, or tremors  SKIN: No itching, burning, rashes, or lesions   LYMPH NODES: No enlarged glands  ENDOCRINE: No heat or cold intolerance; No hair loss  MUSCULOSKELETAL: No joint pain or swelling; No muscle, back, or extremity pain  PSYCHIATRIC: No depression, anxiety, mood swings, or difficulty sleeping  HEME/LYMPH: No easy bruising, or bleeding gums  ALLERGY AND IMMUNOLOGIC: No hives or eczema    MEDICATIONS  (STANDING):  docusate sodium 100 milliGRAM(s) Oral two times a day  enoxaparin Injectable 50 milliGRAM(s) SubCutaneous two times a day  mirtazapine 15 milliGRAM(s) Oral at bedtime  pantoprazole    Tablet 40 milliGRAM(s) Oral before breakfast  sertraline 25 milliGRAM(s) Oral daily  sertraline 50 milliGRAM(s) Oral daily    MEDICATIONS  (PRN):  haloperidol    Injectable 1 milliGRAM(s) IntraMuscular every 6 hours PRN Agitation      ALLERGIES: No Known Allergies      FAMILY HISTORY:  No pertinent family history in first degree relatives      PHYSICAL EXAMINATION:  -----------------------------  T(C): 36.9 (11-16-18 @ 04:40), Max: 37.1 (11-15-18 @ 21:54)  HR: 98 (11-16-18 @ 04:40) (98 - 102)  BP: 151/77 (11-16-18 @ 04:40) (125/68 - 151/77)  RR: 18 (11-16-18 @ 04:40) (17 - 18)  SpO2: 96% (11-16-18 @ 04:40) (96% - 97%)  Wt(kg): --    11-15 @ 07:01  -  11-16 @ 07:00  --------------------------------------------------------  IN:    Oral Fluid: 120 mL  Total IN: 120 mL    OUT:  Total OUT: 0 mL    Total NET: 120 mL            Constitutional: well developed, normal appearance, well groomed, well nourished, no deformities and no acute distress.   Eyes: the conjunctiva exhibited no abnormalities and the eyelids demonstrated no xanthelasmas.   HEENT: normal oral mucosa, no oral pallor and no oral cyanosis.   Neck: normal jugular venous A waves present, normal jugular venous V waves present and no jugular venous lao A waves.   Pulmonary: no respiratory distress, normal respiratory rhythm and effort, no accessory muscle use and lungs were clear to auscultation bilaterally.   Cardiovascular: heart rate and rhythm were normal, normal S1 and S2 and no murmur, gallop, rub, heave or thrill are present.   Abdomen: soft, non-tender, no hepato-splenomegaly and no abdominal mass palpated.   Musculoskeletal: the gait could not be assessed..   Extremities: no clubbing of the fingernails, no localized cyanosis, no petechial hemorrhages and no ischemic changes.   Skin: normal skin color and pigmentation, no rash, no venous stasis, no skin lesions, no skin ulcer and no xanthoma was observed.   Psychiatric: oriented to person, place, and time, the affect was normal, the mood was normal and not feeling anxious.     LABS:   --------  11-15    144  |  110<H>  |  13  ----------------------------<  130<H>  3.5   |  24  |  1.50<H>    Ca    8.6      15 Nov 2018 08:19  Phos  3.2     11-15  Mg     2.2     11-15    TPro  6.5  /  Alb  2.8<L>  /  TBili  0.9  /  DBili  x   /  AST  26  /  ALT  17  /  AlkPhos  89  11-15                         10.2   8.64  )-----------( 195      ( 15 Nov 2018 08:19 )             30.0     PT/INR - ( 15 Nov 2018 08:19 )   PT: 14.3 sec;   INR: 1.26 ratio         PTT - ( 14 Nov 2018 18:30 )  PTT:27.9 sec    11-15 @ 08:19 CPK total:--, CKMB --, Troponin I - .035 ng/mL    161 mg/dL, 106 mg/dL, 39 mg/dL<L>, 79 mg/dL    Culture Results:   No growth to date. (11-14 @ 22:10)  Culture Results:   No growth to date. (11-14 @ 22:10)  Culture Results:   No growth (11-14 @ 21:51)      RADIOLOGY:  -----------------        ECG:

## 2018-11-16 NOTE — SWALLOW BEDSIDE ASSESSMENT ADULT - COMMENTS
Pt alert, very confused. Pt admitted with fall, DVT, pna. Pt presents with oropharyngeal dysphagia with reduced oral grading, incomplete mastication with expectoration of solids, latent AP transport, timely swallow, no overt s/s of aspiration. Recommend dysphagia 2 mechanical soft consistencies with thin liquids. Discussed with RN, MD.

## 2018-11-16 NOTE — SWALLOW BEDSIDE ASSESSMENT ADULT - ASR SWALLOW ASPIRATION MONITOR
fever/gurgly voice/change of breathing pattern/cough/pneumonia/throat clearing/upper respiratory infection

## 2018-11-16 NOTE — DISCHARGE NOTE ADULT - MEDICATION SUMMARY - MEDICATIONS TO TAKE
I will START or STAY ON the medications listed below when I get home from the hospital:    Xarelto 15 mg oral tablet  -- 1 tab(s) by mouth 2 times a day   -- Check with your doctor before becoming pregnant.  It is very important that you take or use this exactly as directed.  Do not skip doses or discontinue unless directed by your doctor.  Obtain medical advice before taking any non-prescription drugs as some may affect the action of this medication.  Take with food.    -- Indication: For DVT (deep venous thrombosis)    sertraline 25 mg oral tablet  -- 1 tab(s) by mouth once a day  -- Indication: For Depression    mirtazapine 15 mg oral tablet  -- 1 tab(s) by mouth once a day (at bedtime)  -- Indication: For Depression    sertraline 50 mg oral tablet  -- 1 tab(s) by mouth once a day  -- Indication: For Depression    Senna 8.6 mg oral tablet  -- 2 tab(s) by mouth once a day (at bedtime)  -- Indication: For constipation    pantoprazole 40 mg oral delayed release tablet  -- 1 tab(s) by mouth once a day (before a meal)  -- Indication: For gerd I will START or STAY ON the medications listed below when I get home from the hospital:    acetaminophen 325 mg oral tablet  -- 2 tab(s) by mouth every 6 hours, As needed, Temp greater or equal to 38C (100.4F), Mild Pain (1 - 3)  -- Indication: For CoMFORT CARE     morphine 20 mg/mL oral concentrate  -- 2.5 milligram(s) by mouth 4 times a day, As Needed  -- Indication: For PAIN OR DYSPNEA    enoxaparin 40 mg/0.4 mL injectable solution  -- 50 milligram(s) subcutaneous once a day  -- Indication: For DVT (deep venous thrombosis)    LORazepam 2 mg/mL oral concentrate  -- 0.5 milligram(s) by mouth 3 times a day, As Needed  -- Indication: For Anxiety    mirtazapine 15 mg oral tablet  -- 1 tab(s) by mouth once a day (at bedtime)  -- Indication: For Depression    sertraline 50 mg oral tablet  -- 1 tab(s) by mouth once a day  -- Indication: For Depression    sertraline 25 mg oral tablet  -- 1 tab(s) by mouth once a day  -- Indication: For Depression    bisacodyl 10 mg rectal suppository  -- 1 suppository(ies) rectally once a day, As needed, Constipation  -- Indication: For Constipation     polyethylene glycol 3350 oral powder for reconstitution  -- 17 gram(s) by mouth once a day  -- Indication: For Constipation

## 2018-11-16 NOTE — DISCHARGE NOTE ADULT - ADDITIONAL INSTRUCTIONS
CT Angio Chest w/ IV Cont (11.14.18 @ 19:33) >  EXAM:  CT ANGIO CHEST (W)AW IC                        PROCEDURE DATE:  11/14/2018    INTERPRETATION:  Clinical information: Right lower extremity deep vein   thrombosis  Axial images obtained, coronal and sagittal images computer reformatted.   MIP images obtained.  68 cc of Omnipaque 350 intravenously administered, 32 cc discarded.  No central pulmonary embolism. No thoracic aortic aneurysm. No   pericardial effusion. Central airway intact. Thyroid gland not enlarged.   Right thyroid lobe calcification present. Linear scarring and/or   atelectasis at the lung bases. Pleural-based opacity in the right middle   lobe. Advise follow-up study.   No acute osseous abnormalities. No adrenal lesions.  IMPRESSION:  No evidence of pulmonary embolism.   Pleural-based opacity in the right middle lobe. Advise follow-up study.  < end of copied text >  CT OF THE CHEST  IN 2 MONTHS AND FOLLOWUP WITH PULM DR. BAUGH are recommended

## 2018-11-16 NOTE — SWALLOW BEDSIDE ASSESSMENT ADULT - ORAL PHASE
Decreased anterior-posterior movement of the bolus expectoration of solids/Decreased anterior-posterior movement of the bolus

## 2018-11-16 NOTE — DISCHARGE NOTE ADULT - MEDICATION SUMMARY - MEDICATIONS TO STOP TAKING
I will STOP taking the medications listed below when I get home from the hospital:    colchicine 0.6 mg oral tablet  -- 1 tab(s) by mouth once a day I will STOP taking the medications listed below when I get home from the hospital:    Senna 8.6 mg oral tablet  -- 1 tab(s) by mouth once a day (at bedtime)    colchicine 0.6 mg oral tablet  -- 1 tab(s) by mouth once a day

## 2018-11-16 NOTE — DISCHARGE NOTE ADULT - CARE PROVIDER_API CALL
Marquez Morse), Internal Medicine  [address] [place] [address]  Phone: [phone]  Fax: [phone] Marquez Morse), Internal Medicine  [address] [place] [address]  Phone: [phone]  Fax: [phone]    Jaison Khalil), Critical Care Medicine; Internal Medicine; Pulmonary Disease  [address] [place] [address]  Phone: [phone]  Fax: [phone]    Hon KRISTEN Lei), Hematology; Internal Medicine; Medical Oncology  [address] [facility]  [address] [place] [address]  Phone: [phone]  Fax: [phone]

## 2018-11-16 NOTE — DISCHARGE NOTE ADULT - REASON FOR ADMISSION
pt is a 81yo female with pmhx of dementia [facility] ems reports pt tripped over someone else's walker, pt walks without assistance at baseline. pt found to be febrile on arrival 100.9 Admitted for septic workup and evaluation,send blood and urine cx,serial lactate levels,monitor vitals closley,ivfs hydration,monitor urine output and renal profile,iv abx initiated  pt without complaints. Daughter is at bedside and states patient has a steady gait Found to have DVT and started on full dose AC

## 2018-11-17 DIAGNOSIS — R41.0 DISORIENTATION, UNSPECIFIED: ICD-10-CM

## 2018-11-17 LAB
ANION GAP SERPL CALC-SCNC: 9 MMOL/L — SIGNIFICANT CHANGE UP (ref 5–17)
BUN SERPL-MCNC: 12 MG/DL — SIGNIFICANT CHANGE UP (ref 7–23)
CALCIUM SERPL-MCNC: 8.7 MG/DL — SIGNIFICANT CHANGE UP (ref 8.5–10.1)
CHLORIDE SERPL-SCNC: 112 MMOL/L — HIGH (ref 96–108)
CO2 SERPL-SCNC: 26 MMOL/L — SIGNIFICANT CHANGE UP (ref 22–31)
CREAT SERPL-MCNC: 0.81 MG/DL — SIGNIFICANT CHANGE UP (ref 0.5–1.3)
GLUCOSE SERPL-MCNC: 78 MG/DL — SIGNIFICANT CHANGE UP (ref 70–99)
HCT VFR BLD CALC: 28.2 % — LOW (ref 34.5–45)
HGB BLD-MCNC: 9.7 G/DL — LOW (ref 11.5–15.5)
INR BLD: 1.15 RATIO — SIGNIFICANT CHANGE UP (ref 0.88–1.16)
MCHC RBC-ENTMCNC: 34.4 GM/DL — SIGNIFICANT CHANGE UP (ref 32–36)
MCHC RBC-ENTMCNC: 34.9 PG — HIGH (ref 27–34)
MCV RBC AUTO: 101.4 FL — HIGH (ref 80–100)
NRBC # BLD: 0 /100 WBCS — SIGNIFICANT CHANGE UP (ref 0–0)
PLATELET # BLD AUTO: 196 K/UL — SIGNIFICANT CHANGE UP (ref 150–400)
POTASSIUM SERPL-MCNC: 3.1 MMOL/L — LOW (ref 3.5–5.3)
POTASSIUM SERPL-SCNC: 3.1 MMOL/L — LOW (ref 3.5–5.3)
PROTHROM AB SERPL-ACNC: 13.1 SEC — HIGH (ref 10–12.9)
RBC # BLD: 2.78 M/UL — LOW (ref 3.8–5.2)
RBC # FLD: 13.2 % — SIGNIFICANT CHANGE UP (ref 10.3–14.5)
SODIUM SERPL-SCNC: 147 MMOL/L — HIGH (ref 135–145)
WBC # BLD: 5.7 K/UL — SIGNIFICANT CHANGE UP (ref 3.8–10.5)
WBC # FLD AUTO: 5.7 K/UL — SIGNIFICANT CHANGE UP (ref 3.8–10.5)

## 2018-11-17 RX ORDER — RISPERIDONE 4 MG/1
0.5 TABLET ORAL
Qty: 0 | Refills: 0 | Status: DISCONTINUED | OUTPATIENT
Start: 2018-11-17 | End: 2018-11-20

## 2018-11-17 RX ORDER — OLANZAPINE 15 MG/1
5 TABLET, FILM COATED ORAL EVERY 6 HOURS
Qty: 0 | Refills: 0 | Status: DISCONTINUED | OUTPATIENT
Start: 2018-11-17 | End: 2018-11-30

## 2018-11-17 RX ORDER — POTASSIUM CHLORIDE 20 MEQ
20 PACKET (EA) ORAL
Qty: 0 | Refills: 0 | Status: COMPLETED | OUTPATIENT
Start: 2018-11-17 | End: 2018-11-17

## 2018-11-17 RX ADMIN — SERTRALINE 50 MILLIGRAM(S): 25 TABLET, FILM COATED ORAL at 18:13

## 2018-11-17 RX ADMIN — Medication 20 MILLIEQUIVALENT(S): at 20:23

## 2018-11-17 RX ADMIN — RIVAROXABAN 15 MILLIGRAM(S): KIT at 18:13

## 2018-11-17 RX ADMIN — PANTOPRAZOLE SODIUM 40 MILLIGRAM(S): 20 TABLET, DELAYED RELEASE ORAL at 05:10

## 2018-11-17 RX ADMIN — Medication 20 MILLIEQUIVALENT(S): at 22:24

## 2018-11-17 RX ADMIN — RIVAROXABAN 15 MILLIGRAM(S): KIT at 05:10

## 2018-11-17 RX ADMIN — SERTRALINE 25 MILLIGRAM(S): 25 TABLET, FILM COATED ORAL at 18:13

## 2018-11-17 RX ADMIN — Medication 100 MILLIGRAM(S): at 05:10

## 2018-11-17 RX ADMIN — RISPERIDONE 0.5 MILLIGRAM(S): 4 TABLET ORAL at 18:13

## 2018-11-17 RX ADMIN — MIRTAZAPINE 15 MILLIGRAM(S): 45 TABLET, ORALLY DISINTEGRATING ORAL at 22:24

## 2018-11-17 RX ADMIN — Medication 100 MILLIGRAM(S): at 18:13

## 2018-11-17 NOTE — PROGRESS NOTE ADULT - PROBLEM SELECTOR PLAN 2
Increased confusion and delirious state reported this morning and constant observation renewed .Patient was trying to ambulate with assistance but was found to be  unsteady today ,she was trying to get out of the bed .She was independent walker before admission as per daughters and no falls in a past reported ( except this admission associated with mechanical trip) PT reeval requested ,may require rehab ,also may need xarelto to be substituted with lovenox ( if goes to [facility] ) or IVC filter may be an option if gait remains unsteady .case was d/w Dr Khalil ,spoke to daughter Tameka on a phone

## 2018-11-17 NOTE — PROGRESS NOTE ADULT - SUBJECTIVE AND OBJECTIVE BOX
PROGRESS NOTE  Patient is a 82y old  Female who presents with a chief complaint of pt is a 83yo female with pmhx of dementia and htn [facility] ems s/p fall. ems reports pt tripped over someone else's walker, pt walks without assistance at baseline. pt found to be febrile on arrival 100.9 Admitted for septic workup and evaluation,send blood and urine cx,serial lactate levels,monitor vitals closley,ivfs hydration,monitor urine output and renal profile,iv abx initiated  pt without complaints. Daughter is at bedside and states patient has a steady gait Found to have DVT and started on full dose AC (17 Nov 2018 14:07)  Chart and available morning labs /imaging are reviewed electronically , urgent issues addressed . More information  is being added upon completion of rounds , when more information is collected and management discussed with consultants , medical staff and social service/case management on the floor   OVERNIGHT  Increased confusion and delirious state reported this morning and constant observation renewed .Patient was trying to ambulate with assistance but was found to be  unsteady today ,she was trying to get out of the bed .She was independent walker before admission as per daughters and no falls in a past reported ( except this admission associated with mechanical trip) PT reeval requested ,may require rehab ,also may need xarelto to be substituted with lovenox ( if goes to [facility] ) or IVC filter may be an option if gait remains unsteady .case was d/w Dr Khalil ,spoke to daughter Tameka on a phone   HPI:  pt is a 83yo female with pmhx of dementia and htn [facility] ems s/p fall. ems reports pt tripped over someone else's walker, pt walks without assistance at baseline. pt found to be febrile on arrival 100.9 Admitted for septic workup and evaluation,send blood and urine cx,serial lactate levels,monitor vitals closley,ivfs hydration,monitor urine output and renal profile,iv abx initiated  pt without complaints. Daughter is at bedside and states patient has a steady gait Found to have DVT and started on full dose AC (14 Nov 2018 21:23)    PAST MEDICAL & SURGICAL HISTORY:  Anxiety  HTN (hypertension)  Depression  Dementia  No significant past surgical history      MEDICATIONS  (STANDING):  docusate sodium 100 milliGRAM(s) Oral two times a day  mirtazapine 15 milliGRAM(s) Oral at bedtime  pantoprazole    Tablet 40 milliGRAM(s) Oral before breakfast  risperiDONE   Tablet 0.5 milliGRAM(s) Oral two times a day  rivaroxaban 15 milliGRAM(s) Oral two times a day  sertraline 25 milliGRAM(s) Oral daily  sertraline 50 milliGRAM(s) Oral daily    MEDICATIONS  (PRN):  haloperidol    Injectable 1 milliGRAM(s) IntraMuscular every 6 hours PRN Agitation  OLANZapine Injectable 5 milliGRAM(s) IntraMuscular every 6 hours PRN Acute agitation      OBJECTIVE    T(C): 36.6 (11-17-18 @ 14:53), Max: 36.6 (11-17-18 @ 14:53)  HR: 95 (11-17-18 @ 14:53) (95 - 95)  BP: 162/84 (11-17-18 @ 14:53) (162/84 - 162/84)  RR: 16 (11-17-18 @ 14:53) (16 - 16)  SpO2: 94% (11-17-18 @ 14:53) (94% - 94%)  Wt(kg): --  I&O's Summary  ROS is unobtainable due to lethargy and confusion   PHYSICAL EXAM: l cheek hematoma   Constitutional: NAD,   HEENT:  conjunctiva clear   Neck: No JVD  Respiratory: CTAB  Cardiovascular: S1 and S2  Gastrointestinal: BS+, soft, NT/ND  Extremities: No peripheral edema  Neurological: unable to obtain  Psychiatric: unable to obtain  : No Guzman  Skin: dry  Dialysis Access: Not applicable  LABS:                        9.7    5.70  )-----------( 196      ( 17 Nov 2018 07:15 )             28.2     11-17    147<H>  |  112<H>  |  12  ----------------------------<  78  3.1<L>   |  26  |  0.81    Ca    8.7      17 Nov 2018 07:15      CAPILLARY BLOOD GLUCOSE        PT/INR - ( 17 Nov 2018 07:15 )   PT: 13.1 sec;   INR: 1.15 ratio               Culture - Blood (collected 14 Nov 2018 22:10)  Source: .Blood Blood-Peripheral  Preliminary Report (15 Nov 2018 23:02):    No growth to date.    Culture - Blood (collected 14 Nov 2018 22:10)  Source: .Blood Blood-Peripheral  Preliminary Report (15 Nov 2018 23:02):    No growth to date.    Culture - Urine (collected 14 Nov 2018 21:51)  Source: .Urine Catheterized  Final Report (15 Nov 2018 22:04):    No growth      RADIOLOGY & ADDITIONAL TESTS: < from: Xray Chest 1 View- PORTABLE-Routine (11.14.18 @ 19:45) >  EXAM:  XR CHEST PORTABLE ROUTINE 1V                            PROCEDURE DATE:  11/14/2018          INTERPRETATION:  Clinical information: Fall, pain    Portable study of the chest, 7:49 PM    No prior chest x-rays present for comparison  Mildly elevated right hemidiaphragm. right hemidiaphragm.  The aorta shows atherosclerotic changes. The lungs are clear. There is no   sign of infiltrate effusion or congestive failure. Heart size is within   normal limits. Hilar regions and mediastinal contours are unremarkable.    The bony thorax appears intact.    IMPRESSION:    No active disease.    < end of copied text >     reviewed elctronically  ASSESSMENT/PLAN:

## 2018-11-17 NOTE — PROGRESS NOTE ADULT - SUBJECTIVE AND OBJECTIVE BOX
Adriana Swanson NW P   ALLERGY nka  CONTACT Dtr Tameka Campos  Self     VITALS/LABS                           11/17/2018 afeb 95 160/60 16 94%   11/17/2018 W 5.7 Hb 9.7 Plt 196 INR 1.15 Na 147 K 3.1 CO2 26 Cr .8     REVIEW OF SYMPTOMS    Able to give ROS  NO     PHYSICAL EXAM    HEENT Unremarkable PERRLA atraumatic   RESP Fair air entry EXP prolonged    Harsh breath sound Resp distres mild   CARDIAC S1 S2 No S3     NO JVD    ABDOMEN SOFT BS PRESENT NOT DISTENDED No hepatosplenomegaly PEDAL EDEMA present No calf tenderness  NO rash   GENERAL Not TOXIC looking    GLOBAL ISSUE/BEST PRACTICE:      PROBLEM: HOB elevation:   y            PROBLEM: Stress ulcer proph:    protonix 40 911/14)                       PROBLEM: VTE prophylaxis:      lvnx 50.2 (11/14) ? Rivaroxaban 15.2 (11/17)  PROBLEM: Glycemic control:    na  PROBLEM: Nutrition:    watkins (11/14)   PROBLEM: Advanced directive: na     PROBLEM: Allergies:  na  EVENT 11/17/2018 DW fam in detail re pros and cons of doac v vka v ivcf   EVENT AGITATION 11/14/2018 Constant observation     PATIENT MANAGEMENT   11/14/2018 ADMISSION [facility] P DR LEVY TY   11/14/2018 83yo female with pmhx of dementia and htn [facility] ems s/p fall. ems reports pt tripped over someone else's walker, pt walks without assistance at baseline. pt found to be febrile on arrival 100.9. pt without complaints.   pmd: irving    Pt was found to have DVT and was started on full dose lovenox 11/14 She was also found to have patchy pl based opacity rml Case was dw IR and Dr Farah felt that ctnb should not be done but a repeat CT ch should be done in 2 m Dr Kaiser was consulted 11/17/2018 to decide re ivcf vs Xarelto and this was dw family in detail on 11/17/2018     ASSESSMENT/RECOMMENDATIONS  11/14/2018 ADMISSION [facility] P DR LEVY TY    DVT   11/17/2018 Started on Xarelto 15.2 (11/17)   11/16/2018 DW Dr Ty and with family Pt is steady on her feet and this was a trip fall Pros and cons of doac explained to pt family Xarelto agreed upon by Dr Ty   11/14/2018 V duplex R leg DVT     ELEVATED CREAT   Monitored serially Improved     PATCHY OPACITY RML ON CT CHEST DONE 11/14  This could be a pulmonary infarct Will dw IR to see if ctnb is feasible Will need serial followup and will need PET after DC   11/16/2018 I dw Dr Farah He feels that instead of ctnb we should rep[eat CT Chest in 2 m Message sent to Dr Ty     PNEUMONIA   Bacterial pneumonia unlikely in absence of procalcitonin leukocytosis and fever  DCed abio (11/15/2018)   11/14 bc n 11/14 uc n     TIME SPENT Over 25 minutes aggregate care time spent on encounter; activities included   direct patient care, counseling and/or coordinating care reviewing notes, lab data/ imaging , discussion with multidisciplinary team/ patient  /family. Risks, benefits, alternatives  discussed in detail.

## 2018-11-17 NOTE — PROGRESS NOTE ADULT - SUBJECTIVE AND OBJECTIVE BOX
Patient is a 82y Female with a known history of :  ANNY (acute kidney injury) (N17.9)  Prophylactic measure (Z29.9)  Anxiety (F41.9)  Dementia (F03.90)  Depression (F32.9)  HTN (hypertension) (I10)  PNA (pneumonia) (J18.9)  DVT (deep venous thrombosis) (I82.409)  Fall (W19.XXXA)  Suspected deep vein thrombosis ([id number])    HPI:  pt is a 83yo female with pmhx of dementia and htn [facility] ems s/p fall. ems reports pt tripped over someone else's walker, pt walks without assistance at baseline. pt found to be febrile on arrival 100.9 Admitted for septic workup and evaluation,send blood and urine cx,serial lactate levels,monitor vitals closley,ivfs hydration,monitor urine output and renal profile,iv abx initiated  pt without complaints. Daughter is at bedside and states patient has a steady gait Found to have DVT and started on full dose AC (14 Nov 2018 21:23)      REVIEW OF SYSTEMS:    CONSTITUTIONAL: No fever, weight loss, or fatigue  EYES: No eye pain, visual disturbances, or discharge  ENMT:  No difficulty hearing, tinnitus, vertigo; No sinus or throat pain  NECK: No pain or stiffness  BREASTS: No pain, masses, or nipple discharge  RESPIRATORY: No cough, wheezing, chills or hemoptysis; No shortness of breath  CARDIOVASCULAR: No chest pain, palpitations, dizziness, or leg swelling  GASTROINTESTINAL: No abdominal or epigastric pain. No nausea, vomiting, or hematemesis; No diarrhea or constipation. No melena or hematochezia.  GENITOURINARY: No dysuria, frequency, hematuria, or incontinence  NEUROLOGICAL: No headaches, memory loss, loss of strength, numbness, or tremors  SKIN: No itching, burning, rashes, or lesions   LYMPH NODES: No enlarged glands  ENDOCRINE: No heat or cold intolerance; No hair loss  MUSCULOSKELETAL: No joint pain or swelling; No muscle, back, or extremity pain  PSYCHIATRIC: No depression, anxiety, mood swings, or difficulty sleeping  HEME/LYMPH: No easy bruising, or bleeding gums  ALLERGY AND IMMUNOLOGIC: No hives or eczema    MEDICATIONS  (STANDING):  docusate sodium 100 milliGRAM(s) Oral two times a day  mirtazapine 15 milliGRAM(s) Oral at bedtime  pantoprazole    Tablet 40 milliGRAM(s) Oral before breakfast  rivaroxaban 15 milliGRAM(s) Oral two times a day  sertraline 25 milliGRAM(s) Oral daily  sertraline 50 milliGRAM(s) Oral daily    MEDICATIONS  (PRN):  haloperidol    Injectable 1 milliGRAM(s) IntraMuscular every 6 hours PRN Agitation      ALLERGIES: No Known Allergies      FAMILY HISTORY:  No pertinent family history in first degree relatives      PHYSICAL EXAMINATION:  -----------------------------  T(C): 36.6 (11-16-18 @ 13:49), Max: 36.6 (11-16-18 @ 13:49)  HR: 105 (11-16-18 @ 13:49) (105 - 105)  BP: 151/81 (11-16-18 @ 13:49) (151/81 - 151/81)  RR: 18 (11-16-18 @ 13:49) (18 - 18)  SpO2: 97% (11-16-18 @ 13:49) (97% - 97%)  Wt(kg): --    11-15 @ 07:01  -  11-16 @ 07:00  --------------------------------------------------------  IN:    Oral Fluid: 120 mL  Total IN: 120 mL    OUT:  Total OUT: 0 mL    Total NET: 120 mL            Constitutional: well developed, normal appearance, well groomed, well nourished, no deformities and no acute distress.   Eyes: the conjunctiva exhibited no abnormalities and the eyelids demonstrated no xanthelasmas.   HEENT: normal oral mucosa, no oral pallor and no oral cyanosis.   Neck: normal jugular venous A waves present, normal jugular venous V waves present and no jugular venous lao A waves.   Pulmonary: no respiratory distress, normal respiratory rhythm and effort, no accessory muscle use and lungs were clear to auscultation bilaterally.   Cardiovascular: heart rate and rhythm were normal, normal S1 and S2 and no murmur, gallop, rub, heave or thrill are present.   Abdomen: soft, non-tender, no hepato-splenomegaly and no abdominal mass palpated.   Musculoskeletal: the gait could not be assessed..   Extremities: no clubbing of the fingernails, no localized cyanosis, no petechial hemorrhages and no ischemic changes.   Skin: normal skin color and pigmentation, no rash, no venous stasis, no skin lesions, no skin ulcer and no xanthoma was observed.   Psychiatric: oriented to person, place, and time, the affect was normal, the mood was normal and not feeling anxious.     LABS:   --------  11-16    146<H>  |  111<H>  |  9   ----------------------------<  96  3.0<L>   |  24  |  0.92    Ca    8.7      16 Nov 2018 08:37  Phos  3.2     11-15  Mg     2.2     11-15    TPro  6.5  /  Alb  2.8<L>  /  TBili  0.9  /  DBili  x   /  AST  26  /  ALT  17  /  AlkPhos  89  11-15                         10.1   7.09  )-----------( 199      ( 16 Nov 2018 08:37 )             29.5     PT/INR - ( 15 Nov 2018 08:19 )   PT: 14.3 sec;   INR: 1.26 ratio             11-15 @ 08:19 CPK total:--, CKMB --, Troponin I - .035 ng/mL    161 mg/dL, 106 mg/dL, 39 mg/dL<L>, 79 mg/dL        RADIOLOGY:  -----------------        ECG:

## 2018-11-17 NOTE — BEHAVIORAL HEALTH ASSESSMENT NOTE - HPI (INCLUDE ILLNESS QUALITY, SEVERITY, DURATION, TIMING, CONTEXT, MODIFYING FACTORS, ASSOCIATED SIGNS AND SYMPTOMS)
Reason for Admission: pt is a 83yo female with pmhx of dementia and htn [facility] ems s/p fall. ems reports pt tripped over someone else's walker, pt walks without assistance at baseline. pt found to be febrile on arrival 100.9 Admitted for septic workup and evaluation,send blood and urine cx,serial lactate levels,monitor vitals closley, ivfs hydration,monitor urine output and renal profile,iv abx initiated  pt without complaints. Daughter is at bedside and states patient has a steady gait Found to have DVT and started on full dose AC.  Patient presents with confusion and unable to answer even the most basic question. Daughter reports that she is confused at baseline, but she became significantly worse since she was admitted to the hospital.

## 2018-11-17 NOTE — BEHAVIORAL HEALTH ASSESSMENT NOTE - SUMMARY
83 y/o WWF, with no prior psychiatric hospitalizations, history of dementia admitted to the hospital status post fall.

## 2018-11-17 NOTE — PROGRESS NOTE ADULT - SUBJECTIVE AND OBJECTIVE BOX
Interval History:    CENTRAL LINE:   [  ] YES       [  ] NO  WOO:                 [  ] YES       [  ] NO         REVIEW OF SYSTEMS:  All Systems below were reviewed and are negative [  ]  HEENT:  ID:  Pulmonary:  Cardiac:  GI:  Renal:  Musculoskeletal:  All other systems above were reviewed and are negative   [  ]      MEDICATIONS  (STANDING):  docusate sodium 100 milliGRAM(s) Oral two times a day  mirtazapine 15 milliGRAM(s) Oral at bedtime  pantoprazole    Tablet 40 milliGRAM(s) Oral before breakfast  risperiDONE   Tablet 0.5 milliGRAM(s) Oral two times a day  rivaroxaban 15 milliGRAM(s) Oral two times a day  sertraline 25 milliGRAM(s) Oral daily  sertraline 50 milliGRAM(s) Oral daily    MEDICATIONS  (PRN):  haloperidol    Injectable 1 milliGRAM(s) IntraMuscular every 6 hours PRN Agitation  OLANZapine Injectable 5 milliGRAM(s) IntraMuscular every 6 hours PRN Acute agitation      Vital Signs Last 24 Hrs  T(C): 36.6 (17 Nov 2018 14:53), Max: 36.6 (17 Nov 2018 14:53)  T(F): 97.8 (17 Nov 2018 14:53), Max: 97.8 (17 Nov 2018 14:53)  HR: 95 (17 Nov 2018 14:53) (95 - 95)  BP: 162/84 (17 Nov 2018 14:53) (162/84 - 162/84)  BP(mean): --  RR: 16 (17 Nov 2018 14:53) (16 - 16)  SpO2: 94% (17 Nov 2018 14:53) (94% - 94%)    I&O's Summary      PHYSICAL EXAM:  HEENT: NC/AT; PERRLA  Neck: Soft; no tenderness  Lungs: CTA bilaterally; no wheezing.   Heart:  Abdomen:  Genital/ Rectal:  Extremities:  Neurologic:  Vascular:      LABORATORY:    CBC Full  -  ( 17 Nov 2018 07:15 )  WBC Count : 5.70 K/uL  Hemoglobin : 9.7 g/dL  Hematocrit : 28.2 %  Platelet Count - Automated : 196 K/uL  Mean Cell Volume : 101.4 fl  Mean Cell Hemoglobin : 34.9 pg  Mean Cell Hemoglobin Concentration : 34.4 gm/dL  Auto Neutrophil # : x  Auto Lymphocyte # : x  Auto Monocyte # : x  Auto Eosinophil # : x  Auto Basophil # : x  Auto Neutrophil % : x  Auto Lymphocyte % : x  Auto Monocyte % : x  Auto Eosinophil % : x  Auto Basophil % : x      ESR:                   11-15 @ 08:19  --    C-Reactive Protein:     11-15 @ 08:19  --    Procalcitonin:           11-15 @ 08:19   <0.05      11-17    147<H>  |  112<H>  |  12  ----------------------------<  78  3.1<L>   |  26  |  0.81    Ca    8.7      17 Nov 2018 07:15            Assessment and Plan:          Alfonso Ashford MD   [phone]. She is sleeping in bed  No fevers noted.     MEDICATIONS  (STANDING):  docusate sodium 100 milliGRAM(s) Oral two times a day  mirtazapine 15 milliGRAM(s) Oral at bedtime  pantoprazole    Tablet 40 milliGRAM(s) Oral before breakfast  risperiDONE   Tablet 0.5 milliGRAM(s) Oral two times a day  rivaroxaban 15 milliGRAM(s) Oral two times a day  sertraline 25 milliGRAM(s) Oral daily  sertraline 50 milliGRAM(s) Oral daily    MEDICATIONS  (PRN):  haloperidol    Injectable 1 milliGRAM(s) IntraMuscular every 6 hours PRN Agitation  OLANZapine Injectable 5 milliGRAM(s) IntraMuscular every 6 hours PRN Acute agitation      Vital Signs Last 24 Hrs  T(C): 36.6 (17 Nov 2018 14:53), Max: 36.6 (17 Nov 2018 14:53)  T(F): 97.8 (17 Nov 2018 14:53), Max: 97.8 (17 Nov 2018 14:53)  HR: 95 (17 Nov 2018 14:53) (95 - 95)  BP: 162/84 (17 Nov 2018 14:53) (162/84 - 162/84)  BP(mean): --  RR: 16 (17 Nov 2018 14:53) (16 - 16)  SpO2: 94% (17 Nov 2018 14:53) (94% - 94%)      PHYSICAL EXAM:  HEENT: NC/AT; PERRLA  Neck: Soft; no tenderness  Lungs: Coarse BS bilaterally; no wheezing.   Heart: RRR; no murmurs.  Abdomen: Sft; no masses.  Extremities: No ulcers.  Neurologic: Awake.      LABORATORY:    CBC Full  -  ( 17 Nov 2018 07:15 )  WBC Count : 5.70 K/uL  Hemoglobin : 9.7 g/dL  Hematocrit : 28.2 %  Platelet Count - Automated : 196 K/uL  Mean Cell Volume : 101.4 fl  Mean Cell Hemoglobin : 34.9 pg  Mean Cell Hemoglobin Concentration : 34.4 gm/dL  Auto Neutrophil # : x  Auto Lymphocyte # : x  Auto Monocyte # : x  Auto Eosinophil # : x  Auto Basophil # : x  Auto Neutrophil % : x  Auto Lymphocyte % : x  Auto Monocyte % : x  Auto Eosinophil % : x  Auto Basophil % : x      ESR:                   11-15 @ 08:19  --    C-Reactive Protein:     11-15 @ 08:19  --    Procalcitonin:           11-15 @ 08:19   <0.05      11-17    147<H>  |  112<H>  |  12  ----------------------------<  78  3.1<L>   |  26  |  0.81    Ca    8.7      17 Nov 2018 07:15      Assessment and Plan:    1. RML pleural based density.  2. RLE DVT.    . Low suspicion for pneumonia.   . Monitor the patient off antibiotics.  . Continue Lovenox for DVT.           Alfonso Ashford MD   [phone].

## 2018-11-17 NOTE — PROGRESS NOTE ADULT - SUBJECTIVE AND OBJECTIVE BOX
Patient is a 82y Female with past medical history of   increase of BUN/creatinine        presenting with   possible acute kidney injury     who reports no complaints overnight.    REVIEW OF SYSTEMS:    unable to obtain review of system  Allergies    No Known Allergies    Intolerances        MEDICATIONS  (STANDING):  docusate sodium 100 milliGRAM(s) Oral two times a day  enoxaparin Injectable 50 milliGRAM(s) SubCutaneous two times a day  mirtazapine 15 milliGRAM(s) Oral at bedtime  pantoprazole    Tablet 40 milliGRAM(s) Oral before breakfast  sertraline 25 milliGRAM(s) Oral daily  sertraline 50 milliGRAM(s) Oral daily                                                9.7    5.70  )-----------( 196      ( 17 Nov 2018 07:15 )             28.2       CBC Full  -  ( 17 Nov 2018 07:15 )  WBC Count : 5.70 K/uL  Hemoglobin : 9.7 g/dL  Hematocrit : 28.2 %  Platelet Count - Automated : 196 K/uL  Mean Cell Volume : 101.4 fl  Mean Cell Hemoglobin : 34.9 pg  Mean Cell Hemoglobin Concentration : 34.4 gm/dL  Auto Neutrophil # : x  Auto Lymphocyte # : x  Auto Monocyte # : x  Auto Eosinophil # : x  Auto Basophil # : x  Auto Neutrophil % : x  Auto Lymphocyte % : x  Auto Monocyte % : x  Auto Eosinophil % : x  Auto Basophil % : x      11-17    147<H>  |  112<H>  |  12  ----------------------------<  78  3.1<L>   |  26  |  0.81    Ca    8.7      17 Nov 2018 07:15        CAPILLARY BLOOD GLUCOSE          Vital Signs Last 24 Hrs  T(C): --  T(F): --  HR: --  BP: --  BP(mean): --  RR: --  SpO2: --        PT/INR - ( 17 Nov 2018 07:15 )   PT: 13.1 sec;   INR: 1.15 ratio             PHYSICAL EXAM:    Constitutional: NAD,   HEENT:  conjunctiva clear   Neck: No JVD  Respiratory: CTAB  Cardiovascular: S1 and S2  Gastrointestinal: BS+, soft, NT/ND  Extremities: No peripheral edema  Neurological: unable to obtain  Psychiatric: unable to obtain  : No Guzman  Skin: dry  Dialysis Access: Not applicable

## 2018-11-18 LAB
ALBUMIN SERPL ELPH-MCNC: 2.8 G/DL — LOW (ref 3.3–5)
ALP SERPL-CCNC: 83 U/L — SIGNIFICANT CHANGE UP (ref 40–120)
ALT FLD-CCNC: 24 U/L — SIGNIFICANT CHANGE UP (ref 12–78)
ANION GAP SERPL CALC-SCNC: 11 MMOL/L — SIGNIFICANT CHANGE UP (ref 5–17)
AST SERPL-CCNC: 41 U/L — HIGH (ref 15–37)
BILIRUB SERPL-MCNC: 1 MG/DL — SIGNIFICANT CHANGE UP (ref 0.2–1.2)
BUN SERPL-MCNC: 12 MG/DL — SIGNIFICANT CHANGE UP (ref 7–23)
CALCIUM SERPL-MCNC: 8.4 MG/DL — LOW (ref 8.5–10.1)
CHLORIDE SERPL-SCNC: 111 MMOL/L — HIGH (ref 96–108)
CO2 SERPL-SCNC: 24 MMOL/L — SIGNIFICANT CHANGE UP (ref 22–31)
CREAT SERPL-MCNC: 0.88 MG/DL — SIGNIFICANT CHANGE UP (ref 0.5–1.3)
GLUCOSE SERPL-MCNC: 101 MG/DL — HIGH (ref 70–99)
HCT VFR BLD CALC: 30.2 % — LOW (ref 34.5–45)
HGB BLD-MCNC: 10.4 G/DL — LOW (ref 11.5–15.5)
INR BLD: 1.59 RATIO — HIGH (ref 0.88–1.16)
MAGNESIUM SERPL-MCNC: 1.9 MG/DL — SIGNIFICANT CHANGE UP (ref 1.6–2.6)
MCHC RBC-ENTMCNC: 34.4 GM/DL — SIGNIFICANT CHANGE UP (ref 32–36)
MCHC RBC-ENTMCNC: 34.4 PG — HIGH (ref 27–34)
MCV RBC AUTO: 100 FL — SIGNIFICANT CHANGE UP (ref 80–100)
NRBC # BLD: 0 /100 WBCS — SIGNIFICANT CHANGE UP (ref 0–0)
PHOSPHATE SERPL-MCNC: 2.7 MG/DL — SIGNIFICANT CHANGE UP (ref 2.5–4.5)
PLATELET # BLD AUTO: 202 K/UL — SIGNIFICANT CHANGE UP (ref 150–400)
POTASSIUM SERPL-MCNC: 3.3 MMOL/L — LOW (ref 3.5–5.3)
POTASSIUM SERPL-SCNC: 3.3 MMOL/L — LOW (ref 3.5–5.3)
PROT SERPL-MCNC: 6.6 G/DL — SIGNIFICANT CHANGE UP (ref 6–8.3)
PROTHROM AB SERPL-ACNC: 18.4 SEC — HIGH (ref 10–12.9)
RBC # BLD: 3.02 M/UL — LOW (ref 3.8–5.2)
RBC # FLD: 12.9 % — SIGNIFICANT CHANGE UP (ref 10.3–14.5)
SODIUM SERPL-SCNC: 146 MMOL/L — HIGH (ref 135–145)
WBC # BLD: 6.95 K/UL — SIGNIFICANT CHANGE UP (ref 3.8–10.5)
WBC # FLD AUTO: 6.95 K/UL — SIGNIFICANT CHANGE UP (ref 3.8–10.5)

## 2018-11-18 RX ORDER — POTASSIUM CHLORIDE 20 MEQ
20 PACKET (EA) ORAL
Qty: 0 | Refills: 0 | Status: COMPLETED | OUTPATIENT
Start: 2018-11-18 | End: 2018-11-18

## 2018-11-18 RX ADMIN — PANTOPRAZOLE SODIUM 40 MILLIGRAM(S): 20 TABLET, DELAYED RELEASE ORAL at 06:13

## 2018-11-18 RX ADMIN — RISPERIDONE 0.5 MILLIGRAM(S): 4 TABLET ORAL at 17:30

## 2018-11-18 RX ADMIN — Medication 20 MILLIEQUIVALENT(S): at 17:31

## 2018-11-18 RX ADMIN — SERTRALINE 50 MILLIGRAM(S): 25 TABLET, FILM COATED ORAL at 11:48

## 2018-11-18 RX ADMIN — RISPERIDONE 0.5 MILLIGRAM(S): 4 TABLET ORAL at 06:13

## 2018-11-18 RX ADMIN — Medication 20 MILLIEQUIVALENT(S): at 15:46

## 2018-11-18 RX ADMIN — RIVAROXABAN 15 MILLIGRAM(S): KIT at 17:30

## 2018-11-18 RX ADMIN — SERTRALINE 25 MILLIGRAM(S): 25 TABLET, FILM COATED ORAL at 11:48

## 2018-11-18 RX ADMIN — Medication 100 MILLIGRAM(S): at 06:13

## 2018-11-18 RX ADMIN — MIRTAZAPINE 15 MILLIGRAM(S): 45 TABLET, ORALLY DISINTEGRATING ORAL at 22:57

## 2018-11-18 RX ADMIN — Medication 100 MILLIGRAM(S): at 17:30

## 2018-11-18 RX ADMIN — RIVAROXABAN 15 MILLIGRAM(S): KIT at 06:13

## 2018-11-18 NOTE — PROGRESS NOTE ADULT - SUBJECTIVE AND OBJECTIVE BOX
Patient is a 82y Female with a known history of :  Delirium (R41.0)  ANNY (acute kidney injury) (N17.9)  Prophylactic measure (Z29.9)  Anxiety (F41.9)  Dementia (F03.90)  Depression (F32.9)  HTN (hypertension) (I10)  PNA (pneumonia) (J18.9)  DVT (deep venous thrombosis) (I82.409)  Fall (W19.XXXA)  Suspected deep vein thrombosis ([id number])    HPI:  pt is a 81yo female with pmhx of dementia and htn [facility] ems s/p fall. ems reports pt tripped over someone else's walker, pt walks without assistance at baseline. pt found to be febrile on arrival 100.9 Admitted for septic workup and evaluation,send blood and urine cx,serial lactate levels,monitor vitals closley,ivfs hydration,monitor urine output and renal profile,iv abx initiated  pt without complaints. Daughter is at bedside and states patient has a steady gait Found to have DVT and started on full dose AC (14 Nov 2018 21:23)      REVIEW OF SYSTEMS:    CONSTITUTIONAL: No fever, weight loss, or fatigue  EYES: No eye pain, visual disturbances, or discharge  ENMT:  No difficulty hearing, tinnitus, vertigo; No sinus or throat pain  NECK: No pain or stiffness  BREASTS: No pain, masses, or nipple discharge  RESPIRATORY: No cough, wheezing, chills or hemoptysis; No shortness of breath  CARDIOVASCULAR: No chest pain, palpitations, dizziness, or leg swelling  GASTROINTESTINAL: No abdominal or epigastric pain. No nausea, vomiting, or hematemesis; No diarrhea or constipation. No melena or hematochezia.  GENITOURINARY: No dysuria, frequency, hematuria, or incontinence  NEUROLOGICAL: No headaches, memory loss, loss of strength, numbness, or tremors  SKIN: No itching, burning, rashes, or lesions   LYMPH NODES: No enlarged glands  ENDOCRINE: No heat or cold intolerance; No hair loss  MUSCULOSKELETAL: No joint pain or swelling; No muscle, back, or extremity pain  PSYCHIATRIC: No depression, anxiety, mood swings, or difficulty sleeping  HEME/LYMPH: No easy bruising, or bleeding gums  ALLERGY AND IMMUNOLOGIC: No hives or eczema    MEDICATIONS  (STANDING):  docusate sodium 100 milliGRAM(s) Oral two times a day  mirtazapine 15 milliGRAM(s) Oral at bedtime  pantoprazole    Tablet 40 milliGRAM(s) Oral before breakfast  risperiDONE   Tablet 0.5 milliGRAM(s) Oral two times a day  rivaroxaban 15 milliGRAM(s) Oral two times a day  sertraline 25 milliGRAM(s) Oral daily  sertraline 50 milliGRAM(s) Oral daily    MEDICATIONS  (PRN):  haloperidol    Injectable 1 milliGRAM(s) IntraMuscular every 6 hours PRN Agitation  OLANZapine Injectable 5 milliGRAM(s) IntraMuscular every 6 hours PRN Acute agitation      ALLERGIES: No Known Allergies      FAMILY HISTORY:  No pertinent family history in first degree relatives      PHYSICAL EXAMINATION:  -----------------------------  T(C): 37.1 (11-18-18 @ 05:48), Max: 37.1 (11-18-18 @ 05:48)  HR: 88 (11-18-18 @ 05:48) (88 - 95)  BP: 140/88 (11-18-18 @ 05:48) (138/72 - 162/84)  RR: 18 (11-18-18 @ 05:48) (16 - 18)  SpO2: 97% (11-18-18 @ 05:48) (94% - 97%)  Wt(kg): --    11-18 @ 07:01  -  11-18 @ 12:16  --------------------------------------------------------  IN:    Oral Fluid: 500 mL  Total IN: 500 mL    OUT:  Total OUT: 0 mL    Total NET: 500 mL            Constitutional: well developed, normal appearance, well groomed, well nourished, no deformities and no acute distress.   Eyes: the conjunctiva exhibited no abnormalities and the eyelids demonstrated no xanthelasmas.   HEENT: normal oral mucosa, no oral pallor and no oral cyanosis.   Neck: normal jugular venous A waves present, normal jugular venous V waves present and no jugular venous lao A waves.   Pulmonary: no respiratory distress, normal respiratory rhythm and effort, no accessory muscle use and lungs were clear to auscultation bilaterally.   Cardiovascular: heart rate and rhythm were normal, normal S1 and S2 and no murmur, gallop, rub, heave or thrill are present.   Abdomen: soft, non-tender, no hepato-splenomegaly and no abdominal mass palpated.   Musculoskeletal: the gait could not be assessed..   Extremities: no clubbing of the fingernails, no localized cyanosis, no petechial hemorrhages and no ischemic changes.   Skin: normal skin color and pigmentation, no rash, no venous stasis, no skin lesions, no skin ulcer and no xanthoma was observed.   Psychiatric: oriented to person, place, and time, the affect was normal, the mood was normal and not feeling anxious.     LABS:   --------  11-18    146<H>  |  111<H>  |  12  ----------------------------<  101<H>  3.3<L>   |  24  |  0.88    Ca    8.4<L>      18 Nov 2018 07:44  Phos  2.7     11-18  Mg     1.9     11-18    TPro  6.6  /  Alb  2.8<L>  /  TBili  1.0  /  DBili  x   /  AST  41<H>  /  ALT  24  /  AlkPhos  83  11-18                         10.4   6.95  )-----------( 202      ( 18 Nov 2018 07:44 )             30.2     PT/INR - ( 18 Nov 2018 07:44 )   PT: 18.4 sec;   INR: 1.59 ratio                     RADIOLOGY:  -----------------        ECG:

## 2018-11-18 NOTE — PROGRESS NOTE ADULT - PROBLEM SELECTOR PLAN 2
Increased confusion and delirious state reported this morning and constant observation renewed .Patient was trying to ambulate with assistance but was found to be  unsteady today ,she was trying to get out of the bed .She was independent walker before admission as per daughters and no falls in a past reported ( except this admission associated with mechanical trip) PT reeval requested ,may require rehab ,also may need xarelto to be substituted with lovenox ( if goes to [facility] ) or IVC filter may be an option if gait remains unsteady .case was d/w Dr Khalil ,spoke to daughter Tameka on a phone

## 2018-11-18 NOTE — CONSULT NOTE ADULT - SUBJECTIVE AND OBJECTIVE BOX
History of Present Illness:  82y Female with a history of dementia tripped over a walker at her facility and was admitted for a septic workup. She had a venous doppler that showed a DVT of her right leg. She was initially treated with Lovenox and  switched to Xarelto. Because of her falls I was requested to evaluate her for a possible IVC filter.    PAST MEDICAL & SURGICAL HISTORY:  Anxiety  HTN (hypertension)  Depression  Dementia  No significant past surgical history      Allergies    No Known Allergies    Intolerances        MEDICATIONS  (STANDING):  docusate sodium 100 milliGRAM(s) Oral two times a day  mirtazapine 15 milliGRAM(s) Oral at bedtime  pantoprazole    Tablet 40 milliGRAM(s) Oral before breakfast  risperiDONE   Tablet 0.5 milliGRAM(s) Oral two times a day  rivaroxaban 15 milliGRAM(s) Oral two times a day  sertraline 25 milliGRAM(s) Oral daily  sertraline 50 milliGRAM(s) Oral daily    MEDICATIONS  (PRN):  haloperidol    Injectable 1 milliGRAM(s) IntraMuscular every 6 hours PRN Agitation  OLANZapine Injectable 5 milliGRAM(s) IntraMuscular every 6 hours PRN Acute agitation      Social History:  Smoking History: denies  Alcohol Use: denies    REVIEW OF SYSTEMS :PATIENT HAS DEMENTIA AND HER CLINICAL INFO. WAS OBTAINED FROM HER DAUGHTER  CONSTITUTIONAL: generalized weakness. No  fevers or chills  EYES/ENT: No visual changes;  No vertigo or throat pain   NECK: No pain or stiffness  RESPIRATORY: No cough, wheezing, hemoptysis; No shortness of breath  CARDIOVASCULAR: No chest pain or palpitations  GASTROINTESTINAL: No abdominal or epigastric pain. No nausea, vomiting, or hematemesis; No diarrhea or constipation. No melena or hematochezia.  GENITOURINARY: No dysuria, frequency or hematuria  NEUROLOGICAL: No numbness or weakness  SKIN: No itching, burning, rashes, or lesions   Vascular:  No lower extremity claudication, pedal rest pain or digital ulcers  All other review of systems is negative unless indicated above.    PHYSICAL EXAM:  General:  On exam, the patient is a confused  Female in no acute distress.  Vital Signs Last 24 Hrs  T(C): 37.1 (18 Nov 2018 05:48), Max: 37.1 (18 Nov 2018 05:48)  T(F): 98.8 (18 Nov 2018 05:48), Max: 98.8 (18 Nov 2018 05:48)  HR: 88 (18 Nov 2018 05:48) (88 - 95)  BP: 140/88 (18 Nov 2018 05:48) (138/72 - 162/84)  BP(mean): --  RR: 18 (18 Nov 2018 05:48) (16 - 18)  SpO2: 97% (18 Nov 2018 05:48) (94% - 97%)    Neck:  4+/4+ bilateral carotid pulses; no carotid bruit, no palpable cervical masses.  Heart:  Regular, no murmurs, rubs or gallops.    Lungs:  decreased BS at both bases    Chest:  No chest wall deformities  Symmetrical chest expansion.   Abdomen: Soft and nontender.  No palpable masses.  No rebound, guarding or rigidity.  Extremities:  Feet are warm, pink with normal capillary refill times.  There are no digital ulcers or heel decubiti.  The calf and thigh muscles are soft and nontender.  There are no palpable cords or limb cellulitis.  Navarro's sign  is negative bilaterally.  There is no lower extremity edema, cyanosis, or rubor.  On examination of the peripheral pulses:  Left leg femoral pulse is  4/4  , popliteal pulse is 4/4   ,PT Pulse is  3/4  , DP Pulse is 3/4   Right leg femoral pulse is  4/4  ,popliteal pulse is 4/4   , PT Pulse is 3/4   , DP Pulse is 3/4   Neurological:  There are no motor or sensory deficits in either lower extremity.                          10.4   6.95  )-----------( 202      ( 18 Nov 2018 07:44 )             30.2     11-18    146<H>  |  111<H>  |  12  ----------------------------<  101<H>  3.3<L>   |  24  |  0.88    Ca    8.4<L>      18 Nov 2018 07:44  Phos  2.7     11-18  Mg     1.9     11-18    TPro  6.6  /  Alb  2.8<L>  /  TBili  1.0  /  DBili  x   /  AST  41<H>  /  ALT  24  /  AlkPhos  83  11-18        PT/INR - ( 18 Nov 2018 07:44 )   PT: 18.4 sec;   INR: 1.59 ratio             Radiology:

## 2018-11-18 NOTE — PROGRESS NOTE ADULT - SUBJECTIVE AND OBJECTIVE BOX
Adriana Swanson [facility] P   ALLERGY nka  CONTACT Dtr Tameka Campos  Self     VITALS/LABS                           11/18/2018 afeb 83 140/70 17 95%  11/18/2018 W 6.9 Hb 10.4 Plt 202 INR 1.5 Na 146 K 3.3 CO2 24 Cr .8     GLOBAL ISSUE/BEST PRACTICE:      PROBLEM: HOB elevation:   y            PROBLEM: Stress ulcer proph:    protonix 40 911/14)                       PROBLEM: VTE prophylaxis:      lvnx 50.2 (11/14) ? Rivaroxaban 15.2 (11/17)  PROBLEM: Glycemic control:    na  PROBLEM: Nutrition:    watkins (11/14)   PROBLEM: Advanced directive: na     PROBLEM: Allergies:  na  EVENT 11/17/2018 DW fam in detail re pros and cons of doac v vka v ivcf   EVENT AGITATION 11/14/2018 Constant observation     REVIEW OF SYMPTOMS    Able to give ROS  NO     PHYSICAL EXAM    HEENT Unremarkable PERRLA atraumatic   RESP Fair air entry EXP prolonged    Harsh breath sound Resp distres mild   CARDIAC S1 S2 No S3     NO JVD    ABDOMEN SOFT BS PRESENT NOT DISTENDED No hepatosplenomegaly PEDAL EDEMA present No calf tenderness  NO rash   GENERAL Not TOXIC looking    PATIENT MANAGEMENT   11/14/2018 ADMISSION [facility] P DR LEVY TY   11/14/2018 81yo female with pmhx of dementia and htn [facility] ems s/p fall. ems reports pt tripped over someone else's walker, pt walks without assistance at baseline. pt found to be febrile on arrival 100.9. pt without complaints.   pmd: irving    Pt was found to have DVT and was started on full dose lovenox 11/14 She was also found to have patchy pl based opacity rml Case was dw IR and Dr Farah felt that ctnb should not be done but a repeat CT ch should be done in 2 m Dr Kaiser was consulted 11/17/2018 to decide re ivcf vs Xarelto and this was dw family in detail on 11/17/2018     ASSESSMENT/RECOMMENDATIONS  11/14/2018 ADMISSION [facility] P DR LEVY TY    DVT   11/18/2018 Seen by Dr Kaiser ivcf recommended but daughter Mrs Campos declined   11/17/2018 Started on Xarelto 15.2 (11/17)   11/16/2018 ROLDAN Ty and with family Pt is steady on her feet and this was a trip fall Pros and cons of doac explained to pt family Xarelto agreed upon by Dr Ty   11/14/2018 V duplex R leg DVT     ELEVATED CREAT   Monitored serially Improved     PATCHY OPACITY RML ON CT CHEST DONE 11/14  This could be a pulmonary infarct Will dw IR to see if ctnb is feasible Will need serial followup and will need PET after DC   11/16/2018 I roldan Farah He feels that instead of ctnb we should rep[eat CT Chest in 2 m Message sent to Dr Ty     PNEUMONIA   Bacterial pneumonia unlikely in absence of procalcitonin leukocytosis and fever  DCed abio (11/15/2018)   11/14 bc n 11/14 uc n     TIME SPENT Over 25 minutes aggregate care time spent on encounter; activities included   direct patient care, counseling and/or coordinating care reviewing notes, lab data/ imaging , discussion with multidisciplinary team/ patient  /family. Risks, benefits, alternatives  discussed in detail.

## 2018-11-18 NOTE — PROGRESS NOTE ADULT - SUBJECTIVE AND OBJECTIVE BOX
Patient is a 82y Female with past medical history of   increase of BUN/creatinine        presenting with   possible acute kidney injury     who reports no complaints overnight.  pt seen and examined in am nad   REVIEW OF SYSTEMS:    unable to obtain review of system  Allergies    No Known Allergies    Intolerances        MEDICATIONS  (STANDING):  docusate sodium 100 milliGRAM(s) Oral two times a day  enoxaparin Injectable 50 milliGRAM(s) SubCutaneous two times a day  mirtazapine 15 milliGRAM(s) Oral at bedtime  pantoprazole    Tablet 40 milliGRAM(s) Oral before breakfast  sertraline 25 milliGRAM(s) Oral daily  sertraline 50 milliGRAM(s) Oral daily                                                10.4   6.95  )-----------( 202      ( 18 Nov 2018 07:44 )             30.2       CBC Full  -  ( 18 Nov 2018 07:44 )  WBC Count : 6.95 K/uL  Hemoglobin : 10.4 g/dL  Hematocrit : 30.2 %  Platelet Count - Automated : 202 K/uL  Mean Cell Volume : 100.0 fl  Mean Cell Hemoglobin : 34.4 pg  Mean Cell Hemoglobin Concentration : 34.4 gm/dL  Auto Neutrophil # : x  Auto Lymphocyte # : x  Auto Monocyte # : x  Auto Eosinophil # : x  Auto Basophil # : x  Auto Neutrophil % : x  Auto Lymphocyte % : x  Auto Monocyte % : x  Auto Eosinophil % : x  Auto Basophil % : x      11-18    146<H>  |  111<H>  |  12  ----------------------------<  101<H>  3.3<L>   |  24  |  0.88    Ca    8.4<L>      18 Nov 2018 07:44  Phos  2.7     11-18  Mg     1.9     11-18    TPro  6.6  /  Alb  2.8<L>  /  TBili  1.0  /  DBili  x   /  AST  41<H>  /  ALT  24  /  AlkPhos  83  11-18      CAPILLARY BLOOD GLUCOSE          Vital Signs Last 24 Hrs  T(C): 37.1 (18 Nov 2018 13:03), Max: 37.1 (18 Nov 2018 05:48)  T(F): 98.8 (18 Nov 2018 13:03), Max: 98.8 (18 Nov 2018 05:48)  HR: 83 (18 Nov 2018 13:03) (83 - 95)  BP: 144/79 (18 Nov 2018 13:03) (138/72 - 162/84)  BP(mean): --  RR: 17 (18 Nov 2018 13:03) (16 - 18)  SpO2: 95% (18 Nov 2018 13:03) (94% - 97%)        PT/INR - ( 18 Nov 2018 07:44 )   PT: 18.4 sec;   INR: 1.59 ratio                    PHYSICAL EXAM:    Constitutional: NAD,   HEENT:  conjunctiva clear   Neck: No JVD  Respiratory: CTAB  Cardiovascular: S1 and S2  Gastrointestinal: BS+, soft, NT/ND  Extremities: No peripheral edema  Neurological: unable to obtain  Psychiatric: unable to obtain  : No Guzman  Skin: dry  Dialysis Access: Not applicable

## 2018-11-18 NOTE — PROGRESS NOTE ADULT - SUBJECTIVE AND OBJECTIVE BOX
Neurology follow up note    LYUBOV [id number]      Interval History:    Patient resting in bed     MEDICATIONS    docusate sodium 100 milliGRAM(s) Oral two times a day  haloperidol    Injectable 1 milliGRAM(s) IntraMuscular every 6 hours PRN  mirtazapine 15 milliGRAM(s) Oral at bedtime  OLANZapine Injectable 5 milliGRAM(s) IntraMuscular every 6 hours PRN  pantoprazole    Tablet 40 milliGRAM(s) Oral before breakfast  potassium chloride    Tablet ER 20 milliEquivalent(s) Oral every 2 hours  risperiDONE   Tablet 0.5 milliGRAM(s) Oral two times a day  rivaroxaban 15 milliGRAM(s) Oral two times a day  sertraline 25 milliGRAM(s) Oral daily  sertraline 50 milliGRAM(s) Oral daily      Allergies    No Known Allergies    Intolerances            Vital Signs Last 24 Hrs  T(C): 37.1 (18 Nov 2018 13:03), Max: 37.1 (18 Nov 2018 05:48)  T(F): 98.8 (18 Nov 2018 13:03), Max: 98.8 (18 Nov 2018 05:48)  HR: 83 (18 Nov 2018 13:03) (83 - 91)  BP: 144/79 (18 Nov 2018 13:03) (138/72 - 144/79)  BP(mean): --  RR: 17 (18 Nov 2018 13:03) (17 - 18)  SpO2: 95% (18 Nov 2018 13:03) (95% - 97%)      REVIEW OF SYSTEMS:  Extremely limited secondary to the patient's baseline that she says one to two words and occasionally will articulate.        PHYSICAL EXAMINATION:     HEENT:  Head:  Positive trauma was noted over the left eye.    Eyes:  No scleral icterus.    Ears:  Hearing hard to evaluate.    NECK:  Supple.    RESPIRATORY:  Decreased breath sounds bilaterally.    CARDIOVASCULAR:  S1 and S2 are heard.    ABDOMEN:  Soft and nontender.    EXTREMITIES:  No clubbing or cyanosis were noted.      NEUROLOGIC:  The patient is awake and alert.    Extraocular movements were intact.  Positive blink to bilateral visual threat.    The patient would mumble, occasionally would say one word, was off and on following commands.    Motor:  With stimuli, did move all four extremities.  No focality was noted.    Tone:  Bilateral upper and lower was increased.            LABS:  CBC Full  -  ( 18 Nov 2018 07:44 )  WBC Count : 6.95 K/uL  Hemoglobin : 10.4 g/dL  Hematocrit : 30.2 %  Platelet Count - Automated : 202 K/uL  Mean Cell Volume : 100.0 fl  Mean Cell Hemoglobin : 34.4 pg  Mean Cell Hemoglobin Concentration : 34.4 gm/dL  Auto Neutrophil # : x  Auto Lymphocyte # : x  Auto Monocyte # : x  Auto Eosinophil # : x  Auto Basophil # : x  Auto Neutrophil % : x  Auto Lymphocyte % : x  Auto Monocyte % : x  Auto Eosinophil % : x  Auto Basophil % : x      11-18    146<H>  |  111<H>  |  12  ----------------------------<  101<H>  3.3<L>   |  24  |  0.88    Ca    8.4<L>      18 Nov 2018 07:44  Phos  2.7     11-18  Mg     1.9     11-18    TPro  6.6  /  Alb  2.8<L>  /  TBili  1.0  /  DBili  x   /  AST  41<H>  /  ALT  24  /  AlkPhos  83  11-18    Hemoglobin A1C:     LIVER FUNCTIONS - ( 18 Nov 2018 07:44 )  Alb: 2.8 g/dL / Pro: 6.6 g/dL / ALK PHOS: 83 U/L / ALT: 24 U/L / AST: 41 U/L / GGT: x           Vitamin B12   PT/INR - ( 18 Nov 2018 07:44 )   PT: 18.4 sec;   INR: 1.59 ratio               RADIOLOGY      ANALYSIS AND PLAN:  An 82-year-old with an episode of fall, head trauma, and change in mental status.  1.	For episodes of fall, this appears to be a mechanical fall.  There is no clear history of epilepsy reported.  The examination was limited, but no signs to suggest a new cerebrovascular accident has ensued.  2.	For episode of head trauma, would recommend to continue neuro checks.  3.	For change in mental status, most likely secondary to dementia made worse in the hospital setting.  4.	For history of dementia, secondary to advanced symptoms, do not feel medications would be of any benefit.  The patient did try medications in the past.  5.	Spoke with the daughter, Marychuy, at [phone] yesterday  6.	Physical Therapy evaluation.  7.	Fall precautions.  8.	Greater than 45 minutes of time was spent with the patient, plan of care, reviewing data, and speaking to the family and multidisciplinary healthcare team.    Thank you for the courtesy of this consultation.

## 2018-11-18 NOTE — PROGRESS NOTE ADULT - SUBJECTIVE AND OBJECTIVE BOX
Interval History:    CENTRAL LINE:   [  ] YES       [  ] NO  WOO:                 [  ] YES       [  ] NO         REVIEW OF SYSTEMS:  All Systems below were reviewed and are negative [  ]  HEENT:  ID:  Pulmonary:  Cardiac:  GI:  Renal:  Musculoskeletal:  All other systems above were reviewed and are negative   [  ]      MEDICATIONS  (STANDING):  docusate sodium 100 milliGRAM(s) Oral two times a day  mirtazapine 15 milliGRAM(s) Oral at bedtime  pantoprazole    Tablet 40 milliGRAM(s) Oral before breakfast  risperiDONE   Tablet 0.5 milliGRAM(s) Oral two times a day  rivaroxaban 15 milliGRAM(s) Oral two times a day  sertraline 25 milliGRAM(s) Oral daily  sertraline 50 milliGRAM(s) Oral daily    MEDICATIONS  (PRN):  haloperidol    Injectable 1 milliGRAM(s) IntraMuscular every 6 hours PRN Agitation  OLANZapine Injectable 5 milliGRAM(s) IntraMuscular every 6 hours PRN Acute agitation      Vital Signs Last 24 Hrs  T(C): 37.1 (18 Nov 2018 13:03), Max: 37.1 (18 Nov 2018 05:48)  T(F): 98.8 (18 Nov 2018 13:03), Max: 98.8 (18 Nov 2018 05:48)  HR: 83 (18 Nov 2018 13:03) (83 - 91)  BP: 144/79 (18 Nov 2018 13:03) (138/72 - 144/79)  BP(mean): --  RR: 17 (18 Nov 2018 13:03) (17 - 18)  SpO2: 95% (18 Nov 2018 13:03) (95% - 97%)    I&O's Summary    18 Nov 2018 07:01  -  18 Nov 2018 20:09  --------------------------------------------------------  IN: 500 mL / OUT: 0 mL / NET: 500 mL        PHYSICAL EXAM:  HEENT: NC/AT; PERRLA  Neck: Soft; no tenderness  Lungs: CTA bilaterally; no wheezing.   Heart:  Abdomen:  Genital/ Rectal:  Extremities:  Neurologic:  Vascular:      LABORATORY:    CBC Full  -  ( 18 Nov 2018 07:44 )  WBC Count : 6.95 K/uL  Hemoglobin : 10.4 g/dL  Hematocrit : 30.2 %  Platelet Count - Automated : 202 K/uL  Mean Cell Volume : 100.0 fl  Mean Cell Hemoglobin : 34.4 pg  Mean Cell Hemoglobin Concentration : 34.4 gm/dL  Auto Neutrophil # : x  Auto Lymphocyte # : x  Auto Monocyte # : x  Auto Eosinophil # : x  Auto Basophil # : x  Auto Neutrophil % : x  Auto Lymphocyte % : x  Auto Monocyte % : x  Auto Eosinophil % : x  Auto Basophil % : x      ESR:                   11-15 @ 08:19  --    C-Reactive Protein:     11-15 @ 08:19  --    Procalcitonin:           11-15 @ 08:19   <0.05      11-18    146<H>  |  111<H>  |  12  ----------------------------<  101<H>  3.3<L>   |  24  |  0.88    Ca    8.4<L>      18 Nov 2018 07:44  Phos  2.7     11-18  Mg     1.9     11-18    TPro  6.6  /  Alb  2.8<L>  /  TBili  1.0  /  DBili  x   /  AST  41<H>  /  ALT  24  /  AlkPhos  83  11-18          Assessment and Plan:          Alfonso Ashford MD   [phone]. She is comfortable  No fevers noted.     MEDICATIONS  (STANDING):  docusate sodium 100 milliGRAM(s) Oral two times a day  mirtazapine 15 milliGRAM(s) Oral at bedtime  pantoprazole    Tablet 40 milliGRAM(s) Oral before breakfast  risperiDONE   Tablet 0.5 milliGRAM(s) Oral two times a day  rivaroxaban 15 milliGRAM(s) Oral two times a day  sertraline 25 milliGRAM(s) Oral daily  sertraline 50 milliGRAM(s) Oral daily    MEDICATIONS  (PRN):  haloperidol    Injectable 1 milliGRAM(s) IntraMuscular every 6 hours PRN Agitation  OLANZapine Injectable 5 milliGRAM(s) IntraMuscular every 6 hours PRN Acute agitation      Vital Signs Last 24 Hrs  T(C): 37.1 (18 Nov 2018 13:03), Max: 37.1 (18 Nov 2018 05:48)  T(F): 98.8 (18 Nov 2018 13:03), Max: 98.8 (18 Nov 2018 05:48)  HR: 83 (18 Nov 2018 13:03) (83 - 91)  BP: 144/79 (18 Nov 2018 13:03) (138/72 - 144/79)  BP(mean): --  RR: 17 (18 Nov 2018 13:03) (17 - 18)  SpO2: 95% (18 Nov 2018 13:03) (95% - 97%)    I&O's Summary    18 Nov 2018 07:01  -  18 Nov 2018 20:09  --------------------------------------------------------  IN: 500 mL / OUT: 0 mL / NET: 500 mL      PHYSICAL EXAM:  HEENT: NC/AT; PERRLA  Neck: Soft; no tenderness  Lungs: Decreased BS bilaterally; no wheezing.   Heart: RRR; no murmurs  Abdomen: Soft; no masses.  Extremities: No ulcers.    LABORATORY:    CBC Full  -  ( 18 Nov 2018 07:44 )  WBC Count : 6.95 K/uL  Hemoglobin : 10.4 g/dL  Hematocrit : 30.2 %  Platelet Count - Automated : 202 K/uL  Mean Cell Volume : 100.0 fl  Mean Cell Hemoglobin : 34.4 pg  Mean Cell Hemoglobin Concentration : 34.4 gm/dL  Auto Neutrophil # : x  Auto Lymphocyte # : x  Auto Monocyte # : x  Auto Eosinophil # : x  Auto Basophil # : x  Auto Neutrophil % : x  Auto Lymphocyte % : x  Auto Monocyte % : x  Auto Eosinophil % : x  Auto Basophil % : x      ESR:                   11-15 @ 08:19  --    C-Reactive Protein:     11-15 @ 08:19  --    Procalcitonin:           11-15 @ 08:19   <0.05      11-18    146<H>  |  111<H>  |  12  ----------------------------<  101<H>  3.3<L>   |  24  |  0.88    Ca    8.4<L>      18 Nov 2018 07:44  Phos  2.7     11-18  Mg     1.9     11-18    TPro  6.6  /  Alb  2.8<L>  /  TBili  1.0  /  DBili  x   /  AST  41<H>  /  ALT  24  /  AlkPhos  83  11-18    Assessment and Plan:    1. RML pleural based density.  2. RLE DVT.    . No evidence for pneumonia.   . Monitor the patient off antibiotics.  . Continue Lovenox for DVT. Follow with pulmonary.       Alfonso Ashford MD   [phone].

## 2018-11-19 LAB
ANION GAP SERPL CALC-SCNC: 12 MMOL/L — SIGNIFICANT CHANGE UP (ref 5–17)
BUN SERPL-MCNC: 13 MG/DL — SIGNIFICANT CHANGE UP (ref 7–23)
CALCIUM SERPL-MCNC: 8.9 MG/DL — SIGNIFICANT CHANGE UP (ref 8.5–10.1)
CHLORIDE SERPL-SCNC: 107 MMOL/L — SIGNIFICANT CHANGE UP (ref 96–108)
CO2 SERPL-SCNC: 23 MMOL/L — SIGNIFICANT CHANGE UP (ref 22–31)
CREAT SERPL-MCNC: 0.79 MG/DL — SIGNIFICANT CHANGE UP (ref 0.5–1.3)
CULTURE RESULTS: SIGNIFICANT CHANGE UP
CULTURE RESULTS: SIGNIFICANT CHANGE UP
GLUCOSE SERPL-MCNC: 102 MG/DL — HIGH (ref 70–99)
POTASSIUM SERPL-MCNC: 3.3 MMOL/L — LOW (ref 3.5–5.3)
POTASSIUM SERPL-SCNC: 3.3 MMOL/L — LOW (ref 3.5–5.3)
SODIUM SERPL-SCNC: 142 MMOL/L — SIGNIFICANT CHANGE UP (ref 135–145)
SPECIMEN SOURCE: SIGNIFICANT CHANGE UP
SPECIMEN SOURCE: SIGNIFICANT CHANGE UP

## 2018-11-19 RX ORDER — POTASSIUM CHLORIDE 20 MEQ
40 PACKET (EA) ORAL ONCE
Qty: 0 | Refills: 0 | Status: COMPLETED | OUTPATIENT
Start: 2018-11-19 | End: 2018-11-19

## 2018-11-19 RX ADMIN — RIVAROXABAN 15 MILLIGRAM(S): KIT at 05:13

## 2018-11-19 RX ADMIN — Medication 100 MILLIGRAM(S): at 05:13

## 2018-11-19 RX ADMIN — RISPERIDONE 0.5 MILLIGRAM(S): 4 TABLET ORAL at 05:13

## 2018-11-19 RX ADMIN — RISPERIDONE 0.5 MILLIGRAM(S): 4 TABLET ORAL at 17:24

## 2018-11-19 RX ADMIN — MIRTAZAPINE 15 MILLIGRAM(S): 45 TABLET, ORALLY DISINTEGRATING ORAL at 21:41

## 2018-11-19 RX ADMIN — PANTOPRAZOLE SODIUM 40 MILLIGRAM(S): 20 TABLET, DELAYED RELEASE ORAL at 07:37

## 2018-11-19 RX ADMIN — RIVAROXABAN 15 MILLIGRAM(S): KIT at 17:23

## 2018-11-19 RX ADMIN — SERTRALINE 25 MILLIGRAM(S): 25 TABLET, FILM COATED ORAL at 12:03

## 2018-11-19 RX ADMIN — Medication 40 MILLIEQUIVALENT(S): at 17:25

## 2018-11-19 RX ADMIN — SERTRALINE 50 MILLIGRAM(S): 25 TABLET, FILM COATED ORAL at 12:03

## 2018-11-19 NOTE — PROGRESS NOTE ADULT - SUBJECTIVE AND OBJECTIVE BOX
Neurology follow up note    LYUBOV [id number]      Interval History:    Patient resting in bed     MEDICATIONS    docusate sodium 100 milliGRAM(s) Oral two times a day  haloperidol    Injectable 1 milliGRAM(s) IntraMuscular every 6 hours PRN  mirtazapine 15 milliGRAM(s) Oral at bedtime  OLANZapine Injectable 5 milliGRAM(s) IntraMuscular every 6 hours PRN  pantoprazole    Tablet 40 milliGRAM(s) Oral before breakfast  risperiDONE   Tablet 0.5 milliGRAM(s) Oral two times a day  rivaroxaban 15 milliGRAM(s) Oral two times a day  sertraline 25 milliGRAM(s) Oral daily  sertraline 50 milliGRAM(s) Oral daily      Allergies    No Known Allergies    Intolerances            Vital Signs Last 24 Hrs  T(C): 36.6 (18 Nov 2018 21:14), Max: 37.1 (18 Nov 2018 13:03)  T(F): 97.9 (18 Nov 2018 21:14), Max: 98.8 (18 Nov 2018 13:03)  HR: 87 (18 Nov 2018 21:14) (83 - 87)  BP: 152/84 (18 Nov 2018 21:14) (144/79 - 152/84)  BP(mean): --  RR: 16 (18 Nov 2018 21:14) (16 - 17)  SpO2: 94% (18 Nov 2018 21:14) (94% - 95%)      REVIEW OF SYSTEMS:  Extremely limited secondary to the patient's baseline that she says one to two words and occasionally will articulate.    PHYSICAL EXAMINATION:     HEENT:  Head:  Positive trauma was noted over the left eye.    Eyes:  No scleral icterus.    Ears:  Hearing hard to evaluate.    NECK:  Supple.    RESPIRATORY:  Decreased breath sounds bilaterally.    CARDIOVASCULAR:  S1 and S2 are heard.    ABDOMEN:  Soft and nontender.    EXTREMITIES:  No clubbing or cyanosis were noted.      NEUROLOGIC:  The patient is awake and alert.    Extraocular movements were intact.  Positive blink to bilateral visual threat.    The patient would mumble, occasionally would say one word, was off and on following commands.    Motor:  With stimuli, did move all four extremities.  No focality was noted.    Tone:  Bilateral upper and lower was increased.                    LABS:  CBC Full  -  ( 18 Nov 2018 07:44 )  WBC Count : 6.95 K/uL  Hemoglobin : 10.4 g/dL  Hematocrit : 30.2 %  Platelet Count - Automated : 202 K/uL  Mean Cell Volume : 100.0 fl  Mean Cell Hemoglobin : 34.4 pg  Mean Cell Hemoglobin Concentration : 34.4 gm/dL  Auto Neutrophil # : x  Auto Lymphocyte # : x  Auto Monocyte # : x  Auto Eosinophil # : x  Auto Basophil # : x  Auto Neutrophil % : x  Auto Lymphocyte % : x  Auto Monocyte % : x  Auto Eosinophil % : x  Auto Basophil % : x      11-19    142  |  107  |  13  ----------------------------<  102<H>  3.3<L>   |  23  |  0.79    Ca    8.9      19 Nov 2018 08:26  Phos  2.7     11-18  Mg     1.9     11-18    TPro  6.6  /  Alb  2.8<L>  /  TBili  1.0  /  DBili  x   /  AST  41<H>  /  ALT  24  /  AlkPhos  83  11-18    Hemoglobin A1C:     LIVER FUNCTIONS - ( 18 Nov 2018 07:44 )  Alb: 2.8 g/dL / Pro: 6.6 g/dL / ALK PHOS: 83 U/L / ALT: 24 U/L / AST: 41 U/L / GGT: x           Vitamin B12   PT/INR - ( 18 Nov 2018 07:44 )   PT: 18.4 sec;   INR: 1.59 ratio               RADIOLOGY      ANALYSIS AND PLAN:  An 82-year-old with an episode of fall, head trauma, and change in mental status.  1.	For episodes of fall, this appears to be a mechanical fall.  There is no clear history of epilepsy reported.  The examination was limited, but no signs to suggest a new cerebrovascular accident has ensued.  2.	For episode of head trauma, would recommend to continue neuro checks.  3.	For change in mental status, most likely secondary to dementia made worse in the hospital setting.  4.	For history of dementia, secondary to advanced symptoms, do not feel medications would be of any benefit.  The patient did try medications in the past.  5.	Spoke with the daughter, Marychuy, at [phone] in past  6.	Physical Therapy evaluation.  7.	Fall precautions.  8.	Greater than 40 minutes of time was spent with the patient, plan of care, reviewing data, and speaking to the family and multidisciplinary healthcare team.    Thank you for the courtesy of this consultation.

## 2018-11-19 NOTE — PROGRESS NOTE ADULT - SUBJECTIVE AND OBJECTIVE BOX
Adriana Swanson [facility]   ALLERGY nka  CONTACT Dtr Tameka Campos  Self     VITALS/LABS                           11/19/2018 afeb 89 130/70 16 95%  11/19/2018 Na 142 K 3.3 CO2 23 Cr .7    11/18/2018 W 6.9 Hb 10.4 Plt 202 INR 1.5 Na 146 K 3.3 CO2 24 Cr .8     REVIEW OF SYMPTOMS    Able to give ROS  NO     PHYSICAL EXAM    HEENT Unremarkable PERRLA atraumatic   RESP Fair air entry EXP prolonged    Harsh breath sound Resp distres mild   CARDIAC S1 S2 No S3     NO JVD    ABDOMEN SOFT BS PRESENT NOT DISTENDED No hepatosplenomegaly PEDAL EDEMA present No calf tenderness  NO rash   GENERAL Not TOXIC looking    GLOBAL ISSUE/BEST PRACTICE:      PROBLEM: HOB elevation:   y            PROBLEM: Stress ulcer proph:    protonix 40 911/14)                       PROBLEM: VTE prophylaxis:      lvnx 50.2 (11/14) ? Rivaroxaban 15.2 (11/17)  PROBLEM: Glycemic control:    na  PROBLEM: Nutrition:    watkins (11/14)   PROBLEM: Advanced directive: na     PROBLEM: Allergies:  na  EVENT 11/17/2018 DW fam in detail re pros and cons of doac v vka v ivcf   EVENT AGITATION 11/14/2018 Constant observation     PATIENT MANAGEMENT   11/14/2018 ADMISSION [facility] P DR LEVY TY   11/14/2018 83yo female with pmhx of dementia and htn [facility] ems s/p fall. ems reports pt tripped over someone else's walker, pt walks without assistance at baseline. pt found to be febrile on arrival 100.9. pt without complaints.   pmd: irving    Pt was found to have DVT and was started on full dose lovenox 11/14 She was also found to have patchy pl based opacity rml Case was dw IR and Dr Farah felt that ctnb should not be done but a repeat CT ch should be done in 2 m Dr Kaiser was consulted 11/17/2018 to decide re ivcf vs Xarelto and this was dw family in detail on 11/17/2018   Pt was seen by Dr Kaiser and fam declined IVCF which was recommended     ASSESSMENT/RECOMMENDATIONS  11/14/2018 ADMISSION [facility] P DR LEVY TY    DVT   11/18/2018 Seen by Dr Job ivcf recommended but daughter Mrs Campos declined   11/17/2018 Started on Xarelto 15.2 (11/17)   11/16/2018 DW Dr Ty and with family Pt is steady on her feet and this was a trip fall Pros and cons of doac explained to pt family Xarelto agreed upon by Dr Ty   11/14/2018 V duplex R leg DVT     ELEVATED CREAT   Monitored serially Improved     PATCHY OPACITY RML ON CT CHEST DONE 11/14  This could be a pulmonary infarct Will dw IR to see if ctnb is feasible Will need serial followup and will need PET after DC   11/16/2018 I dw Dr Farah He feels that instead of ctnb we should rep[eat CT Chest in 2 m Message sent to Dr Ty     PNEUMONIA   Bacterial pneumonia unlikely in absence of procalcitonin leukocytosis and fever  DCed abio (11/15/2018)   11/14 bc n 11/14 uc n     TIME SPENT Over 25 minutes aggregate care time spent on encounter; activities included   direct patient care, counseling and/or coordinating care reviewing notes, lab data/ imaging , discussion with multidisciplinary team/ patient  /family. Risks, benefits, alternatives  discussed in detail.

## 2018-11-19 NOTE — PROGRESS NOTE ADULT - SUBJECTIVE AND OBJECTIVE BOX
Patient is a 82y Female with past medical history of   increase of BUN/creatinine        presenting with   possible acute kidney injury     who reports no complaints overnight.  pt seen and examined in am nad   REVIEW OF SYSTEMS:    unable to obtain review of system  Allergies    No Known Allergies    Intolerances        MEDICATIONS  (STANDING):  docusate sodium 100 milliGRAM(s) Oral two times a day  enoxaparin Injectable 50 milliGRAM(s) SubCutaneous two times a day  mirtazapine 15 milliGRAM(s) Oral at bedtime  pantoprazole    Tablet 40 milliGRAM(s) Oral before breakfast  sertraline 25 milliGRAM(s) Oral daily  sertraline 50 milliGRAM(s) Oral daily                                                              10.4   6.95  )-----------( 202      ( 18 Nov 2018 07:44 )             30.2       CBC Full  -  ( 18 Nov 2018 07:44 )  WBC Count : 6.95 K/uL  Hemoglobin : 10.4 g/dL  Hematocrit : 30.2 %  Platelet Count - Automated : 202 K/uL  Mean Cell Volume : 100.0 fl  Mean Cell Hemoglobin : 34.4 pg  Mean Cell Hemoglobin Concentration : 34.4 gm/dL  Auto Neutrophil # : x  Auto Lymphocyte # : x  Auto Monocyte # : x  Auto Eosinophil # : x  Auto Basophil # : x  Auto Neutrophil % : x  Auto Lymphocyte % : x  Auto Monocyte % : x  Auto Eosinophil % : x  Auto Basophil % : x      11-19    142  |  107  |  13  ----------------------------<  102<H>  3.3<L>   |  23  |  0.79    Ca    8.9      19 Nov 2018 08:26  Phos  2.7     11-18  Mg     1.9     11-18    TPro  6.6  /  Alb  2.8<L>  /  TBili  1.0  /  DBili  x   /  AST  41<H>  /  ALT  24  /  AlkPhos  83  11-18      CAPILLARY BLOOD GLUCOSE          Vital Signs Last 24 Hrs  T(C): 36.6 (18 Nov 2018 21:14), Max: 36.6 (18 Nov 2018 21:14)  T(F): 97.9 (18 Nov 2018 21:14), Max: 97.9 (18 Nov 2018 21:14)  HR: 87 (18 Nov 2018 21:14) (87 - 87)  BP: 152/84 (18 Nov 2018 21:14) (152/84 - 152/84)  BP(mean): --  RR: 16 (18 Nov 2018 21:14) (16 - 16)  SpO2: 94% (18 Nov 2018 21:14) (94% - 94%)        PT/INR - ( 18 Nov 2018 07:44 )   PT: 18.4 sec;   INR: 1.59 ratio                       PHYSICAL EXAM:    Constitutional: NAD,   HEENT:  conjunctiva clear   Neck: No JVD  Respiratory: CTAB  Cardiovascular: S1 and S2  Gastrointestinal: BS+, soft, NT/ND  Extremities: No peripheral edema  Neurological: unable to obtain  Psychiatric: unable to obtain  : No Guzman  Skin: dry  Dialysis Access: Not applicable

## 2018-11-19 NOTE — PROGRESS NOTE ADULT - PROBLEM SELECTOR PLAN 2
f/u blood and urine cx,serial lactate levels,monitor vitals closley,ivfs hydration,monitor urine output and renal profile,iv abx prn

## 2018-11-19 NOTE — PROGRESS NOTE ADULT - SUBJECTIVE AND OBJECTIVE BOX
Patient is a 82y Female with a known history of :  Delirium (R41.0)  ANNY (acute kidney injury) (N17.9)  Prophylactic measure (Z29.9)  Anxiety (F41.9)  Dementia (F03.90)  Depression (F32.9)  HTN (hypertension) (I10)  PNA (pneumonia) (J18.9)  DVT (deep venous thrombosis) (I82.409)  Fall (W19.XXXA)  Suspected deep vein thrombosis ([id number])    HPI:  pt is a 81yo female with pmhx of dementia and htn [facility] ems s/p fall. ems reports pt tripped over someone else's walker, pt walks without assistance at baseline. pt found to be febrile on arrival 100.9 Admitted for septic workup and evaluation,send blood and urine cx,serial lactate levels,monitor vitals closley,ivfs hydration,monitor urine output and renal profile,iv abx initiated  pt without complaints. Daughter is at bedside and states patient has a steady gait Found to have DVT and started on full dose AC (14 Nov 2018 21:23)      REVIEW OF SYSTEMS:    CONSTITUTIONAL: No fever, weight loss, or fatigue  EYES: No eye pain, visual disturbances, or discharge  ENMT:  No difficulty hearing, tinnitus, vertigo; No sinus or throat pain  NECK: No pain or stiffness  BREASTS: No pain, masses, or nipple discharge  RESPIRATORY: No cough, wheezing, chills or hemoptysis; No shortness of breath  CARDIOVASCULAR: No chest pain, palpitations, dizziness, or leg swelling  GASTROINTESTINAL: No abdominal or epigastric pain. No nausea, vomiting, or hematemesis; No diarrhea or constipation. No melena or hematochezia.  GENITOURINARY: No dysuria, frequency, hematuria, or incontinence  NEUROLOGICAL: No headaches, memory loss, loss of strength, numbness, or tremors  SKIN: No itching, burning, rashes, or lesions   LYMPH NODES: No enlarged glands  ENDOCRINE: No heat or cold intolerance; No hair loss  MUSCULOSKELETAL: No joint pain or swelling; No muscle, back, or extremity pain  PSYCHIATRIC: No depression, anxiety, mood swings, or difficulty sleeping  HEME/LYMPH: No easy bruising, or bleeding gums  ALLERGY AND IMMUNOLOGIC: No hives or eczema    MEDICATIONS  (STANDING):  docusate sodium 100 milliGRAM(s) Oral two times a day  mirtazapine 15 milliGRAM(s) Oral at bedtime  pantoprazole    Tablet 40 milliGRAM(s) Oral before breakfast  risperiDONE   Tablet 0.5 milliGRAM(s) Oral two times a day  rivaroxaban 15 milliGRAM(s) Oral two times a day  sertraline 25 milliGRAM(s) Oral daily  sertraline 50 milliGRAM(s) Oral daily    MEDICATIONS  (PRN):  haloperidol    Injectable 1 milliGRAM(s) IntraMuscular every 6 hours PRN Agitation  OLANZapine Injectable 5 milliGRAM(s) IntraMuscular every 6 hours PRN Acute agitation      ALLERGIES: No Known Allergies      FAMILY HISTORY:  No pertinent family history in first degree relatives      PHYSICAL EXAMINATION:  -----------------------------  T(C): 36.6 (11-18-18 @ 21:14), Max: 37.1 (11-18-18 @ 13:03)  HR: 87 (11-18-18 @ 21:14) (83 - 87)  BP: 152/84 (11-18-18 @ 21:14) (144/79 - 152/84)  RR: 16 (11-18-18 @ 21:14) (16 - 17)  SpO2: 94% (11-18-18 @ 21:14) (94% - 95%)  Wt(kg): --    11-18 @ 07:01  -  11-19 @ 07:00  --------------------------------------------------------  IN:    Oral Fluid: 500 mL  Total IN: 500 mL    OUT:  Total OUT: 0 mL    Total NET: 500 mL            Constitutional: well developed, normal appearance, well groomed, well nourished, no deformities and no acute distress.   Eyes: the conjunctiva exhibited no abnormalities and the eyelids demonstrated no xanthelasmas.   HEENT: normal oral mucosa, no oral pallor and no oral cyanosis.   Neck: normal jugular venous A waves present, normal jugular venous V waves present and no jugular venous lao A waves.   Pulmonary: no respiratory distress, normal respiratory rhythm and effort, no accessory muscle use and lungs were clear to auscultation bilaterally.   Cardiovascular: heart rate and rhythm were normal, normal S1 and S2 and no murmur, gallop, rub, heave or thrill are present.   Abdomen: soft, non-tender, no hepato-splenomegaly and no abdominal mass palpated.   Musculoskeletal: the gait could not be assessed..   Extremities: no clubbing of the fingernails, no localized cyanosis, no petechial hemorrhages and no ischemic changes.   Skin: normal skin color and pigmentation, no rash, no venous stasis, no skin lesions, no skin ulcer and no xanthoma was observed.   Psychiatric: oriented to person, place, and time, the affect was normal, the mood was normal and not feeling anxious.     LABS:   --------  11-18    146<H>  |  111<H>  |  12  ----------------------------<  101<H>  3.3<L>   |  24  |  0.88    Ca    8.4<L>      18 Nov 2018 07:44  Phos  2.7     11-18  Mg     1.9     11-18    TPro  6.6  /  Alb  2.8<L>  /  TBili  1.0  /  DBili  x   /  AST  41<H>  /  ALT  24  /  AlkPhos  83  11-18                         10.4   6.95  )-----------( 202      ( 18 Nov 2018 07:44 )             30.2     PT/INR - ( 18 Nov 2018 07:44 )   PT: 18.4 sec;   INR: 1.59 ratio                     RADIOLOGY:  -----------------        ECG:

## 2018-11-19 NOTE — PROGRESS NOTE ADULT - SUBJECTIVE AND OBJECTIVE BOX
PROGRESS NOTE  Patient is a 82y old  Female who presents with a chief complaint of pt is a 81yo female with pmhx of dementia and htn [facility] ems s/p fall. ems reports pt tripped over someone else's walker, pt walks without assistance at baseline. pt found to be febrile on arrival 100.9 Admitted for septic workup and evaluation,send blood and urine cx,serial lactate levels,monitor vitals closley,ivfs hydration,monitor urine output and renal profile,iv abx initiated  pt without complaints. Daughter is at bedside and states patient has a steady gait Found to have DVT and started on full dose AC (19 Nov 2018 16:14)  Chart and available morning labs /imaging are reviewed electronically , urgent issues addressed . More information  is being added upon completion of rounds , when more information is collected and management discussed with consultants , medical staff and social service/case management on the floor   OVERNIGHT  Remains on constant observation , periods of agitation and delirium .Psychiatry followup is requested   HPI:  pt is a 81yo female with pmhx of dementia and htn [facility] ems s/p fall. ems reports pt tripped over someone else's walker, pt walks without assistance at baseline. pt found to be febrile on arrival 100.9 Admitted for septic workup and evaluation,send blood and urine cx,serial lactate levels,monitor vitals closley,ivfs hydration,monitor urine output and renal profile,iv abx initiated  pt without complaints. Daughter is at bedside and states patient has a steady gait Found to have DVT and started on full dose AC (14 Nov 2018 21:23)    PAST MEDICAL & SURGICAL HISTORY:  Anxiety  HTN (hypertension)  Depression  Dementia  No significant past surgical history      MEDICATIONS  (STANDING):  docusate sodium 100 milliGRAM(s) Oral two times a day  mirtazapine 15 milliGRAM(s) Oral at bedtime  pantoprazole    Tablet 40 milliGRAM(s) Oral before breakfast  risperiDONE   Tablet 0.5 milliGRAM(s) Oral two times a day  rivaroxaban 15 milliGRAM(s) Oral two times a day  sertraline 25 milliGRAM(s) Oral daily  sertraline 50 milliGRAM(s) Oral daily    MEDICATIONS  (PRN):  haloperidol    Injectable 1 milliGRAM(s) IntraMuscular every 6 hours PRN Agitation  OLANZapine Injectable 5 milliGRAM(s) IntraMuscular every 6 hours PRN Acute agitation      OBJECTIVE    T(C): 36.7 (11-19-18 @ 14:01), Max: 36.7 (11-19-18 @ 14:01)  HR: 89 (11-19-18 @ 14:01) (87 - 89)  BP: 132/72 (11-19-18 @ 14:01) (132/72 - 152/84)  RR: 16 (11-19-18 @ 14:01) (16 - 16)  SpO2: 95% (11-19-18 @ 14:01) (94% - 95%)  Wt(kg): --  I&O's Summary    18 Nov 2018 07:01  -  19 Nov 2018 07:00  --------------------------------------------------------  IN: 500 mL / OUT: 0 mL / NET: 500 mL    19 Nov 2018 07:01  -  19 Nov 2018 18:10  --------------------------------------------------------  IN: 240 mL / OUT: 0 mL / NET: 240 mL  Patient remains on constant observation   REVIEW OF SYSTEMS:  ROS is unobtainable due to lethargy and confusion   PHYSICAL EXAM: l cheek hematoma   Constitutional: NAD,   HEENT:  conjunctiva clear   Neck: No JVD  Respiratory: CTAB  Cardiovascular: S1 and S2  Gastrointestinal: BS+, soft, NT/ND  Extremities: No peripheral edema  Neurological: unable to obtain  Psychiatric: unable to obtain  : No Guzman  Skin: dry  LABS:                      10.4   6.95  )-----------( 202      ( 18 Nov 2018 07:44 )             30.2     11-19    142  |  107  |  13  ----------------------------<  102<H>  3.3<L>   |  23  |  0.79    Ca    8.9      19 Nov 2018 08:26  Phos  2.7     11-18  Mg     1.9     11-18    TPro  6.6  /  Alb  2.8<L>  /  TBili  1.0  /  DBili  x   /  AST  41<H>  /  ALT  24  /  AlkPhos  83  11-18    CAPILLARY BLOOD GLUCOSE        PT/INR - ( 18 Nov 2018 07:44 )   PT: 18.4 sec;   INR: 1.59 ratio               Culture - Blood (collected 14 Nov 2018 22:10)  Source: .Blood Blood-Peripheral  Preliminary Report (15 Nov 2018 23:02):    No growth to date.    Culture - Blood (collected 14 Nov 2018 22:10)  Source: .Blood Blood-Peripheral  Preliminary Report (15 Nov 2018 23:02):    No growth to date.  Culture - Urine (collected 14 Nov 2018 21:51)  Source: .Urine Catheterized  Final Report (15 Nov 2018 22:04):    No growth  RADIOLOGY & ADDITIONAL TESTS:< from: Xray Chest 1 View- PORTABLE-Routine (11.14.18 @ 19:45) >  EXAM:  XR CHEST PORTABLE ROUTINE 1V                        PROCEDURE DATE:  11/14/2018    INTERPRETATION:  Clinical information: Fall, pain  Portable study of the chest, 7:49 PM  No prior chest x-rays present for comparison  Mildly elevated right hemidiaphragm. right hemidiaphragm.  The aorta shows atherosclerotic changes. The lungs are clear. There is no   sign of infiltrate effusion or congestive failure. Heart size is within   normal limits. Hilar regions and mediastinal contours are unremarkable.  The bony thorax appears intact.  IMPRESSION:  No active disease.  < end of copied text >   reviewed elctronically  ASSESSMENT/PLAN:

## 2018-11-19 NOTE — PROGRESS NOTE ADULT - PROBLEM SELECTOR PLAN 2
Increased confusion and delirious state reported this morning and constant observation renewed .Patient was trying to ambulate with assistance but was found to be  unsteady today ,she was trying to get out of the bed .She was independent walker before admission as per daughters and no falls in a past reported ( except this admission associated with mechanical trip) PT reeval requested ,may require rehab ,also may need xarelto to be substituted with lovenox ( if goes to [facility] ) or IVC filter may be an option if gait remains unsteady .case was d/w Dr Khalil ,spoke to daughter Tameka on a phone

## 2018-11-20 PROCEDURE — 99232 SBSQ HOSP IP/OBS MODERATE 35: CPT

## 2018-11-20 RX ORDER — RISPERIDONE 4 MG/1
1 TABLET ORAL
Qty: 0 | Refills: 0 | Status: DISCONTINUED | OUTPATIENT
Start: 2018-11-20 | End: 2018-11-23

## 2018-11-20 RX ORDER — POTASSIUM CHLORIDE 20 MEQ
20 PACKET (EA) ORAL ONCE
Qty: 0 | Refills: 0 | Status: COMPLETED | OUTPATIENT
Start: 2018-11-20 | End: 2018-11-20

## 2018-11-20 RX ADMIN — Medication 100 MILLIGRAM(S): at 05:39

## 2018-11-20 RX ADMIN — MIRTAZAPINE 15 MILLIGRAM(S): 45 TABLET, ORALLY DISINTEGRATING ORAL at 21:42

## 2018-11-20 RX ADMIN — SERTRALINE 50 MILLIGRAM(S): 25 TABLET, FILM COATED ORAL at 14:31

## 2018-11-20 RX ADMIN — RIVAROXABAN 15 MILLIGRAM(S): KIT at 05:39

## 2018-11-20 RX ADMIN — SERTRALINE 25 MILLIGRAM(S): 25 TABLET, FILM COATED ORAL at 14:32

## 2018-11-20 RX ADMIN — RISPERIDONE 1 MILLIGRAM(S): 4 TABLET ORAL at 17:38

## 2018-11-20 RX ADMIN — RIVAROXABAN 15 MILLIGRAM(S): KIT at 17:38

## 2018-11-20 RX ADMIN — Medication 20 MILLIEQUIVALENT(S): at 14:33

## 2018-11-20 RX ADMIN — PANTOPRAZOLE SODIUM 40 MILLIGRAM(S): 20 TABLET, DELAYED RELEASE ORAL at 05:39

## 2018-11-20 RX ADMIN — RISPERIDONE 0.5 MILLIGRAM(S): 4 TABLET ORAL at 05:39

## 2018-11-20 NOTE — PROGRESS NOTE ADULT - SUBJECTIVE AND OBJECTIVE BOX
PROGRESS NOTE  Patient is a 82y old  Female who presents with a chief complaint of pt is a 81yo female with pmhx of dementia and htn [facility] ems s/p fall. ems reports pt tripped over someone else's walker, pt walks without assistance at baseline. pt found to be febrile on arrival 100.9 Admitted for septic workup and evaluation,send blood and urine cx,serial lactate levels,monitor vitals closley,ivfs hydration,monitor urine output and renal profile,iv abx initiated  pt without complaints. Daughter is at bedside and states patient has a steady gait Found to have DVT and started on full dose AC (20 Nov 2018 07:18)    Chart and available morning labs /imaging are reviewed electronically , urgent issues addressed . More information  is being added upon completion of rounds , when more information is collected and management discussed with consultants , medical staff and social service/case management on the floor   OVERNIGHT  No new issues reported by medical staff . All above noted Patient is resting in a bed comfortably .Confused ,poor mentation .No distress noted     HPI:  pt is a 81yo female with pmhx of dementia and htn [facility] ems s/p fall. ems reports pt tripped over someone else's walker, pt walks without assistance at baseline. pt found to be febrile on arrival 100.9 Admitted for septic workup and evaluation,send blood and urine cx,serial lactate levels,monitor vitals closley,ivfs hydration,monitor urine output and renal profile,iv abx initiated  pt without complaints. Daughter is at bedside and states patient has a steady gait Found to have DVT and started on full dose AC (14 Nov 2018 21:23)    PAST MEDICAL & SURGICAL HISTORY:  Anxiety  HTN (hypertension)  Depression  Dementia  No significant past surgical history      MEDICATIONS  (STANDING):  docusate sodium 100 milliGRAM(s) Oral two times a day  mirtazapine 15 milliGRAM(s) Oral at bedtime  pantoprazole    Tablet 40 milliGRAM(s) Oral before breakfast  risperiDONE   Tablet 0.5 milliGRAM(s) Oral two times a day  rivaroxaban 15 milliGRAM(s) Oral two times a day  sertraline 25 milliGRAM(s) Oral daily  sertraline 50 milliGRAM(s) Oral daily    MEDICATIONS  (PRN):  haloperidol    Injectable 1 milliGRAM(s) IntraMuscular every 6 hours PRN Agitation  OLANZapine Injectable 5 milliGRAM(s) IntraMuscular every 6 hours PRN Acute agitation      OBJECTIVE    T(C): 36.7 (11-19-18 @ 21:32), Max: 36.7 (11-19-18 @ 14:01)  HR: 101 (11-19-18 @ 21:32) (89 - 101)  BP: 135/83 (11-19-18 @ 21:32) (132/72 - 135/83)  RR: 97 (11-19-18 @ 21:32) (16 - 97)  SpO2: 96% (11-19-18 @ 21:32) (95% - 96%)  Wt(kg): --  I&O's Summary    19 Nov 2018 07:01  -  20 Nov 2018 07:00  --------------------------------------------------------  IN: 360 mL / OUT: 0 mL / NET: 360 mL          Patient remains on constant observation   REVIEW OF SYSTEMS:  ROS is unobtainable due to lethargy and confusion   PHYSICAL EXAM: l cheek hematoma   Constitutional: NAD,   HEENT:  conjunctiva clear   Neck: No JVD  Respiratory: CTAB  Cardiovascular: S1 and S2  Gastrointestinal: BS+, soft, NT/ND  Extremities: No peripheral edema  Neurological: unable to obtain  Psychiatric: unable to obtain  : No Guzman  Skin: dry  LABS:    11-19    142  |  107  |  13  ----------------------------<  102<H>  3.3<L>   |  23  |  0.79    Ca    8.9      19 Nov 2018 08:26      CAPILLARY BLOOD GLUCOSE              Culture - Blood (collected 14 Nov 2018 22:10)  Source: .Blood Blood-Peripheral  Final Report (19 Nov 2018 23:01):    No growth at 5 days.    Culture - Blood (collected 14 Nov 2018 22:10)  Source: .Blood Blood-Peripheral  Final Report (19 Nov 2018 23:01):    No growth at 5 days.    Culture - Urine (collected 14 Nov 2018 21:51)  Source: .Urine Catheterized  Final Report (15 Nov 2018 22:04):    No growth      RADIOLOGY & ADDITIONAL TESTS:< from: Xray Chest 1 View- PORTABLE-Routine (11.14.18 @ 19:45) >  EXAM:  XR CHEST PORTABLE ROUTINE 1V                            PROCEDURE DATE:  11/14/2018          INTERPRETATION:  Clinical information: Fall, pain    Portable study of the chest, 7:49 PM    No prior chest x-rays present for comparison  Mildly elevated right hemidiaphragm. right hemidiaphragm.  The aorta shows atherosclerotic changes. The lungs are clear. There is no   sign of infiltrate effusion or congestive failure. Heart size is within   normal limits. Hilar regions and mediastinal contours are unremarkable.    The bony thorax appears intact.    IMPRESSION:    No active disease.    < end of copied text >     reviewed elctronically  ASSESSMENT/PLAN:

## 2018-11-20 NOTE — PROGRESS NOTE ADULT - SUBJECTIVE AND OBJECTIVE BOX
82yFemale admitted to med/surg with following history:  HPI:  pt is a 81yo female with pmhx of dementia and htn [facility] ems s/p fall. ems reports pt tripped over someone else's walker, pt walks without assistance at baseline. pt found to be febrile on arrival 100.9 Admitted for septic workup and evaluation,send blood and urine cx,serial lactate levels,monitor vitals closley,ivfs hydration,monitor urine output and renal profile,iv abx initiated  pt without complaints. Daughter is at bedside and states patient has a steady gait Found to have DVT and started on full dose AC (14 Nov 2018 21:23)      Psych HPI: Patient continues to be somewhat confused, although there is no evidence of acute agitation, or psychosis. Patient is lethargic at this time and is unable to engage.    PAST MEDICAL & SURGICAL HISTORY:  Anxiety  HTN (hypertension)  Depression  Dementia  No significant past surgical history      Allergies    No Known Allergies    Intolerances      MEDICATIONS  (STANDING):  docusate sodium 100 milliGRAM(s) Oral two times a day  mirtazapine 15 milliGRAM(s) Oral at bedtime  pantoprazole    Tablet 40 milliGRAM(s) Oral before breakfast  risperiDONE   Tablet 1 milliGRAM(s) Oral two times a day  rivaroxaban 15 milliGRAM(s) Oral two times a day  sertraline 25 milliGRAM(s) Oral daily  sertraline 50 milliGRAM(s) Oral daily    MEDICATIONS  (PRN):  LORazepam    Concentrate 0.5 milliGRAM(s) SubLingual every 8 hours PRN Anxiety  OLANZapine Injectable 5 milliGRAM(s) IntraMuscular every 6 hours PRN Acute agitation        ROS: Psych: See HPI.  All other systems negative.    19 Nov 2018 08:26    142    |  107    |  13     ----------------------------<  102    3.3     |  23     |  0.79     Ca    8.9        19 Nov 2018 08:26      TSH:     Utox:  Imaging:  Other Tests:    Old Records reviewed:    EXAM:  Vital Signs Last 24 Hrs  T(C): 36.7 (11-19-18 @ 21:32), Max: 36.7 (11-19-18 @ 14:01)  T(F): 98 (11-19-18 @ 21:32), Max: 98 (11-19-18 @ 14:01)  HR: 101 (11-19-18 @ 21:32) (89 - 101)  BP: 135/83 (11-19-18 @ 21:32) (132/72 - 135/83)  BP(mean): --  RR: 97 (11-19-18 @ 21:32) (16 - 97)  SpO2: 96% (11-19-18 @ 21:32) (95% - 96%)  Gen Appearance: Patient is lethargic and confused  Gait/Station/Muscle Tone: Unable to assess  MSE limited due to lethargy  Insight: poor  Judgment: poor    DX: Delirium vs dementia    REC: Increase Risperdal to 1 mg po bid  Observation - enhanced supervision

## 2018-11-20 NOTE — PROGRESS NOTE ADULT - SUBJECTIVE AND OBJECTIVE BOX
Patient is a 82y Female with past medical history of   increase of BUN/creatinine        presenting with   possible acute kidney injury     who reports no complaints overnight.  pt seen and examined in am nad     Allergies    No Known Allergies    Intolerances        MEDICATIONS  (STANDING):  docusate sodium 100 milliGRAM(s) Oral two times a day  enoxaparin Injectable 50 milliGRAM(s) SubCutaneous two times a day  mirtazapine 15 milliGRAM(s) Oral at bedtime  pantoprazole    Tablet 40 milliGRAM(s) Oral before breakfast  sertraline 25 milliGRAM(s) Oral daily  sertraline 50 milliGRAM(s) Oral daily                                                                        11-19    142  |  107  |  13  ----------------------------<  102<H>  3.3<L>   |  23  |  0.79    Ca    8.9      19 Nov 2018 08:26        CAPILLARY BLOOD GLUCOSE          Vital Signs Last 24 Hrs  T(C): 36.7 (19 Nov 2018 21:32), Max: 36.7 (19 Nov 2018 14:01)  T(F): 98 (19 Nov 2018 21:32), Max: 98 (19 Nov 2018 14:01)  HR: 101 (19 Nov 2018 21:32) (89 - 101)  BP: 135/83 (19 Nov 2018 21:32) (132/72 - 135/83)  BP(mean): --  RR: 97 (19 Nov 2018 21:32) (16 - 97)  SpO2: 96% (19 Nov 2018 21:32) (95% - 96%)                   PHYSICAL EXAM:    Constitutional: NAD,   HEENT:  conjunctiva clear   Neck: No JVD  Respiratory: CTAB  Cardiovascular: S1 and S2  Gastrointestinal: BS+, soft, NT/ND  Extremities: No peripheral edema

## 2018-11-20 NOTE — PROGRESS NOTE ADULT - SUBJECTIVE AND OBJECTIVE BOX
Patient is a 82y Female with a known history of :  Delirium (R41.0)  ANNY (acute kidney injury) (N17.9)  Prophylactic measure (Z29.9)  Anxiety (F41.9)  Dementia (F03.90)  Depression (F32.9)  HTN (hypertension) (I10)  PNA (pneumonia) (J18.9)  DVT (deep venous thrombosis) (I82.409)  Fall (W19.XXXA)  Suspected deep vein thrombosis ([id number])    HPI:  pt is a 83yo female with pmhx of dementia and htn [facility] ems s/p fall. ems reports pt tripped over someone else's walker, pt walks without assistance at baseline. pt found to be febrile on arrival 100.9 Admitted for septic workup and evaluation,send blood and urine cx,serial lactate levels,monitor vitals closley,ivfs hydration,monitor urine output and renal profile,iv abx initiated  pt without complaints. Daughter is at bedside and states patient has a steady gait Found to have DVT and started on full dose AC (14 Nov 2018 21:23)      REVIEW OF SYSTEMS:    CONSTITUTIONAL: No fever, weight loss, or fatigue  EYES: No eye pain, visual disturbances, or discharge  ENMT:  No difficulty hearing, tinnitus, vertigo; No sinus or throat pain  NECK: No pain or stiffness  BREASTS: No pain, masses, or nipple discharge  RESPIRATORY: No cough, wheezing, chills or hemoptysis; No shortness of breath  CARDIOVASCULAR: No chest pain, palpitations, dizziness, or leg swelling  GASTROINTESTINAL: No abdominal or epigastric pain. No nausea, vomiting, or hematemesis; No diarrhea or constipation. No melena or hematochezia.  GENITOURINARY: No dysuria, frequency, hematuria, or incontinence  NEUROLOGICAL: No headaches, memory loss, loss of strength, numbness, or tremors  SKIN: No itching, burning, rashes, or lesions   LYMPH NODES: No enlarged glands  ENDOCRINE: No heat or cold intolerance; No hair loss  MUSCULOSKELETAL: No joint pain or swelling; No muscle, back, or extremity pain  PSYCHIATRIC: No depression, anxiety, mood swings, or difficulty sleeping  HEME/LYMPH: No easy bruising, or bleeding gums  ALLERGY AND IMMUNOLOGIC: No hives or eczema    MEDICATIONS  (STANDING):  docusate sodium 100 milliGRAM(s) Oral two times a day  mirtazapine 15 milliGRAM(s) Oral at bedtime  pantoprazole    Tablet 40 milliGRAM(s) Oral before breakfast  risperiDONE   Tablet 0.5 milliGRAM(s) Oral two times a day  rivaroxaban 15 milliGRAM(s) Oral two times a day  sertraline 25 milliGRAM(s) Oral daily  sertraline 50 milliGRAM(s) Oral daily    MEDICATIONS  (PRN):  haloperidol    Injectable 1 milliGRAM(s) IntraMuscular every 6 hours PRN Agitation  OLANZapine Injectable 5 milliGRAM(s) IntraMuscular every 6 hours PRN Acute agitation      ALLERGIES: No Known Allergies      FAMILY HISTORY:  No pertinent family history in first degree relatives      PHYSICAL EXAMINATION:  -----------------------------  T(C): 36.7 (11-19-18 @ 21:32), Max: 36.7 (11-19-18 @ 14:01)  HR: 101 (11-19-18 @ 21:32) (89 - 101)  BP: 135/83 (11-19-18 @ 21:32) (132/72 - 135/83)  RR: 97 (11-19-18 @ 21:32) (16 - 97)  SpO2: 96% (11-19-18 @ 21:32) (95% - 96%)  Wt(kg): --    11-19 @ 07:01  -  11-20 @ 07:00  --------------------------------------------------------  IN:    Oral Fluid: 360 mL  Total IN: 360 mL    OUT:  Total OUT: 0 mL    Total NET: 360 mL            Constitutional: well developed, normal appearance, well groomed, well nourished, no deformities and no acute distress.   Eyes: the conjunctiva exhibited no abnormalities and the eyelids demonstrated no xanthelasmas.   HEENT: normal oral mucosa, no oral pallor and no oral cyanosis.   Neck: normal jugular venous A waves present, normal jugular venous V waves present and no jugular venous lao A waves.   Pulmonary: no respiratory distress, normal respiratory rhythm and effort, no accessory muscle use and lungs were clear to auscultation bilaterally.   Cardiovascular: heart rate and rhythm were normal, normal S1 and S2 and no murmur, gallop, rub, heave or thrill are present.   Abdomen: soft, non-tender, no hepato-splenomegaly and no abdominal mass palpated.   Musculoskeletal: the gait could not be assessed..   Extremities: no clubbing of the fingernails, no localized cyanosis, no petechial hemorrhages and no ischemic changes.   Skin: normal skin color and pigmentation, no rash, no venous stasis, no skin lesions, no skin ulcer and no xanthoma was observed.   Psychiatric: oriented to person, place, and time, the affect was normal, the mood was normal and not feeling anxious.     LABS:   --------  11-19    142  |  107  |  13  ----------------------------<  102<H>  3.3<L>   |  23  |  0.79    Ca    8.9      19 Nov 2018 08:26  Phos  2.7     11-18  Mg     1.9     11-18    TPro  6.6  /  Alb  2.8<L>  /  TBili  1.0  /  DBili  x   /  AST  41<H>  /  ALT  24  /  AlkPhos  83  11-18                         10.4   6.95  )-----------( 202      ( 18 Nov 2018 07:44 )             30.2     PT/INR - ( 18 Nov 2018 07:44 )   PT: 18.4 sec;   INR: 1.59 ratio                     RADIOLOGY:  -----------------        ECG:

## 2018-11-20 NOTE — PROGRESS NOTE ADULT - PROBLEM SELECTOR PLAN 2
Increased confusion and delirious state reported this morning and constant observation renewed .Patient was trying to ambulate with assistance but was found to be  unsteady today ,she was trying to get out of the bed .She was independent walker before admission as per daughters and no falls in a past reported ( except this admission associated with mechanical trip) PT reeval requested ,may require rehab ,also may need xarelto to be substituted with lovenox ( if goes to [facility] ) or IVC filter may be an option if gait remains unsteady .case was d/w Dr Khalil ,spoke to daughter Tameka on a phone Seen by vascular surgeon - Patient  has a indication for a filter however her daughter (Mrs. Campos ) does not want any surgical procedures and wants her treated with AC therapy. She is aware of the risks of both surgical and nonsurgical interventions. She is requesting Hospice care Hospice evaluation requested due to poor prognosis .Case discussed with palliative care representative,comfort care and palliative measures seem to be appropriate level of care at this point

## 2018-11-20 NOTE — PROGRESS NOTE ADULT - SUBJECTIVE AND OBJECTIVE BOX
Patient examined Detailed note to follow in the space below after more data is  gathered and processed Meanwhile continue management as recommended in my note from yesterday Patient examined Detailed note to follow in the space below after more data is  gathered and processed Meanwhile continue management as recommended in my note from yesterday        Adriana Swanson [facility]   ALLERGY nka  CONTACT Dtr Tameka Campos  Self   REASON FOR VISIT Subsequent Pulmonary followup  Initial evaluation/Pulmonary consultation requested 11/14/2018 by Dr Olivares from Dr Khalil   Patient examined chart reviewed  HOSPITAL ADMISSION [facility] 11/14/2018   PATIENT CAME  FROM (if information available)      VITALS/LABS                           11/20/2018 afeb 75 160/88 18 96%  11/19/2018 Na 142 K 3.3 CO2 23 Cr .7    11/18/2018 W 6.9 Hb 10.4 Plt 202 INR 1.5 Na 146 K 3.3 CO2 24 Cr .8     REVIEW OF SYMPTOMS    Able to give ROS  NO     PHYSICAL EXAM    HEENT Unremarkable PERRLA atraumatic   RESP Fair air entry EXP prolonged    Harsh breath sound Resp distres mild   CARDIAC S1 S2 No S3     NO JVD    ABDOMEN SOFT BS PRESENT NOT DISTENDED No hepatosplenomegaly PEDAL EDEMA present No calf tenderness  NO rash   GENERAL Not TOXIC looking    PATIENT MANAGEMENT   11/14/2018 ADMISSION [facility] DR LEVY TY   11/14/2018 81yo female with pmhx of dementia and htn [facility] ems s/p fall. ems reports pt tripped over someone else's walker, pt walks without assistance at baseline. pt found to be febrile on arrival 100.9. pt without complaints.   pmd: irving    Pt was found to have DVT and was started on full dose lovenox 11/14 She was also found to have patchy pl based opacity rml Case was dw IR and Dr Farah felt that ctnb should not be done but a repeat CT ch should be done in 2 m Dr Kaiser was consulted 11/17/2018 to decide re ivcf vs Xarelto and this was dw family in detail on 11/17/2018   Pt was seen by Dr Kaiser and fam declined IVCF which was recommended     ASSESSMENT/RECOMMENDATIONS  11/14/2018 ADMISSION [facility] DR LEVY TY    DVT   11/18/2018 Seen by Dr Kaiser ivcf recommended but daughter Mrs Campos declined   11/17/2018 Started on Xarelto 15.2 (11/17)   11/16/2018 DW Dr Ty and with family Pt is steady on her feet and this was a trip fall Pros and cons of doac explained to pt family Xarelto agreed upon by Dr Ty   11/14/2018 V duplex R leg DVT     ELEVATED CREAT   Monitored serially Improved     PATCHY OPACITY RML ON CT CHEST DONE 11/14  This could be a pulmonary infarct Will dw IR to see if ctnb is feasible Will need serial followup and will need PET after DC   11/16/2018 I roldan Farah He feels that instead of ctnb we should rep[eat CT Chest in 2 m Message sent to Dr Ty     PNEUMONIA   Bacterial pneumonia unlikely in absence of procalcitonin leukocytosis and fever  DCed abio (11/15/2018)   11/14 bc n 11/14 uc n     TIME SPENT Over 25 minutes aggregate care time spent on encounter; activities included   direct patient care, counseling and/or coordinating care reviewing notes, lab data/ imaging , discussion with multidisciplinary team/ patient  /family. Risks, benefits, alternatives  discussed in detail.

## 2018-11-20 NOTE — PROGRESS NOTE ADULT - NSHPATTENDINGPLANDISCUSS_GEN_ALL_CORE
MED STAFF  ,   , .  ANTICIPATE D/C IN AM MED STAFF  ,   , .  ANTICIPATE D/C IN AM LEFT A MESSAGE FOR HCP JESSICA AT 9.45 AM

## 2018-11-21 RX ADMIN — Medication 100 MILLIGRAM(S): at 06:17

## 2018-11-21 RX ADMIN — RISPERIDONE 1 MILLIGRAM(S): 4 TABLET ORAL at 17:11

## 2018-11-21 RX ADMIN — RISPERIDONE 1 MILLIGRAM(S): 4 TABLET ORAL at 06:18

## 2018-11-21 RX ADMIN — Medication 100 MILLIGRAM(S): at 17:11

## 2018-11-21 RX ADMIN — SERTRALINE 50 MILLIGRAM(S): 25 TABLET, FILM COATED ORAL at 11:21

## 2018-11-21 RX ADMIN — RIVAROXABAN 15 MILLIGRAM(S): KIT at 17:11

## 2018-11-21 RX ADMIN — PANTOPRAZOLE SODIUM 40 MILLIGRAM(S): 20 TABLET, DELAYED RELEASE ORAL at 06:18

## 2018-11-21 RX ADMIN — SERTRALINE 25 MILLIGRAM(S): 25 TABLET, FILM COATED ORAL at 11:21

## 2018-11-21 RX ADMIN — RIVAROXABAN 15 MILLIGRAM(S): KIT at 06:18

## 2018-11-21 RX ADMIN — MIRTAZAPINE 15 MILLIGRAM(S): 45 TABLET, ORALLY DISINTEGRATING ORAL at 21:25

## 2018-11-21 NOTE — DIETITIAN INITIAL EVALUATION ADULT. - SIGNS/SYMPTOMS
evidenced by po intake <75% of estd needs since admission, BMI 17 evidenced by bedside swallow eval 11/16

## 2018-11-21 NOTE — PROGRESS NOTE ADULT - SUBJECTIVE AND OBJECTIVE BOX
PROGRESS NOTE  Patient is a 82y old  Female who presents with a chief complaint of pt is a 83yo female with pmhx of dementia and htn [facility] ems s/p fall. ems reports pt tripped over someone else's walker, pt walks without assistance at baseline. pt found to be febrile on arrival 100.9 Admitted for septic workup and evaluation,send blood and urine cx,serial lactate levels,monitor vitals closley,ivfs hydration,monitor urine output and renal profile,iv abx initiated  pt without complaints. Daughter is at bedside and states patient has a steady gait Found to have DVT and started on full dose AC (20 Nov 2018 14:27)    Chart and available morning labs /imaging are reviewed electronically , urgent issues addressed . More information  is being added upon completion of rounds , when more information is collected and management discussed with consultants , medical staff and social service/case management on the floor   OVERNIGHT  No new issues reported by medical staff . All above noted Patient is resting in a bed comfortably .Confused ,poor mentation .No distress noted   On enhanced supervision   HPI:  pt is a 83yo female with pmhx of dementia and htn [facility] ems s/p fall. ems reports pt tripped over someone else's walker, pt walks without assistance at baseline. pt found to be febrile on arrival 100.9 Admitted for septic workup and evaluation,send blood and urine cx,serial lactate levels,monitor vitals closley,ivfs hydration,monitor urine output and renal profile,iv abx initiated  pt without complaints. Daughter is at bedside and states patient has a steady gait Found to have DVT and started on full dose AC (14 Nov 2018 21:23)    PAST MEDICAL & SURGICAL HISTORY:  Anxiety  HTN (hypertension)  Depression  Dementia  No significant past surgical history      MEDICATIONS  (STANDING):  docusate sodium 100 milliGRAM(s) Oral two times a day  mirtazapine 15 milliGRAM(s) Oral at bedtime  pantoprazole    Tablet 40 milliGRAM(s) Oral before breakfast  risperiDONE   Tablet 1 milliGRAM(s) Oral two times a day  rivaroxaban 15 milliGRAM(s) Oral two times a day  sertraline 25 milliGRAM(s) Oral daily  sertraline 50 milliGRAM(s) Oral daily    MEDICATIONS  (PRN):  LORazepam    Concentrate 0.5 milliGRAM(s) SubLingual every 8 hours PRN Anxiety  OLANZapine Injectable 5 milliGRAM(s) IntraMuscular every 6 hours PRN Acute agitation      OBJECTIVE    T(C): 36.7 (11-21-18 @ 05:18), Max: 36.9 (11-20-18 @ 13:12)  HR: 85 (11-21-18 @ 05:18) (75 - 96)  BP: 160/79 (11-21-18 @ 05:18) (142/83 - 160/88)  RR: 17 (11-21-18 @ 05:18) (16 - 18)  SpO2: 96% (11-21-18 @ 05:18) (94% - 96%)  Wt(kg): --  I&O's Summary        REVIEW OF SYSTEMS:  ROS is unobtainable due to  confusion   Not able to anser my questions   PHYSICAL EXAM:  Appearance: NAD. VS past 24 hrs -as above Cheek hematoma is fading ,swelling resolved   HEENT:   Moist oral mucosa. Conjunctiva clear b/l.   Neck : supple  Respiratory: Lungs CTAB.  Gastrointestinal:  Soft, nontender. No rebound. No rigidity. BS present	  Cardiovascular: RRR ,S1S2 present  Neurologic: Non-focal. Moving all extremities.  Extremities: No edema. No erythema. No calf tenderness.  Skin: No rashes, No ecchymoses, No cyanosis.	  wounds ,skin lesions-See skin assesment flow sheet   LABS:          CAPILLARY BLOOD GLUCOSE              Culture - Blood (collected 14 Nov 2018 22:10)  Source: .Blood Blood-Peripheral  Final Report (19 Nov 2018 23:01):    No growth at 5 days.    Culture - Blood (collected 14 Nov 2018 22:10)  Source: .Blood Blood-Peripheral  Final Report (19 Nov 2018 23:01):    No growth at 5 days.    Culture - Urine (collected 14 Nov 2018 21:51)  Source: .Urine Catheterized  Final Report (15 Nov 2018 22:04):    No growth      RADIOLOGY & ADDITIONAL TESTS:   reviewed elctronically  ASSESSMENT/PLAN:

## 2018-11-21 NOTE — PROGRESS NOTE ADULT - NSHPATTENDINGPLANDISCUSS_GEN_ALL_CORE
MED STAFF  ,   , .  ,spoke to the daughter Marlena on a phone ,d/c plan discussed Family requests [facility] placement and Social service is aware .Discharge when the bed is available .

## 2018-11-21 NOTE — PROGRESS NOTE ADULT - SUBJECTIVE AND OBJECTIVE BOX
Patient is a 82y Female with a known history of :  Delirium (R41.0)  ANNY (acute kidney injury) (N17.9)  Prophylactic measure (Z29.9)  Anxiety (F41.9)  Dementia (F03.90)  Depression (F32.9)  HTN (hypertension) (I10)  PNA (pneumonia) (J18.9)  DVT (deep venous thrombosis) (I82.409)  Fall (W19.XXXA)  Suspected deep vein thrombosis (888365507)    HPI:  pt is a 83yo female with pmhx of dementia and htn [facility] ems s/p fall. ems reports pt tripped over someone else's walker, pt walks without assistance at baseline. pt found to be febrile on arrival 100.9 Admitted for septic workup and evaluation,send blood and urine cx,serial lactate levels,monitor vitals closley,ivfs hydration,monitor urine output and renal profile,iv abx initiated  pt without complaints. Daughter is at bedside and states patient has a steady gait Found to have DVT and started on full dose AC (14 Nov 2018 21:23)      REVIEW OF SYSTEMS:    CONSTITUTIONAL: No fever, weight loss, or fatigue  EYES: No eye pain, visual disturbances, or discharge  ENMT:  No difficulty hearing, tinnitus, vertigo; No sinus or throat pain  NECK: No pain or stiffness  BREASTS: No pain, masses, or nipple discharge  RESPIRATORY: No cough, wheezing, chills or hemoptysis; No shortness of breath  CARDIOVASCULAR: No chest pain, palpitations, dizziness, or leg swelling  GASTROINTESTINAL: No abdominal or epigastric pain. No nausea, vomiting, or hematemesis; No diarrhea or constipation. No melena or hematochezia.  GENITOURINARY: No dysuria, frequency, hematuria, or incontinence  NEUROLOGICAL: No headaches, memory loss, loss of strength, numbness, or tremors  SKIN: No itching, burning, rashes, or lesions   LYMPH NODES: No enlarged glands  ENDOCRINE: No heat or cold intolerance; No hair loss  MUSCULOSKELETAL: No joint pain or swelling; No muscle, back, or extremity pain  PSYCHIATRIC: No depression, anxiety, mood swings, or difficulty sleeping  HEME/LYMPH: No easy bruising, or bleeding gums  ALLERGY AND IMMUNOLOGIC: No hives or eczema    MEDICATIONS  (STANDING):  docusate sodium 100 milliGRAM(s) Oral two times a day  mirtazapine 15 milliGRAM(s) Oral at bedtime  pantoprazole    Tablet 40 milliGRAM(s) Oral before breakfast  risperiDONE   Tablet 1 milliGRAM(s) Oral two times a day  rivaroxaban 15 milliGRAM(s) Oral two times a day  sertraline 25 milliGRAM(s) Oral daily  sertraline 50 milliGRAM(s) Oral daily    MEDICATIONS  (PRN):  LORazepam    Concentrate 0.5 milliGRAM(s) SubLingual every 8 hours PRN Anxiety  OLANZapine Injectable 5 milliGRAM(s) IntraMuscular every 6 hours PRN Acute agitation      ALLERGIES: No Known Allergies      FAMILY HISTORY:  No pertinent family history in first degree relatives      PHYSICAL EXAMINATION:  -----------------------------  T(C): 36.7 (11-21-18 @ 05:18), Max: 36.9 (11-20-18 @ 13:12)  HR: 85 (11-21-18 @ 05:18) (75 - 96)  BP: 160/79 (11-21-18 @ 05:18) (142/83 - 160/88)  RR: 17 (11-21-18 @ 05:18) (16 - 18)  SpO2: 96% (11-21-18 @ 05:18) (94% - 96%)  Wt(kg): --    11-21 @ 07:01  -  11-21 @ 10:01  --------------------------------------------------------  IN:    Oral Fluid: 220 mL  Total IN: 220 mL    OUT:  Total OUT: 0 mL    Total NET: 220 mL            Constitutional: well developed, normal appearance, well groomed, well nourished, no deformities and no acute distress.   Eyes: the conjunctiva exhibited no abnormalities and the eyelids demonstrated no xanthelasmas.   HEENT: normal oral mucosa, no oral pallor and no oral cyanosis.   Neck: normal jugular venous A waves present, normal jugular venous V waves present and no jugular venous lao A waves.   Pulmonary: no respiratory distress, normal respiratory rhythm and effort, no accessory muscle use and lungs were clear to auscultation bilaterally.   Cardiovascular: heart rate and rhythm were normal, normal S1 and S2 and no murmur, gallop, rub, heave or thrill are present.   Abdomen: soft, non-tender, no hepato-splenomegaly and no abdominal mass palpated.   Musculoskeletal: the gait could not be assessed..   Extremities: no clubbing of the fingernails, no localized cyanosis, no petechial hemorrhages and no ischemic changes.   Skin: normal skin color and pigmentation, no rash, no venous stasis, no skin lesions, no skin ulcer and no xanthoma was observed.   Psychiatric: oriented to person, place, and time, the affect was normal, the mood was normal and not feeling anxious.     LABS:   --------                     RADIOLOGY:  -----------------        ECG:

## 2018-11-21 NOTE — PROGRESS NOTE ADULT - SUBJECTIVE AND OBJECTIVE BOX
Neurology follow up note    LYUBOV [id number]      Interval History:    Patient resting in bed    MEDICATIONS    docusate sodium 100 milliGRAM(s) Oral two times a day  LORazepam    Concentrate 0.5 milliGRAM(s) SubLingual every 8 hours PRN  mirtazapine 15 milliGRAM(s) Oral at bedtime  OLANZapine Injectable 5 milliGRAM(s) IntraMuscular every 6 hours PRN  pantoprazole    Tablet 40 milliGRAM(s) Oral before breakfast  risperiDONE   Tablet 1 milliGRAM(s) Oral two times a day  rivaroxaban 15 milliGRAM(s) Oral two times a day  sertraline 25 milliGRAM(s) Oral daily  sertraline 50 milliGRAM(s) Oral daily      Allergies    No Known Allergies    Intolerances            Vital Signs Last 24 Hrs  T(C): 36.7 (21 Nov 2018 05:18), Max: 36.7 (21 Nov 2018 05:18)  T(F): 98.1 (21 Nov 2018 05:18), Max: 98.1 (21 Nov 2018 05:18)  HR: 85 (21 Nov 2018 05:18) (75 - 85)  BP: 160/79 (21 Nov 2018 05:18) (160/79 - 160/88)  BP(mean): --  RR: 17 (21 Nov 2018 05:18) (17 - 18)  SpO2: 96% (21 Nov 2018 05:18) (96% - 96%)      REVIEW OF SYSTEMS:  Extremely limited secondary to the patient's baseline that she says one to two words and occasionally will articulate.    PHYSICAL EXAMINATION:     HEENT:  Head:  Positive trauma was noted over the left eye.    Eyes:  No scleral icterus.    Ears:  Hearing hard to evaluate.    NECK:  Supple.    RESPIRATORY:  Decreased breath sounds bilaterally.    CARDIOVASCULAR:  S1 and S2 are heard.    ABDOMEN:  Soft and nontender.    EXTREMITIES:  No clubbing or cyanosis were noted.      NEUROLOGIC:  The patient is awake and alert.    Extraocular movements were intact.  Positive blink to bilateral visual threat.    The patient would mumble, occasionally would say one word, was off and on following commands.    Motor:  With stimuli, did move all four extremities.  No focality was noted.    Tone:  Bilateral upper and lower was increased.            LABS:            Hemoglobin A1C:       Vitamin B12         RADIOLOGY    ANALYSIS AND PLAN:  An 82-year-old with an episode of fall, head trauma, and change in mental status.  1.	For episodes of fall, this appears to be a mechanical fall.  There is no clear history of epilepsy reported.  The examination was limited, but no signs to suggest a new cerebrovascular accident has ensued.  2.	For episode of head trauma, would recommend to continue neuro checks.  3.	For change in mental status, most likely secondary to dementia made worse in the hospital setting.  4.	For history of dementia, secondary to advanced symptoms, do not feel medications would be of any benefit.  The patient did try medications in the past.  5.	Spoke with the daughter, Marychuy, at [phone] in past  6.	Physical Therapy evaluation.  7.	Fall precautions.  8.	monitor intake  9.	Greater than 40 minutes of time was spent with the patient, plan of care, reviewing data, and speaking to the family and multidisciplinary healthcare team.    Thank you for the courtesy of this consultation.

## 2018-11-21 NOTE — PROGRESS NOTE ADULT - SUBJECTIVE AND OBJECTIVE BOX
Patient was examined Chart reviewed Continue management as per recommendations in my prior note  Detailed note will be inserted below once more data is gathered and processed Patient was examined Chart reviewed Continue management as per recommendations in my prior note  Detailed note will be inserted below once more data is gathered and processed           Adriana Swanson [facility]   ALLERGY nka  CONTACT Dtr Tameka Campos  Self   REASON FOR VISIT Subsequent Pulmonary followup  Initial evaluation/Pulmonary consultation requested 11/14/2018 by Dr Olivares from Dr Khalil   Patient examined chart reviewed  HOSPITAL ADMISSION [facility] 11/14/2018   PATIENT CAME  FROM (if information available)      VITALS/LABS                           11/21/2018 afeb 97 130/65  18 97% 54  11/19/2018 Na 142 K 3.3 CO2 23 Cr .7    11/18/2018 W 6.9 Hb 10.4 Plt 202 INR 1.5 Na 146 K 3.3 CO2 24 Cr .8     REVIEW OF SYMPTOMS    Able to give ROS  NO     PHYSICAL EXAM    HEENT Unremarkable PERRLA atraumatic   RESP Fair air entry EXP prolonged    Harsh breath sound Resp distres mild   CARDIAC S1 S2 No S3     NO JVD    ABDOMEN SOFT BS PRESENT NOT DISTENDED No hepatosplenomegaly PEDAL EDEMA present No calf tenderness  NO rash   GENERAL Not TOXIC looking    PATIENT MANAGEMENT   11/14/2018 ADMISSION [facility] P DR LEVY TY   11/14/2018 83yo female with pmhx of dementia and htn [facility] ems s/p fall. ems reports pt tripped over someone else's walker, pt walks without assistance at baseline. pt found to be febrile on arrival 100.9. pt without complaints.   pmd: irving    Pt was found to have DVT and was started on full dose lovenox 11/14 She was also found to have patchy pl based opacity rml Case was dw IR and Dr Farah felt that ctnb should not be done but a repeat CT ch should be done in 2 m Dr Kaiser was consulted 11/17/2018 to decide re ivcf vs Xarelto and this was dw family in detail on 11/17/2018   Pt was seen by Dr Kaiser and fam declined IVCF which was recommended     ASSESSMENT/RECOMMENDATIONS  11/14/2018 ADMISSION [facility] P DR LEVY TY    DVT   11/18/2018 Seen by Dr Kaiser ivcf recommended but daughter Mrs Campos declined   11/17/2018 Started on Xarelto 15.2 (11/17)   11/16/2018 DW Dr Ty and with family Pt is steady on her feet and this was a trip fall Pros and cons of doac explained to pt family Xarelto agreed upon by Dr Ty   11/14/2018 V duplex R leg DVT     ELEVATED CREAT   Monitored serially Improved     PATCHY OPACITY RML ON CT CHEST DONE 11/14  This could be a pulmonary infarct Will dw IR to see if ctnb is feasible Will need serial followup and will need PET after DC   11/16/2018 I roldan Farah He feels that instead of ctnb we should rep[eat CT Chest in 2 m Message sent to Dr Ty     PNEUMONIA   Bacterial pneumonia unlikely in absence of procalcitonin leukocytosis and fever  DCed abio (11/15/2018)   11/14 bc n 11/14 uc n     TIME SPENT Over 25 minutes aggregate care time spent on encounter; activities included   direct patient care, counseling and/or coordinating care reviewing notes, lab data/ imaging , discussion with multidisciplinary team/ patient  /family. Risks, benefits, alternatives  discussed in detail.

## 2018-11-21 NOTE — PROGRESS NOTE ADULT - PROBLEM SELECTOR PLAN 2
Seen by vascular surgeon - Patient  has a indication for a filter however her daughter (Mrs. Campos ) does not want any surgical procedures and wants her treated with AC therapy. She is aware of the risks of both surgical and nonsurgical interventions. She is requesting Hospice care Hospice evaluation requested due to poor prognosis .Case discussed with palliative care representative,comfort care and palliative measures seem to be appropriate level of care at this point

## 2018-11-21 NOTE — DIETITIAN INITIAL EVALUATION ADULT. - OTHER INFO
Pt from [facility].  Hospice consult ordered, family considering [facility] for hospice. Pt has not been eating since admission, 0-25% documented in EMR.

## 2018-11-22 RX ADMIN — PANTOPRAZOLE SODIUM 40 MILLIGRAM(S): 20 TABLET, DELAYED RELEASE ORAL at 06:10

## 2018-11-22 RX ADMIN — SERTRALINE 50 MILLIGRAM(S): 25 TABLET, FILM COATED ORAL at 11:30

## 2018-11-22 RX ADMIN — RISPERIDONE 1 MILLIGRAM(S): 4 TABLET ORAL at 17:06

## 2018-11-22 RX ADMIN — Medication 100 MILLIGRAM(S): at 17:06

## 2018-11-22 RX ADMIN — RISPERIDONE 1 MILLIGRAM(S): 4 TABLET ORAL at 06:10

## 2018-11-22 RX ADMIN — RIVAROXABAN 15 MILLIGRAM(S): KIT at 06:10

## 2018-11-22 RX ADMIN — MIRTAZAPINE 15 MILLIGRAM(S): 45 TABLET, ORALLY DISINTEGRATING ORAL at 22:43

## 2018-11-22 RX ADMIN — RIVAROXABAN 15 MILLIGRAM(S): KIT at 17:06

## 2018-11-22 RX ADMIN — Medication 100 MILLIGRAM(S): at 06:10

## 2018-11-22 RX ADMIN — SERTRALINE 25 MILLIGRAM(S): 25 TABLET, FILM COATED ORAL at 11:30

## 2018-11-22 NOTE — PROGRESS NOTE ADULT - SUBJECTIVE AND OBJECTIVE BOX
Adriana Swanson [facility]   ALLERGY nka  CONTACT Dtr Tameka Campos  Self   REASON FOR VISIT Subsequent Pulmonary followup  Initial evaluation/Pulmonary consultation requested 11/14/2018 by Dr Olivares from Dr Khalil   Patient examined chart reviewed  HOSPITAL ADMISSION [facility] 11/14/2018   PATIENT CAME  FROM (if information available)      VITALS/LABS                           11/22/2018 100f 92 150/70   11/22/2018 W 7.9  Hb 11.6 Plt 310 Na 142 K 5 CO2 40 Cr .8     REVIEW OF SYMPTOMS    Able to give ROS  NO     PHYSICAL EXAM    HEENT Unremarkable PERRLA atraumatic   RESP Fair air entry EXP prolonged    Harsh breath sound Resp distres mild   CARDIAC S1 S2 No S3     NO JVD    ABDOMEN SOFT BS PRESENT NOT DISTENDED No hepatosplenomegaly PEDAL EDEMA present No calf tenderness  NO rash   GENERAL Not TOXIC looking    GLOBAL ISSUE/BEST PRACTICE:      PROBLEM: HOB elevation:   y            PROBLEM: Stress ulcer proph:    protonix 40 911/14)                       PROBLEM: VTE prophylaxis:      lvnx 50.2 (11/14) ? Rivaroxaban 15.2 (11/17)  PROBLEM: Glycemic control:    na  PROBLEM: Nutrition:    watkins (11/14)   PROBLEM: Advanced directive: na     PROBLEM: Allergies:  na  EVENT 11/17/2018 DW fam in detail re pros and cons of doac v vka v ivcf     PATIENT MANAGEMENT   11/14/2018 ADMISSION [facility] DR LEVY TY   11/14/2018 81yo female with pmhx of dementia and htn [facility] ems s/p fall. ems reports pt tripped over someone else's walker, pt walks without assistance at baseline. pt found to be febrile on arrival 100.9. pt without complaints.   pmd: irving    Pt was found to have DVT and was started on full dose lovenox 11/14 She was also found to have patchy pl based opacity rml Case was dw IR and Dr Farah felt that ctnb should not be done but a repeat CT ch should be done in 2 m Dr Kaiser was consulted 11/17/2018 to decide re ivcf vs Xarelto and this was dw family in detail on 11/17/2018   Pt was seen by Dr Kaiser and fam declined IVCF which was recommended     ASSESSMENT/RECOMMENDATIONS  11/14/2018 ADMISSION [facility] P DR LEVY TY    DVT   11/18/2018 Seen by Dr Kaiser ivcf recommended but daughter Mrs Campos declined   11/17/2018 Started on Xarelto 15.2 (11/17)   11/16/2018 DW Dr Ty and with family Pt is steady on her feet and this was a trip fall Pros and cons of doac explained to pt family Xarelto agreed upon by Dr Ty   11/14/2018 V duplex R leg DVT     ELEVATED CREAT   Monitored serially Improved     PATCHY OPACITY RML ON CT CHEST DONE 11/14  This could be a pulmonary infarct Will dw IR to see if ctnb is feasible Will need serial followup and will need PET after DC   11/16/2018 I dw Dr Farah He feels that instead of ctnb we should rep[eat CT Chest in 2 m Message sent to Dr Ty     PNEUMONIA   Bacterial pneumonia unlikely in absence of procalcitonin leukocytosis and fever  DCed abio (11/15/2018)   11/14 bc n 11/14 uc n     TIME SPENT Over 25 minutes aggregate care time spent on encounter; activities included   direct patient care, counseling and/or coordinating care reviewing notes, lab data/ imaging , discussion with multidisciplinary team/ patient  /family. Risks, benefits, alternatives  discussed in detail.

## 2018-11-22 NOTE — PROGRESS NOTE ADULT - NSHPATTENDINGPLANDISCUSS_GEN_ALL_CORE
MED STAFF  ,   , .  ,spoke to the daughter Marlena on a phone ,d/c plan discussed Family requests [facility] placement and Social service is aware .Discharge when the bed is available .

## 2018-11-22 NOTE — PROGRESS NOTE ADULT - SUBJECTIVE AND OBJECTIVE BOX
Patient is a 82y Female with past medical history of   increase of BUN/creatinine        presenting with   possible acute kidney injury     who reports no complaints overnight.  pt seen and examined in am nad     Allergies    No Known Allergies    Intolerances        MEDICATIONS  (STANDING):  docusate sodium 100 milliGRAM(s) Oral two times a day  enoxaparin Injectable 50 milliGRAM(s) SubCutaneous two times a day  mirtazapine 15 milliGRAM(s) Oral at bedtime  pantoprazole    Tablet 40 milliGRAM(s) Oral before breakfast  sertraline 25 milliGRAM(s) Oral daily  sertraline 50 milliGRAM(s) Oral daily                                                                                        CAPILLARY BLOOD GLUCOSE          Vital Signs Last 24 Hrs  T(C): 36.7 (22 Nov 2018 05:22), Max: 36.7 (21 Nov 2018 21:21)  T(F): 98.1 (22 Nov 2018 05:22), Max: 98.1 (21 Nov 2018 21:21)  HR: 94 (22 Nov 2018 05:22) (94 - 97)  BP: 136/80 (22 Nov 2018 05:22) (130/65 - 145/71)  BP(mean): --  RR: 16 (22 Nov 2018 05:22) (16 - 18)  SpO2: 94% (22 Nov 2018 05:22) (94% - 97%)                     PHYSICAL EXAM:    Constitutional: NAD,   HEENT:  conjunctiva clear   Neck: No JVD  Respiratory: CTAB  Cardiovascular: S1 and S2  Gastrointestinal: BS+, soft, NT/ND  Extremities: No peripheral edema

## 2018-11-22 NOTE — PROGRESS NOTE ADULT - SUBJECTIVE AND OBJECTIVE BOX
Neurology follow up note    LYUBOV [id number]      Interval History:    Patient resting in bed    MEDICATIONS    docusate sodium 100 milliGRAM(s) Oral two times a day  LORazepam    Concentrate 0.5 milliGRAM(s) SubLingual every 8 hours PRN  mirtazapine 15 milliGRAM(s) Oral at bedtime  OLANZapine Injectable 5 milliGRAM(s) IntraMuscular every 6 hours PRN  pantoprazole    Tablet 40 milliGRAM(s) Oral before breakfast  risperiDONE   Tablet 1 milliGRAM(s) Oral two times a day  rivaroxaban 15 milliGRAM(s) Oral two times a day  sertraline 25 milliGRAM(s) Oral daily  sertraline 50 milliGRAM(s) Oral daily      Allergies    No Known Allergies    Intolerances            Vital Signs Last 24 Hrs  T(C): 36.7 (22 Nov 2018 05:22), Max: 36.7 (21 Nov 2018 21:21)  T(F): 98.1 (22 Nov 2018 05:22), Max: 98.1 (21 Nov 2018 21:21)  HR: 94 (22 Nov 2018 05:22) (94 - 97)  BP: 136/80 (22 Nov 2018 05:22) (130/65 - 145/71)  BP(mean): --  RR: 16 (22 Nov 2018 05:22) (16 - 18)  SpO2: 94% (22 Nov 2018 05:22) (94% - 97%)    REVIEW OF SYSTEMS:  Extremely limited secondary to the patient's baseline that she says one to two words and occasionally will articulate.    PHYSICAL EXAMINATION:     HEENT:  Head:  Positive trauma was noted over the left eye.    Eyes:  No scleral icterus.    Ears:  Hearing hard to evaluate.    NECK:  Supple.    RESPIRATORY:  Decreased breath sounds bilaterally.    CARDIOVASCULAR:  S1 and S2 are heard.    ABDOMEN:  Soft and nontender.    EXTREMITIES:  No clubbing or cyanosis were noted.      NEUROLOGIC:  The patient is awake and alert.    Extraocular movements were intact.  Positive blink to bilateral visual threat.    The patient would mumble, occasionally would say one word, was off and on following commands.    Motor:  With stimuli, did move all four extremities.  No focality was noted.    Tone:  Bilateral upper and lower was increased.                 LABS:            Hemoglobin A1C:       Vitamin B12         RADIOLOGY    ANALYSIS AND PLAN:  An 82-year-old with an episode of fall, head trauma, and change in mental status.  1.	For episodes of fall, this appears to be a mechanical fall.  There is no clear history of epilepsy reported.  The examination was limited, but no signs to suggest a new cerebrovascular accident has ensued.  2.	For episode of head trauma, would recommend to continue neuro checks.  3.	For change in mental status, most likely secondary to dementia made worse in the hospital setting.  4.	For history of dementia, secondary to advanced symptoms, do not feel medications would be of any benefit.  The patient did try medications in the past.  5.	Spoke with the daughter, Marychuy, at [phone] in past  6.	Physical Therapy evaluation.  7.	Fall precautions.  8.	monitor intake  9.	no new events noted   10.	Greater than 30  minutes of time was spent with the patient, plan of care, reviewing data, and speaking to the family and multidisciplinary healthcare team.    Thank you for the courtesy of this consultation.

## 2018-11-22 NOTE — PROGRESS NOTE ADULT - SUBJECTIVE AND OBJECTIVE BOX
Patient is a 82y Female with a known history of :  Delirium (R41.0)  ANNY (acute kidney injury) (N17.9)  Prophylactic measure (Z29.9)  Anxiety (F41.9)  Dementia (F03.90)  Depression (F32.9)  HTN (hypertension) (I10)  PNA (pneumonia) (J18.9)  DVT (deep venous thrombosis) (I82.409)  Fall (W19.XXXA)  Suspected deep vein thrombosis ([id number])    HPI:  pt is a 83yo female with pmhx of dementia and htn [facility] ems s/p fall. ems reports pt tripped over someone else's walker, pt walks without assistance at baseline. pt found to be febrile on arrival 100.9 Admitted for septic workup and evaluation,send blood and urine cx,serial lactate levels,monitor vitals closley,ivfs hydration,monitor urine output and renal profile,iv abx initiated  pt without complaints. Daughter is at bedside and states patient has a steady gait Found to have DVT and started on full dose AC (14 Nov 2018 21:23)      REVIEW OF SYSTEMS:    CONSTITUTIONAL: No fever, weight loss, or fatigue  EYES: No eye pain, visual disturbances, or discharge  ENMT:  No difficulty hearing, tinnitus, vertigo; No sinus or throat pain  NECK: No pain or stiffness  BREASTS: No pain, masses, or nipple discharge  RESPIRATORY: No cough, wheezing, chills or hemoptysis; No shortness of breath  CARDIOVASCULAR: No chest pain, palpitations, dizziness, or leg swelling  GASTROINTESTINAL: No abdominal or epigastric pain. No nausea, vomiting, or hematemesis; No diarrhea or constipation. No melena or hematochezia.  GENITOURINARY: No dysuria, frequency, hematuria, or incontinence  NEUROLOGICAL: No headaches, memory loss, loss of strength, numbness, or tremors  SKIN: No itching, burning, rashes, or lesions   LYMPH NODES: No enlarged glands  ENDOCRINE: No heat or cold intolerance; No hair loss  MUSCULOSKELETAL: No joint pain or swelling; No muscle, back, or extremity pain  PSYCHIATRIC: No depression, anxiety, mood swings, or difficulty sleeping  HEME/LYMPH: No easy bruising, or bleeding gums  ALLERGY AND IMMUNOLOGIC: No hives or eczema    MEDICATIONS  (STANDING):  docusate sodium 100 milliGRAM(s) Oral two times a day  mirtazapine 15 milliGRAM(s) Oral at bedtime  pantoprazole    Tablet 40 milliGRAM(s) Oral before breakfast  risperiDONE   Tablet 1 milliGRAM(s) Oral two times a day  rivaroxaban 15 milliGRAM(s) Oral two times a day  sertraline 25 milliGRAM(s) Oral daily  sertraline 50 milliGRAM(s) Oral daily    MEDICATIONS  (PRN):  LORazepam    Concentrate 0.5 milliGRAM(s) SubLingual every 8 hours PRN Anxiety  OLANZapine Injectable 5 milliGRAM(s) IntraMuscular every 6 hours PRN Acute agitation      ALLERGIES: No Known Allergies      FAMILY HISTORY:  No pertinent family history in first degree relatives      PHYSICAL EXAMINATION:  -----------------------------  T(C): 36.7 (11-22-18 @ 05:22), Max: 36.7 (11-21-18 @ 21:21)  HR: 94 (11-22-18 @ 05:22) (94 - 97)  BP: 136/80 (11-22-18 @ 05:22) (130/65 - 145/71)  RR: 16 (11-22-18 @ 05:22) (16 - 18)  SpO2: 94% (11-22-18 @ 05:22) (94% - 97%)  Wt(kg): --    11-21 @ 07:01  -  11-22 @ 07:00  --------------------------------------------------------  IN:    Oral Fluid: 440 mL  Total IN: 440 mL    OUT:  Total OUT: 0 mL    Total NET: 440 mL            Constitutional: well developed, normal appearance, well groomed, well nourished, no deformities and no acute distress.   Eyes: the conjunctiva exhibited no abnormalities and the eyelids demonstrated no xanthelasmas.   HEENT: normal oral mucosa, no oral pallor and no oral cyanosis.   Neck: normal jugular venous A waves present, normal jugular venous V waves present and no jugular venous lao A waves.   Pulmonary: no respiratory distress, normal respiratory rhythm and effort, no accessory muscle use and lungs were clear to auscultation bilaterally.   Cardiovascular: heart rate and rhythm were normal, normal S1 and S2 and no murmur, gallop, rub, heave or thrill are present.   Abdomen: soft, non-tender, no hepato-splenomegaly and no abdominal mass palpated.   Musculoskeletal: the gait could not be assessed..   Extremities: no clubbing of the fingernails, no localized cyanosis, no petechial hemorrhages and no ischemic changes.   Skin: normal skin color and pigmentation, no rash, no venous stasis, no skin lesions, no skin ulcer and no xanthoma was observed.   Psychiatric: oriented to person, place, and time, the affect was normal, the mood was normal and not feeling anxious.     LABS:   --------                     RADIOLOGY:  -----------------        ECG:

## 2018-11-22 NOTE — PROGRESS NOTE ADULT - SUBJECTIVE AND OBJECTIVE BOX
PROGRESS NOTE  Patient is a 82y old  Female who presents with a chief complaint of pt is a 83yo female with pmhx of dementia and htn [facility] ems s/p fall. ems reports pt tripped over someone else's walker, pt walks without assistance at baseline. pt found to be febrile on arrival 100.9 Admitted for septic workup and evaluation,send blood and urine cx,serial lactate levels,monitor vitals closley,ivfs hydration,monitor urine output and renal profile,iv abx initiated  pt without complaints. Daughter is at bedside and states patient has a steady gait Found to have DVT and started on full dose AC (22 Nov 2018 10:20)  Chart and available morning labs /imaging are reviewed electronically , urgent issues addressed . More information  is being added upon completion of rounds , when more information is collected and management discussed with consultants , medical staff and social service/case management on the floor     OVERNIGHT      HPI:  pt is a 83yo female with pmhx of dementia and htn [facility] ems s/p fall. ems reports pt tripped over someone else's walker, pt walks without assistance at baseline. pt found to be febrile on arrival 100.9 Admitted for septic workup and evaluation,send blood and urine cx,serial lactate levels,monitor vitals closley,ivfs hydration,monitor urine output and renal profile,iv abx initiated  pt without complaints. Daughter is at bedside and states patient has a steady gait Found to have DVT and started on full dose AC (14 Nov 2018 21:23)    PAST MEDICAL & SURGICAL HISTORY:  Anxiety  HTN (hypertension)  Depression  Dementia  No significant past surgical history      MEDICATIONS  (STANDING):  docusate sodium 100 milliGRAM(s) Oral two times a day  mirtazapine 15 milliGRAM(s) Oral at bedtime  pantoprazole    Tablet 40 milliGRAM(s) Oral before breakfast  risperiDONE   Tablet 1 milliGRAM(s) Oral two times a day  rivaroxaban 15 milliGRAM(s) Oral two times a day  sertraline 25 milliGRAM(s) Oral daily  sertraline 50 milliGRAM(s) Oral daily    MEDICATIONS  (PRN):  LORazepam    Concentrate 0.5 milliGRAM(s) SubLingual every 8 hours PRN Anxiety  OLANZapine Injectable 5 milliGRAM(s) IntraMuscular every 6 hours PRN Acute agitation      OBJECTIVE    T(C): 36.7 (11-22-18 @ 05:22), Max: 36.7 (11-21-18 @ 21:21)  HR: 94 (11-22-18 @ 05:22) (94 - 97)  BP: 136/80 (11-22-18 @ 05:22) (130/65 - 145/71)  RR: 16 (11-22-18 @ 05:22) (16 - 18)  SpO2: 94% (11-22-18 @ 05:22) (94% - 97%)  Wt(kg): --  I&O's Summary    21 Nov 2018 07:01  -  22 Nov 2018 07:00  --------------------------------------------------------  IN: 440 mL / OUT: 0 mL / NET: 440 mL          REVIEW OF SYSTEMS:  CONSTITUTIONAL: No fever, weight loss, or fatigue  EYES: No eye pain, visual disturbances, or discharge  ENMT:   No sinus or throat pain  NECK: No pain or stiffness  RESPIRATORY: No cough, wheezing, chills or hemoptysis; No shortness of breath  CARDIOVASCULAR: No chest pain, palpitations, dizziness, or leg swelling  GASTROINTESTINAL: No abdominal pain. No nausea, vomiting; No diarrhea or constipation. No melena or hematochezia.  GENITOURINARY: No dysuria, frequency, hematuria, or incontinence  NEUROLOGICAL: No headaches, memory loss, loss of strength, numbness, or tremors  SKIN: No itching, burning, rashes, or lesions   MUSCULOSKELETAL: No joint pain or swelling; No muscle, back, or extremity pain    PHYSICAL EXAM:  Appearance: NAD. VS past 24 hrs -as above   HEENT:   Moist oral mucosa. Conjunctiva clear b/l.   Neck : supple  Respiratory: Lungs CTAB.  Gastrointestinal:  Soft, nontender. No rebound. No rigidity. BS present	  Cardiovascular: RRR ,S1S2 present  Neurologic: Non-focal. Moving all extremities.  Extremities: No edema. No erythema. No calf tenderness.  Skin: No rashes, No ecchymoses, No cyanosis.	  wounds ,skin lesions-See skin assesment flow sheet   LABS:          CAPILLARY BLOOD GLUCOSE              Culture - Blood (collected 14 Nov 2018 22:10)  Source: .Blood Blood-Peripheral  Final Report (19 Nov 2018 23:01):    No growth at 5 days.    Culture - Blood (collected 14 Nov 2018 22:10)  Source: .Blood Blood-Peripheral  Final Report (19 Nov 2018 23:01):    No growth at 5 days.    Culture - Urine (collected 14 Nov 2018 21:51)  Source: .Urine Catheterized  Final Report (15 Nov 2018 22:04):    No growth      RADIOLOGY & ADDITIONAL TESTS:   reviewed elctronically  ASSESSMENT/PLAN: PROGRESS NOTE  Patient is a 82y old  Female who presents with a chief complaint of pt is a 81yo female with pmhx of dementia and htn [facility] ems s/p fall. ems reports pt tripped over someone else's walker, pt walks without assistance at baseline. pt found to be febrile on arrival 100.9 Admitted for septic workup and evaluation,send blood and urine cx,serial lactate levels,monitor vitals closley,ivfs hydration,monitor urine output and renal profile,iv abx initiated  pt without complaints. Daughter is at bedside and states patient has a steady gait Found to have DVT and started on full dose AC (22 Nov 2018 10:20)  Chart and available morning labs /imaging are reviewed electronically , urgent issues addressed . More information  is being added upon completion of rounds , when more information is collected and management discussed with consultants , medical staff and social service/case management on the floor     OVERNIGHT  No new issues reported by medical staff . All above noted Patient is resting in a bed comfortably .Confused ,poor mentation .No distress noted     HPI:  pt is a 81yo female with pmhx of dementia and htn [facility] ems s/p fall. ems reports pt tripped over someone else's walker, pt walks without assistance at baseline. pt found to be febrile on arrival 100.9 Admitted for septic workup and evaluation,send blood and urine cx,serial lactate levels,monitor vitals closley,ivfs hydration,monitor urine output and renal profile,iv abx initiated  pt without complaints. Daughter is at bedside and states patient has a steady gait Found to have DVT and started on full dose AC (14 Nov 2018 21:23)    PAST MEDICAL & SURGICAL HISTORY:  Anxiety  HTN (hypertension)  Depression  Dementia  No significant past surgical history      MEDICATIONS  (STANDING):  docusate sodium 100 milliGRAM(s) Oral two times a day  mirtazapine 15 milliGRAM(s) Oral at bedtime  pantoprazole    Tablet 40 milliGRAM(s) Oral before breakfast  risperiDONE   Tablet 1 milliGRAM(s) Oral two times a day  rivaroxaban 15 milliGRAM(s) Oral two times a day  sertraline 25 milliGRAM(s) Oral daily  sertraline 50 milliGRAM(s) Oral daily    MEDICATIONS  (PRN):  LORazepam    Concentrate 0.5 milliGRAM(s) SubLingual every 8 hours PRN Anxiety  OLANZapine Injectable 5 milliGRAM(s) IntraMuscular every 6 hours PRN Acute agitation      OBJECTIVE    T(C): 36.7 (11-22-18 @ 05:22), Max: 36.7 (11-21-18 @ 21:21)  HR: 94 (11-22-18 @ 05:22) (94 - 97)  BP: 136/80 (11-22-18 @ 05:22) (130/65 - 145/71)  RR: 16 (11-22-18 @ 05:22) (16 - 18)  SpO2: 94% (11-22-18 @ 05:22) (94% - 97%)  Wt(kg): --  I&O's Summary    21 Nov 2018 07:01  -  22 Nov 2018 07:00  --------------------------------------------------------  IN: 440 mL / OUT: 0 mL / NET: 440 mL            REVIEW OF SYSTEMS:  ROS is unobtainable due to  confusion   Not able to anser my questions   PHYSICAL EXAM:  Appearance: NAD. VS past 24 hrs -as above Cheek hematoma is fading ,swelling resolved   HEENT:   Moist oral mucosa. Conjunctiva clear b/l.   Neck : supple  Respiratory: Lungs CTAB.  Gastrointestinal:  Soft, nontender. No rebound. No rigidity. BS present	  Cardiovascular: RRR ,S1S2 present  Neurologic: Non-focal. Moving all extremities.  Extremities: No edema. No erythema. No calf tenderness.  Skin: No rashes, No ecchymoses, No cyanosis.	  wounds ,skin lesions-See skin assesment flow sheet   LABS :n/a  CAPILLARY BLOOD GLUCOSE  n/a  Culture - Blood (collected 14 Nov 2018 22:10)  Source: .Blood Blood-Peripheral  Final Report (19 Nov 2018 23:01):    No growth at 5 days.  Culture - Blood (collected 14 Nov 2018 22:10)  Source: .Blood Blood-Peripheral  Final Report (19 Nov 2018 23:01):    No growth at 5 days.  Culture - Urine (collected 14 Nov 2018 21:51)  Source: .Urine Catheterized  Final Report (15 Nov 2018 22:04):    No growth  RADIOLOGY & ADDITIONAL TESTS:   reviewed electronically  ASSESSMENT/PLAN:

## 2018-11-23 RX ADMIN — RIVAROXABAN 15 MILLIGRAM(S): KIT at 05:30

## 2018-11-23 RX ADMIN — MIRTAZAPINE 15 MILLIGRAM(S): 45 TABLET, ORALLY DISINTEGRATING ORAL at 21:35

## 2018-11-23 RX ADMIN — RIVAROXABAN 15 MILLIGRAM(S): KIT at 17:23

## 2018-11-23 RX ADMIN — SERTRALINE 25 MILLIGRAM(S): 25 TABLET, FILM COATED ORAL at 11:54

## 2018-11-23 RX ADMIN — Medication 100 MILLIGRAM(S): at 17:24

## 2018-11-23 RX ADMIN — PANTOPRAZOLE SODIUM 40 MILLIGRAM(S): 20 TABLET, DELAYED RELEASE ORAL at 05:30

## 2018-11-23 RX ADMIN — RISPERIDONE 1 MILLIGRAM(S): 4 TABLET ORAL at 05:30

## 2018-11-23 RX ADMIN — SERTRALINE 50 MILLIGRAM(S): 25 TABLET, FILM COATED ORAL at 11:54

## 2018-11-23 RX ADMIN — Medication 100 MILLIGRAM(S): at 05:30

## 2018-11-23 NOTE — PROGRESS NOTE ADULT - SUBJECTIVE AND OBJECTIVE BOX
Adriana Swanson [facility] P   ALLERGY nka  CONTACT Dtr Tameka Campos  Self   REASON FOR VISIT Subsequent Pulmonary followup  Initial evaluation/Pulmonary consultation requested 11/14/2018 by Dr Olivares from Dr Khalil   Patient examined chart reviewed  HOSPITAL ADMISSION [facility]  P 11/14/2018   PATIENT CAME  FROM (if information available)      VITALS/LABS                           11/23/2018 afeb 86 140/68 15 95%   11/23/2018 W 6.9 Hb 10.4 Plt 202 Na 142 K 3.3 CP2 23 Cr .7     REVIEW OF SYMPTOMS    Able to give ROS  NO     PHYSICAL EXAM    HEENT Unremarkable PERRLA atraumatic   RESP Fair air entry EXP prolonged    Harsh breath sound Resp distres mild   CARDIAC S1 S2 No S3     NO JVD    ABDOMEN SOFT BS PRESENT NOT DISTENDED No hepatosplenomegaly PEDAL EDEMA present No calf tenderness  NO rash   GENERAL Not TOXIC looking    GLOBAL ISSUE/BEST PRACTICE:      PROBLEM: HOB elevation:   y            PROBLEM: Stress ulcer proph:    protonix 40 911/14)                       PROBLEM: VTE prophylaxis:      lvnx 50.2 (11/14) ? Rivaroxaban 15.2 (11/17)  PROBLEM: Glycemic control:    na  PROBLEM: Nutrition:    watkins (11/14)   PROBLEM: Advanced directive: na     PROBLEM: Allergies:  na  EVENT 11/17/2018 DW fam in detail re pros and cons of doac v vka v ivcf   EVENT AGITATION 11/14/2018 Constant observation     PATIENT MANAGEMENT   11/14/2018 ADMISSION [facility] P DR LEVY TY   11/14/2018 83yo female with pmhx of dementia and htn [facility] ems s/p fall. ems reports pt tripped over someone else's walker, pt walks without assistance at baseline. pt found to be febrile on arrival 100.9. pt without complaints.   pmd: irving    Pt was found to have DVT and was started on full dose lovenox 11/14 She was also found to have patchy pl based opacity rml Case was dw IR and Dr Farah felt that ctnb should not be done but a repeat CT ch should be done in 2 m Dr Kaiser was consulted 11/17/2018 to decide re ivcf vs Xarelto and this was dw family in detail on 11/17/2018   Pt was seen by Dr Kaiser and fam declined IVCF which was recommended     ASSESSMENT/RECOMMENDATIONS  11/14/2018 ADMISSION [facility] P DR LEVY TY    DVT   11/18/2018 Seen by Dr Kaiser ivcf recommended but daughter Mrs Campos declined   11/17/2018 Started on Xarelto 15.2 (11/17)   11/16/2018 DW Dr Ty and with family Pt is steady on her feet and this was a trip fall Pros and cons of doac explained to pt family Xarelto agreed upon by Dr Ty   11/14/2018 V duplex R leg DVT     ELEVATED CREAT   Monitored serially Improved     PATCHY OPACITY RML ON CT CHEST DONE 11/14  This could be a pulmonary infarct Will dw IR to see if ctnb is feasible Will need serial followup and will need PET after DC   11/16/2018 I dw Dr Farah He feels that instead of ctnb we should rep[eat CT Chest in 2 m Message sent to Dr Ty     PNEUMONIA   Bacterial pneumonia unlikely in absence of procalcitonin leukocytosis and fever  DCed abio (11/15/2018)   11/14 bc n 11/14 uc n     TIME SPENT Over 25 minutes aggregate care time spent on encounter; activities included   direct patient care, counseling and/or coordinating care reviewing notes, lab data/ imaging , discussion with multidisciplinary team/ patient  /family. Risks, benefits, alternatives  discussed in detail.

## 2018-11-23 NOTE — PROGRESS NOTE ADULT - SUBJECTIVE AND OBJECTIVE BOX
Neurology follow up note    LYUBOV [id number]      Interval History:    Patient resting in bed  MEDICATIONS    docusate sodium 100 milliGRAM(s) Oral two times a day  LORazepam    Concentrate 0.5 milliGRAM(s) SubLingual every 8 hours PRN  mirtazapine 15 milliGRAM(s) Oral at bedtime  OLANZapine Injectable 5 milliGRAM(s) IntraMuscular every 6 hours PRN  pantoprazole    Tablet 40 milliGRAM(s) Oral before breakfast  risperiDONE   Tablet 1 milliGRAM(s) Oral two times a day  rivaroxaban 15 milliGRAM(s) Oral two times a day  sertraline 25 milliGRAM(s) Oral daily  sertraline 50 milliGRAM(s) Oral daily      Allergies    No Known Allergies    Intolerances            Vital Signs Last 24 Hrs  T(C): 36.5 (23 Nov 2018 05:26), Max: 37.8 (22 Nov 2018 20:49)  T(F): 97.7 (23 Nov 2018 05:26), Max: 100.1 (22 Nov 2018 20:49)  HR: 95 (23 Nov 2018 05:26) (91 - 95)  BP: 137/79 (23 Nov 2018 05:26) (133/83 - 152/79)  BP(mean): --  RR: 18 (23 Nov 2018 05:26) (17 - 18)  SpO2: 96% (23 Nov 2018 05:26) (95% - 96%)    REVIEW OF SYSTEMS:  Extremely limited secondary to the patient's baseline that she says one to two words and occasionally will articulate.    PHYSICAL EXAMINATION:     HEENT:  Head:  Positive trauma was noted over the left eye.    Eyes:  No scleral icterus.    Ears:  Hearing hard to evaluate.    NECK:  Supple.    RESPIRATORY:  Decreased breath sounds bilaterally.    CARDIOVASCULAR:  S1 and S2 are heard.    ABDOMEN:  Soft and nontender.    EXTREMITIES:  No clubbing or cyanosis were noted.      NEUROLOGIC:  The patient is awake and alert.    Extraocular movements were intact.  Positive blink to bilateral visual threat.    The patient would mumble, occasionally would say one word, was off and on following commands.  Motor:  With stimuli, did move all four extremities.  No focality was noted.    Tone:  Bilateral upper and lower was increased.                 LABS:            Hemoglobin A1C:       Vitamin B12         RADIOLOGY        ANALYSIS AND PLAN:  An 82-year-old with an episode of fall, head trauma, and change in mental status.  1.	For episodes of fall, this appears to be a mechanical fall.  There is no clear history of epilepsy reported.  The examination was limited, but no signs to suggest a new cerebrovascular accident has ensued.  2.	For episode of head trauma, would recommend to continue neuro checks.  3.	For change in mental status, most likely secondary to dementia made worse in the hospital setting.  4.	For history of dementia, secondary to advanced symptoms, do not feel medications would be of any benefit.  The patient did try medications in the past.  5.	Spoke with the daughter, Marychuy, at [phone] 11/23/18  6.	Physical Therapy evaluation.  7.	Fall precautions.  8.	monitor intake  9.	no new events noted   10.	Greater than 30  minutes of time was spent with the patient, plan of care, reviewing data, and speaking to the family and multidisciplinary healthcare team.    Thank you for the courtesy of this consultation. Neurology follow up note    LYUBOV [id number]      Interval History:    Patient resting in bed  MEDICATIONS    docusate sodium 100 milliGRAM(s) Oral two times a day  LORazepam    Concentrate 0.5 milliGRAM(s) SubLingual every 8 hours PRN  mirtazapine 15 milliGRAM(s) Oral at bedtime  OLANZapine Injectable 5 milliGRAM(s) IntraMuscular every 6 hours PRN  pantoprazole    Tablet 40 milliGRAM(s) Oral before breakfast  risperiDONE   Tablet 1 milliGRAM(s) Oral two times a day  rivaroxaban 15 milliGRAM(s) Oral two times a day  sertraline 25 milliGRAM(s) Oral daily  sertraline 50 milliGRAM(s) Oral daily      Allergies    No Known Allergies    Intolerances            Vital Signs Last 24 Hrs  T(C): 36.5 (23 Nov 2018 05:26), Max: 37.8 (22 Nov 2018 20:49)  T(F): 97.7 (23 Nov 2018 05:26), Max: 100.1 (22 Nov 2018 20:49)  HR: 95 (23 Nov 2018 05:26) (91 - 95)  BP: 137/79 (23 Nov 2018 05:26) (133/83 - 152/79)  BP(mean): --  RR: 18 (23 Nov 2018 05:26) (17 - 18)  SpO2: 96% (23 Nov 2018 05:26) (95% - 96%)    REVIEW OF SYSTEMS:  Extremely limited secondary to the patient's baseline that she says one to two words and occasionally will articulate.    PHYSICAL EXAMINATION:     HEENT:  Head:  Positive trauma was noted over the left eye.    Eyes:  No scleral icterus.    Ears:  Hearing hard to evaluate.    NECK:  Supple.    RESPIRATORY:  Decreased breath sounds bilaterally.    CARDIOVASCULAR:  S1 and S2 are heard.    ABDOMEN:  Soft and nontender.    EXTREMITIES:  No clubbing or cyanosis were noted.      NEUROLOGIC:  The patient is awake and alert.    Extraocular movements were intact.  Positive blink to bilateral visual threat.    The patient would mumble, occasionally would say one word, was off and on following commands.  Motor:  With stimuli, did move all four extremities.  No focality was noted.    Tone:  Bilateral upper and lower was increased.                 LABS:            Hemoglobin A1C:       Vitamin B12         RADIOLOGY        ANALYSIS AND PLAN:  An 82-year-old with an episode of fall, head trauma, and change in mental status.  1.	For episodes of fall, this appears to be a mechanical fall.  There is no clear history of epilepsy reported.  The examination was limited, but no signs to suggest a new cerebrovascular accident has ensued.  2.	For episode of head trauma, would recommend to continue neuro checks.  3.	For change in mental status, most likely secondary to dementia made worse in the hospital setting.  4.	For history of dementia, secondary to advanced symptoms, do not feel medications would be of any benefit.  The patient did try medications in the past.  5.	Spoke with the daughter, Marychuy, at [phone] 11/23/18  6.	Physical Therapy evaluation.  7.	Fall precautions.  8.	monitor intake  9.	lethargy will dc Risperdal for now   10.	no new events noted   11.	Greater than 30  minutes of time was spent with the patient, plan of care, reviewing data, and speaking to the family and multidisciplinary healthcare team.    Thank you for the courtesy of this consultation.

## 2018-11-23 NOTE — PROGRESS NOTE ADULT - SUBJECTIVE AND OBJECTIVE BOX
Patient is a 82y Female with past medical history of   increase of BUN/creatinine        presenting with   possible acute kidney injury     who reports no complaints overnight.  pt seen and examined in am nad     Allergies    No Known Allergies    Intolerances        MEDICATIONS  (STANDING):  docusate sodium 100 milliGRAM(s) Oral two times a day  enoxaparin Injectable 50 milliGRAM(s) SubCutaneous two times a day  mirtazapine 15 milliGRAM(s) Oral at bedtime  pantoprazole    Tablet 40 milliGRAM(s) Oral before breakfast  sertraline 25 milliGRAM(s) Oral daily  sertraline 50 milliGRAM(s) Oral daily                                                                              CAPILLARY BLOOD GLUCOSE          Vital Signs Last 24 Hrs  T(C): 36.5 (23 Nov 2018 05:26), Max: 37.8 (22 Nov 2018 20:49)  T(F): 97.7 (23 Nov 2018 05:26), Max: 100.1 (22 Nov 2018 20:49)  HR: 95 (23 Nov 2018 05:26) (92 - 95)  BP: 137/79 (23 Nov 2018 05:26) (137/79 - 152/79)  BP(mean): --  RR: 18 (23 Nov 2018 05:26) (17 - 18)  SpO2: 96% (23 Nov 2018 05:26) (95% - 96%)                                  CAPILLARY BLOOD GLUCOSE          Vital Signs Last 24 Hrs  T(C): 36.7 (22 Nov 2018 05:22), Max: 36.7 (21 Nov 2018 21:21)  T(F): 98.1 (22 Nov 2018 05:22), Max: 98.1 (21 Nov 2018 21:21)  HR: 94 (22 Nov 2018 05:22) (94 - 97)  BP: 136/80 (22 Nov 2018 05:22) (130/65 - 145/71)  BP(mean): --  RR: 16 (22 Nov 2018 05:22) (16 - 18)  SpO2: 94% (22 Nov 2018 05:22) (94% - 97%)                     PHYSICAL EXAM:    Constitutional: NAD,   HEENT:  conjunctiva clear   Neck: No JVD  Respiratory: CTAB  Cardiovascular: S1 and S2  Gastrointestinal: BS+, soft, NT/ND  Extremities: No peripheral edema no fever/no chills

## 2018-11-23 NOTE — PROGRESS NOTE ADULT - SUBJECTIVE AND OBJECTIVE BOX
PROGRESS NOTE  Patient is a 82y old  Female who presents with a chief complaint of pt is a 81yo female with pmhx of dementia and htn [facility] ems s/p fall. ems reports pt tripped over someone else's walker, pt walks without assistance at baseline. pt found to be febrile on arrival 100.9 Admitted for septic workup and evaluation,send blood and urine cx,serial lactate levels,monitor vitals closley,ivfs hydration,monitor urine output and renal profile,iv abx initiated  pt without complaints. Daughter is at bedside and states patient has a steady gait Found to have DVT and started on full dose AC (23 Nov 2018 08:38)  Chart and available morning labs /imaging are reviewed electronically , urgent issues addressed . More information  is being added upon completion of rounds , when more information is collected and management discussed with consultants , medical staff and social service/case management on the floor     OVERNIGHT  Patient is resting in a bed comfortably .Confused ,poor mentation .No distress noted   No new issues reported by medical staff . All above noted Remains lethargic ,poor po intake   HPI:  pt is a 81yo female with pmhx of dementia and htn [facility] ems s/p fall. ems reports pt tripped over someone else's walker, pt walks without assistance at baseline. pt found to be febrile on arrival 100.9 Admitted for septic workup and evaluation,send blood and urine cx,serial lactate levels,monitor vitals closley,ivfs hydration,monitor urine output and renal profile,iv abx initiated  pt without complaints. Daughter is at bedside and states patient has a steady gait Found to have DVT and started on full dose AC (14 Nov 2018 21:23)    PAST MEDICAL & SURGICAL HISTORY:  Anxiety  HTN (hypertension)  Depression  Dementia  No significant past surgical history      MEDICATIONS  (STANDING):  docusate sodium 100 milliGRAM(s) Oral two times a day  mirtazapine 15 milliGRAM(s) Oral at bedtime  pantoprazole    Tablet 40 milliGRAM(s) Oral before breakfast  risperiDONE   Tablet 1 milliGRAM(s) Oral two times a day  rivaroxaban 15 milliGRAM(s) Oral two times a day  sertraline 25 milliGRAM(s) Oral daily  sertraline 50 milliGRAM(s) Oral daily    MEDICATIONS  (PRN):  LORazepam    Concentrate 0.5 milliGRAM(s) SubLingual every 8 hours PRN Anxiety  OLANZapine Injectable 5 milliGRAM(s) IntraMuscular every 6 hours PRN Acute agitation      OBJECTIVE    T(C): 36.5 (11-23-18 @ 05:26), Max: 37.8 (11-22-18 @ 20:49)  HR: 95 (11-23-18 @ 05:26) (91 - 95)  BP: 137/79 (11-23-18 @ 05:26) (133/83 - 152/79)  RR: 18 (11-23-18 @ 05:26) (17 - 18)  SpO2: 96% (11-23-18 @ 05:26) (95% - 96%)  Wt(kg): --  I&O's Summary        REVIEW OF SYSTEMS:  ROS is unobtainable due to lethargy and confusion     PHYSICAL EXAM:  Appearance: NAD. VS past 24 hrs -as above   HEENT:   Moist oral mucosa. Conjunctiva clear b/l.   Neck : supple  Respiratory: Lungs CTAB.  Gastrointestinal:  Soft, nontender. No rebound. No rigidity. BS present	  Cardiovascular: RRR ,S1S2 present  Neurologic: Non-focal. Moving all extremities.  Extremities: No edema. No erythema. No calf tenderness.  Skin: No rashes, No ecchymoses, No cyanosis.	  wounds ,skin lesions-See skin assesment flow sheet   LABS:          CAPILLARY BLOOD GLUCOSE              Culture - Blood (collected 14 Nov 2018 22:10)  Source: .Blood Blood-Peripheral  Final Report (19 Nov 2018 23:01):    No growth at 5 days.    Culture - Blood (collected 14 Nov 2018 22:10)  Source: .Blood Blood-Peripheral  Final Report (19 Nov 2018 23:01):    No growth at 5 days.    Culture - Urine (collected 14 Nov 2018 21:51)  Source: .Urine Catheterized  Final Report (15 Nov 2018 22:04):    No growth      RADIOLOGY & ADDITIONAL TESTS:   reviewed elctronically  ASSESSMENT/PLAN:

## 2018-11-23 NOTE — PROGRESS NOTE ADULT - NSHPATTENDINGPLANDISCUSS_GEN_ALL_CORE
MED STAFF  ,   , .  Family requests [facility] placement and Social service is aware .Discharge when the bed is available .

## 2018-11-23 NOTE — PROGRESS NOTE ADULT - SUBJECTIVE AND OBJECTIVE BOX
Patient is a 82y Female with a known history of :  Delirium (R41.0)  ANNY (acute kidney injury) (N17.9)  Prophylactic measure (Z29.9)  Anxiety (F41.9)  Dementia (F03.90)  Depression (F32.9)  HTN (hypertension) (I10)  PNA (pneumonia) (J18.9)  DVT (deep venous thrombosis) (I82.409)  Fall (W19.XXXA)  Suspected deep vein thrombosis ([id number])    HPI:  pt is a 81yo female with pmhx of dementia and htn [facility] ems s/p fall. ems reports pt tripped over someone else's walker, pt walks without assistance at baseline. pt found to be febrile on arrival 100.9 Admitted for septic workup and evaluation,send blood and urine cx,serial lactate levels,monitor vitals closley,ivfs hydration,monitor urine output and renal profile,iv abx initiated  pt without complaints. Daughter is at bedside and states patient has a steady gait Found to have DVT and started on full dose AC (14 Nov 2018 21:23)      REVIEW OF SYSTEMS:    CONSTITUTIONAL: No fever, weight loss, or fatigue  EYES: No eye pain, visual disturbances, or discharge  ENMT:  No difficulty hearing, tinnitus, vertigo; No sinus or throat pain  NECK: No pain or stiffness  BREASTS: No pain, masses, or nipple discharge  RESPIRATORY: No cough, wheezing, chills or hemoptysis; No shortness of breath  CARDIOVASCULAR: No chest pain, palpitations, dizziness, or leg swelling  GASTROINTESTINAL: No abdominal or epigastric pain. No nausea, vomiting, or hematemesis; No diarrhea or constipation. No melena or hematochezia.  GENITOURINARY: No dysuria, frequency, hematuria, or incontinence  NEUROLOGICAL: No headaches, memory loss, loss of strength, numbness, or tremors  SKIN: No itching, burning, rashes, or lesions   LYMPH NODES: No enlarged glands  ENDOCRINE: No heat or cold intolerance; No hair loss  MUSCULOSKELETAL: No joint pain or swelling; No muscle, back, or extremity pain  PSYCHIATRIC: No depression, anxiety, mood swings, or difficulty sleeping  HEME/LYMPH: No easy bruising, or bleeding gums  ALLERGY AND IMMUNOLOGIC: No hives or eczema    MEDICATIONS  (STANDING):  docusate sodium 100 milliGRAM(s) Oral two times a day  mirtazapine 15 milliGRAM(s) Oral at bedtime  pantoprazole    Tablet 40 milliGRAM(s) Oral before breakfast  risperiDONE   Tablet 1 milliGRAM(s) Oral two times a day  rivaroxaban 15 milliGRAM(s) Oral two times a day  sertraline 25 milliGRAM(s) Oral daily  sertraline 50 milliGRAM(s) Oral daily    MEDICATIONS  (PRN):  LORazepam    Concentrate 0.5 milliGRAM(s) SubLingual every 8 hours PRN Anxiety  OLANZapine Injectable 5 milliGRAM(s) IntraMuscular every 6 hours PRN Acute agitation      ALLERGIES: No Known Allergies      FAMILY HISTORY:  No pertinent family history in first degree relatives      PHYSICAL EXAMINATION:  -----------------------------  T(C): 36.5 (11-23-18 @ 05:26), Max: 37.8 (11-22-18 @ 20:49)  HR: 95 (11-23-18 @ 05:26) (91 - 95)  BP: 137/79 (11-23-18 @ 05:26) (133/83 - 152/79)  RR: 18 (11-23-18 @ 05:26) (17 - 18)  SpO2: 96% (11-23-18 @ 05:26) (95% - 96%)  Wt(kg): --        Constitutional: well developed, normal appearance, well groomed, well nourished, no deformities and no acute distress.   Eyes: the conjunctiva exhibited no abnormalities and the eyelids demonstrated no xanthelasmas.   HEENT: normal oral mucosa, no oral pallor and no oral cyanosis.   Neck: normal jugular venous A waves present, normal jugular venous V waves present and no jugular venous lao A waves.   Pulmonary: no respiratory distress, normal respiratory rhythm and effort, no accessory muscle use and lungs were clear to auscultation bilaterally.   Cardiovascular: heart rate and rhythm were normal, normal S1 and S2 and no murmur, gallop, rub, heave or thrill are present.   Abdomen: soft, non-tender, no hepato-splenomegaly and no abdominal mass palpated.   Musculoskeletal: the gait could not be assessed..   Extremities: no clubbing of the fingernails, no localized cyanosis, no petechial hemorrhages and no ischemic changes.   Skin: normal skin color and pigmentation, no rash, no venous stasis, no skin lesions, no skin ulcer and no xanthoma was observed.   Psychiatric: oriented to person, place, and time, the affect was normal, the mood was normal and not feeling anxious.     LABS:   --------                     RADIOLOGY:  -----------------        ECG:

## 2018-11-24 DIAGNOSIS — K59.00 CONSTIPATION, UNSPECIFIED: ICD-10-CM

## 2018-11-24 PROCEDURE — 74018 RADEX ABDOMEN 1 VIEW: CPT | Mod: 26

## 2018-11-24 RX ORDER — POLYETHYLENE GLYCOL 3350 17 G/17G
17 POWDER, FOR SOLUTION ORAL DAILY
Qty: 0 | Refills: 0 | Status: DISCONTINUED | OUTPATIENT
Start: 2018-11-24 | End: 2018-11-30

## 2018-11-24 RX ADMIN — POLYETHYLENE GLYCOL 3350 17 GRAM(S): 17 POWDER, FOR SOLUTION ORAL at 12:04

## 2018-11-24 RX ADMIN — MIRTAZAPINE 15 MILLIGRAM(S): 45 TABLET, ORALLY DISINTEGRATING ORAL at 22:05

## 2018-11-24 RX ADMIN — SERTRALINE 50 MILLIGRAM(S): 25 TABLET, FILM COATED ORAL at 12:04

## 2018-11-24 RX ADMIN — PANTOPRAZOLE SODIUM 40 MILLIGRAM(S): 20 TABLET, DELAYED RELEASE ORAL at 05:18

## 2018-11-24 RX ADMIN — Medication 10 MILLIGRAM(S): at 12:04

## 2018-11-24 RX ADMIN — SERTRALINE 25 MILLIGRAM(S): 25 TABLET, FILM COATED ORAL at 12:04

## 2018-11-24 RX ADMIN — RIVAROXABAN 15 MILLIGRAM(S): KIT at 17:45

## 2018-11-24 RX ADMIN — Medication 100 MILLIGRAM(S): at 05:18

## 2018-11-24 RX ADMIN — RIVAROXABAN 15 MILLIGRAM(S): KIT at 05:18

## 2018-11-24 NOTE — PROGRESS NOTE ADULT - SUBJECTIVE AND OBJECTIVE BOX
PROGRESS NOTE  Patient is a 82y old  Female who presents with a chief complaint of pt is a 81yo female with pmhx of dementia and htn [facility] ems s/p fall. ems reports pt tripped over someone else's walker, pt walks without assistance at baseline. pt found to be febrile on arrival 100.9 Admitted for septic workup and evaluation,send blood and urine cx,serial lactate levels,monitor vitals closley,ivfs hydration,monitor urine output and renal profile,iv abx initiated  pt without complaints. Daughter is at bedside and states patient has a steady gait Found to have DVT and started on full dose AC (24 Nov 2018 09:14)  Chart and available morning labs /imaging are reviewed electronically , urgent issues addressed . More information  is being added upon completion of rounds , when more information is collected and management discussed with consultants , medical staff and social service/case management on the floor   OVERNIGHT  No new issues reported by medical staff . All above noted Patient is resting in a bed comfortably .Confused ,poor mentation .No distress noted   No BM reported x 4-5 days and abdominal distention is reported ,abd xray is ordered and bowel regimen administrated   HPI:  pt is a 81yo female with pmhx of dementia and htn [facility] ems s/p fall. ems reports pt tripped over someone else's walker, pt walks without assistance at baseline. pt found to be febrile on arrival 100.9 Admitted for septic workup and evaluation,send blood and urine cx,serial lactate levels,monitor vitals closley,ivfs hydration,monitor urine output and renal profile,iv abx initiated  pt without complaints. Daughter is at bedside and states patient has a steady gait Found to have DVT and started on full dose AC (14 Nov 2018 21:23)  PAST MEDICAL & SURGICAL HISTORY:  Anxiety  HTN (hypertension)  Depression  Dementia  No significant past surgical history  MEDICATIONS  (STANDING):  bisacodyl Suppository 10 milliGRAM(s) Rectal once  docusate sodium 100 milliGRAM(s) Oral two times a day  mirtazapine 15 milliGRAM(s) Oral at bedtime  pantoprazole    Tablet 40 milliGRAM(s) Oral before breakfast  polyethylene glycol 3350 17 Gram(s) Oral daily  rivaroxaban 15 milliGRAM(s) Oral two times a day  sertraline 25 milliGRAM(s) Oral daily  sertraline 50 milliGRAM(s) Oral daily  MEDICATIONS  (PRN):  bisacodyl Suppository 10 milliGRAM(s) Rectal daily PRN Constipation  LORazepam    Concentrate 0.5 milliGRAM(s) SubLingual every 8 hours PRN Anxiety  OLANZapine Injectable 5 milliGRAM(s) IntraMuscular every 6 hours PRN Acute agitation  OBJECTIVE  T(C): 36.6 (11-24-18 @ 04:35), Max: 36.7 (11-23-18 @ 14:41)  HR: 91 (11-24-18 @ 04:35) (86 - 91)  BP: 160/74 (11-24-18 @ 04:35) (142/68 - 160/74)  RR: 18 (11-24-18 @ 04:35) (15 - 18)  SpO2: 96% (11-24-18 @ 04:35) (95% - 96%)  Wt(kg): --  I&O's Summary  REVIEW OF SYSTEMS:  ROS is unobtainable due to lethargy and confusion   PHYSICAL EXAM:  Appearance: NAD. VS past 24 hrs -as above   HEENT:   Moist oral mucosa. Conjunctiva clear b/l.   Neck : supple  Respiratory: Lungs CTAB.  Gastrointestinal:  DISTENDED . No rebound. No rigidity. BS present ,hypoactive 	  Cardiovascular: RRR ,S1S2 present  Neurologic: Non-focal. Moving all extremities.  Extremities: No edema. No erythema. No calf tenderness.  Skin: No rashes, No ecchymoses, No cyanosis.	  wounds ,skin lesions-See skin assesment flow sheet   LABS:  CAPILLARY BLOOD GLUCOSE  Culture - Blood (collected 14 Nov 2018 22:10)  Source: .Blood Blood-Peripheral  Final Report (19 Nov 2018 23:01):    No growth at 5 days.  Culture - Blood (collected 14 Nov 2018 22:10)  Source: .Blood Blood-Peripheral  Final Report (19 Nov 2018 23:01):    No growth at 5 days.  Culture - Urine (collected 14 Nov 2018 21:51)  Source: .Urine Catheterized  Final Report (15 Nov 2018 22:04):    No growth  RADIOLOGY & ADDITIONAL TESTS:   reviewed elctronically  ASSESSMENT/PLAN:

## 2018-11-24 NOTE — PROGRESS NOTE ADULT - SUBJECTIVE AND OBJECTIVE BOX
Patient is a 82y Female with a known history of :  Delirium (R41.0)  ANNY (acute kidney injury) (N17.9)  Prophylactic measure (Z29.9)  Anxiety (F41.9)  Dementia (F03.90)  Depression (F32.9)  HTN (hypertension) (I10)  PNA (pneumonia) (J18.9)  DVT (deep venous thrombosis) (I82.409)  Fall (W19.XXXA)  Suspected deep vein thrombosis ([id number])    HPI:  pt is a 83yo female with pmhx of dementia and htn [facility] ems s/p fall. ems reports pt tripped over someone else's walker, pt walks without assistance at baseline. pt found to be febrile on arrival 100.9 Admitted for septic workup and evaluation,send blood and urine cx,serial lactate levels,monitor vitals closley,ivfs hydration,monitor urine output and renal profile,iv abx initiated  pt without complaints. Daughter is at bedside and states patient has a steady gait Found to have DVT and started on full dose AC (14 Nov 2018 21:23)      REVIEW OF SYSTEMS:    CONSTITUTIONAL: No fever, weight loss, or fatigue  EYES: No eye pain, visual disturbances, or discharge  ENMT:  No difficulty hearing, tinnitus, vertigo; No sinus or throat pain  NECK: No pain or stiffness  BREASTS: No pain, masses, or nipple discharge  RESPIRATORY: No cough, wheezing, chills or hemoptysis; No shortness of breath  CARDIOVASCULAR: No chest pain, palpitations, dizziness, or leg swelling  GASTROINTESTINAL: No abdominal or epigastric pain. No nausea, vomiting, or hematemesis; No diarrhea or constipation. No melena or hematochezia.  GENITOURINARY: No dysuria, frequency, hematuria, or incontinence  NEUROLOGICAL: No headaches, memory loss, loss of strength, numbness, or tremors  SKIN: No itching, burning, rashes, or lesions   LYMPH NODES: No enlarged glands  ENDOCRINE: No heat or cold intolerance; No hair loss  MUSCULOSKELETAL: No joint pain or swelling; No muscle, back, or extremity pain  PSYCHIATRIC: No depression, anxiety, mood swings, or difficulty sleeping  HEME/LYMPH: No easy bruising, or bleeding gums  ALLERGY AND IMMUNOLOGIC: No hives or eczema    MEDICATIONS  (STANDING):  docusate sodium 100 milliGRAM(s) Oral two times a day  mirtazapine 15 milliGRAM(s) Oral at bedtime  pantoprazole    Tablet 40 milliGRAM(s) Oral before breakfast  rivaroxaban 15 milliGRAM(s) Oral two times a day  sertraline 25 milliGRAM(s) Oral daily  sertraline 50 milliGRAM(s) Oral daily    MEDICATIONS  (PRN):  LORazepam    Concentrate 0.5 milliGRAM(s) SubLingual every 8 hours PRN Anxiety  OLANZapine Injectable 5 milliGRAM(s) IntraMuscular every 6 hours PRN Acute agitation      ALLERGIES: No Known Allergies      FAMILY HISTORY:  No pertinent family history in first degree relatives      PHYSICAL EXAMINATION:  -----------------------------  T(C): 36.6 (11-24-18 @ 04:35), Max: 36.7 (11-23-18 @ 14:41)  HR: 91 (11-24-18 @ 04:35) (86 - 91)  BP: 160/74 (11-24-18 @ 04:35) (142/68 - 160/74)  RR: 18 (11-24-18 @ 04:35) (15 - 18)  SpO2: 96% (11-24-18 @ 04:35) (95% - 96%)  Wt(kg): --        Constitutional: well developed, normal appearance, well groomed, well nourished, no deformities and no acute distress.   Eyes: the conjunctiva exhibited no abnormalities and the eyelids demonstrated no xanthelasmas.   HEENT: normal oral mucosa, no oral pallor and no oral cyanosis.   Neck: normal jugular venous A waves present, normal jugular venous V waves present and no jugular venous lao A waves.   Pulmonary: no respiratory distress, normal respiratory rhythm and effort, no accessory muscle use and lungs were clear to auscultation bilaterally.   Cardiovascular: heart rate and rhythm were normal, normal S1 and S2 and no murmur, gallop, rub, heave or thrill are present.   Abdomen: soft, non-tender, no hepato-splenomegaly and no abdominal mass palpated.   Musculoskeletal: the gait could not be assessed..   Extremities: no clubbing of the fingernails, no localized cyanosis, no petechial hemorrhages and no ischemic changes.   Skin: normal skin color and pigmentation, no rash, no venous stasis, no skin lesions, no skin ulcer and no xanthoma was observed.   Psychiatric: oriented to person, place, and time, the affect was normal, the mood was normal and not feeling anxious.     LABS:   --------                     RADIOLOGY:  -----------------        ECG:

## 2018-11-24 NOTE — PROGRESS NOTE ADULT - NSHPATTENDINGPLANDISCUSS_GEN_ALL_CORE
MED STAFF  ,   , .  Family requests [facility] placement and Social service is aware .Discharge when the bed is available .

## 2018-11-24 NOTE — PROGRESS NOTE ADULT - SUBJECTIVE AND OBJECTIVE BOX
Neurology Follow up note    LYUBOV [id number]    HPI:  pt is a 81yo female with pmhx of dementia and htn [facility] ems s/p fall. ems reports pt tripped over someone else's walker, pt walks without assistance at baseline. pt found to be febrile on arrival 100.9 Admitted for septic workup and evaluation,send blood and urine cx,serial lactate levels,monitor vitals closley,ivfs hydration,monitor urine output and renal profile,iv abx initiated  pt without complaints. Daughter is at bedside and states patient has a steady gait Found to have DVT and started on full dose AC (14 Nov 2018 21:23)      Interval History - no new events.    Patient is seen, chart was reviewed and case was discussed with the treatment team.  Pt is not in any distress.   Lying on bed comfortably.   No events reported overnight.   No clinical seizure was reported.  Sitting on chair bed comfortably.    is at bedside.    Vital Signs Last 24 Hrs  T(C): 36.6 (24 Nov 2018 04:35), Max: 36.7 (23 Nov 2018 14:41)  T(F): 97.9 (24 Nov 2018 04:35), Max: 98.1 (23 Nov 2018 14:41)  HR: 91 (24 Nov 2018 04:35) (86 - 91)  BP: 160/74 (24 Nov 2018 04:35) (142/68 - 160/74)  BP(mean): --  RR: 18 (24 Nov 2018 04:35) (15 - 18)  SpO2: 96% (24 Nov 2018 04:35) (95% - 96%)            On Neurological Examination:    Mental Status - Pt is alert, awake but confused.     Speech - dysarthria.    Cranial Nerves - Pupils 3 mm equal and reactive to light, extraocular eye movements intact. Pt has no visual field deficit.  Pt has no  facial asymmetry. Facial sensation is intact.  Tongue - is in midline.    Muscle tone - is normal      Motor Exam - 5/5 of UE.  LE 4/5.    Sensory Exam -  Pt withdraws all extremities equally on stimulation. No asymmetry seen. No complaints of tingling, numbness.        coordination:    Finger to nose: normal    Deep tendon Reflexes - 2 plus all over.    Neck Supple -  Yes.     MEDICATIONS    bisacodyl Suppository 10 milliGRAM(s) Rectal daily PRN  docusate sodium 100 milliGRAM(s) Oral two times a day  LORazepam    Concentrate 0.5 milliGRAM(s) SubLingual every 8 hours PRN  mirtazapine 15 milliGRAM(s) Oral at bedtime  OLANZapine Injectable 5 milliGRAM(s) IntraMuscular every 6 hours PRN  pantoprazole    Tablet 40 milliGRAM(s) Oral before breakfast  polyethylene glycol 3350 17 Gram(s) Oral daily  rivaroxaban 15 milliGRAM(s) Oral two times a day  sertraline 25 milliGRAM(s) Oral daily  sertraline 50 milliGRAM(s) Oral daily      Allergies    No Known Allergies    Intolerances        LABS:            Hemoglobin A1C:     Vitamin B12     RADIOLOGY    ASSESSMENT AND PLAN:      AMS.  SEPSIS.  DEPRESSION .    ANTIBIOTIC.  ZYPREXIA PRN FOR AGITATION.  Physical therapy evaluation.  Pain is accessed and addressed.  Would continue to follow.

## 2018-11-24 NOTE — PROGRESS NOTE ADULT - SUBJECTIVE AND OBJECTIVE BOX
Adriana Swanson [facility] P   ALLERGY nka  CONTACT Dtr Tameka Campos  Self   REASON FOR VISIT Subsequent Pulmonary followup  Initial evaluation/Pulmonary consultation requested 11/14/2018 by Dr Olivares from Dr Khalil   Patient examined chart reviewed  HOSPITAL ADMISSION [facility]  P 11/14/2018   PATIENT CAME  FROM (if information available)      VITALS/LABS                           11/24/2018 afeb 92 145/77 18 94%   11/23/2018 W 6.9 Hb 10.4 Plt 202 Na 142 K 3.3 CP2 23 Cr .7     REVIEW OF SYMPTOMS    Able to give ROS  NO     PHYSICAL EXAM    HEENT Unremarkable PERRLA atraumatic   RESP Fair air entry EXP prolonged    Harsh breath sound Resp distres mild   CARDIAC S1 S2 No S3     NO JVD    ABDOMEN SOFT BS PRESENT NOT DISTENDED No hepatosplenomegaly PEDAL EDEMA present No calf tenderness  NO rash   GENERAL Not TOXIC looking    GLOBAL ISSUE/BEST PRACTICE:      PROBLEM: HOB elevation:   y            PROBLEM: Stress ulcer proph:    protonix 40 911/14)                       PROBLEM: VTE prophylaxis:      lvnx 50.2 (11/14) ? Rivaroxaban 15.2 (11/17)  PROBLEM: Glycemic control:    na  PROBLEM: Nutrition:    watkins (11/14)   PROBLEM: Advanced directive: na     PROBLEM: Allergies:  na  EVENT 11/17/2018 DW fam in detail re pros and cons of doac v vka v ivcf   EVENT AGITATION 11/14/2018 Constant observation     PATIENT MANAGEMENT   11/14/2018 ADMISSION [facility] P DR LEVY TY   11/14/2018 83yo female with pmhx of dementia and htn [facility] ems s/p fall. ems reports pt tripped over someone else's walker, pt walks without assistance at baseline. pt found to be febrile on arrival 100.9. pt without complaints.   pmd: irving    Pt was found to have DVT and was started on full dose lovenox 11/14 She was also found to have patchy pl based opacity rml Case was dw IR and Dr Farah felt that ctnb should not be done but a repeat CT ch should be done in 2 m Dr Kaiser was consulted 11/17/2018 to decide re ivcf vs Xarelto and this was dw family in detail on 11/17/2018   Pt was seen by Dr Kaiser and fam declined IVCF which was recommended     ASSESSMENT/RECOMMENDATIONS  11/14/2018 ADMISSION [facility] P DR LEVY TY    DVT   11/18/2018 Seen by Dr Kaiser ivcf recommended but daughter Mrs Campos declined   11/17/2018 Started on Xarelto 15.2 (11/17)   11/16/2018 DW Dr Ty and with family Pt is steady on her feet and this was a trip fall Pros and cons of doac explained to pt family Xarelto agreed upon by Dr Ty   11/14/2018 V duplex R leg DVT     ELEVATED CREAT   Monitored serially Improved     PATCHY OPACITY RML ON CT CHEST DONE 11/14  This could be a pulmonary infarct Will dw IR to see if ctnb is feasible Will need serial followup and will need PET after DC   11/16/2018 I dw Dr Farah He feels that instead of ctnb we should rep[eat CT Chest in 2 m Message sent to Dr Ty   11/24/2018 To take Xareltyo 15.2 x 21 d then Xarelto 20    PNEUMONIA   Bacterial pneumonia unlikely in absence of procalcitonin leukocytosis and fever  DCed abio (11/15/2018)   11/14 bc n 11/14 uc n     Awaits placement    TIME SPENT Over 25 minutes aggregate care time spent on encounter; activities included   direct patient care, counseling and/or coordinating care reviewing notes, lab data/ imaging , discussion with multidisciplinary team/ patient  /family. Risks, benefits, alternatives  discussed in detail.

## 2018-11-25 RX ORDER — ACETAMINOPHEN 500 MG
650 TABLET ORAL EVERY 6 HOURS
Qty: 0 | Refills: 0 | Status: DISCONTINUED | OUTPATIENT
Start: 2018-11-25 | End: 2018-11-30

## 2018-11-25 RX ORDER — MORPHINE SULFATE 50 MG/1
2.5 CAPSULE, EXTENDED RELEASE ORAL EVERY 6 HOURS
Qty: 0 | Refills: 0 | Status: DISCONTINUED | OUTPATIENT
Start: 2018-11-25 | End: 2018-11-30

## 2018-11-25 RX ADMIN — Medication 100 MILLIGRAM(S): at 06:10

## 2018-11-25 RX ADMIN — RIVAROXABAN 15 MILLIGRAM(S): KIT at 06:10

## 2018-11-25 RX ADMIN — SERTRALINE 25 MILLIGRAM(S): 25 TABLET, FILM COATED ORAL at 11:38

## 2018-11-25 RX ADMIN — SERTRALINE 50 MILLIGRAM(S): 25 TABLET, FILM COATED ORAL at 11:38

## 2018-11-25 RX ADMIN — PANTOPRAZOLE SODIUM 40 MILLIGRAM(S): 20 TABLET, DELAYED RELEASE ORAL at 06:11

## 2018-11-25 RX ADMIN — MIRTAZAPINE 15 MILLIGRAM(S): 45 TABLET, ORALLY DISINTEGRATING ORAL at 21:57

## 2018-11-25 NOTE — PROGRESS NOTE ADULT - SUBJECTIVE AND OBJECTIVE BOX
Patient is a 82y Female with a known history of :  Constipation (K59.00)  Delirium (R41.0)  ANNY (acute kidney injury) (N17.9)  Prophylactic measure (Z29.9)  Anxiety (F41.9)  Dementia (F03.90)  Depression (F32.9)  HTN (hypertension) (I10)  PNA (pneumonia) (J18.9)  DVT (deep venous thrombosis) (I82.409)  Fall (W19.XXXA)  Suspected deep vein thrombosis ([id number])    HPI:  pt is a 83yo female with pmhx of dementia and htn [facility] ems s/p fall. ems reports pt tripped over someone else's walker, pt walks without assistance at baseline. pt found to be febrile on arrival 100.9 Admitted for septic workup and evaluation,send blood and urine cx,serial lactate levels,monitor vitals closley,ivfs hydration,monitor urine output and renal profile,iv abx initiated  pt without complaints. Daughter is at bedside and states patient has a steady gait Found to have DVT and started on full dose AC (14 Nov 2018 21:23)      REVIEW OF SYSTEMS:    CONSTITUTIONAL: No fever, weight loss, or fatigue  EYES: No eye pain, visual disturbances, or discharge  ENMT:  No difficulty hearing, tinnitus, vertigo; No sinus or throat pain  NECK: No pain or stiffness  BREASTS: No pain, masses, or nipple discharge  RESPIRATORY: No cough, wheezing, chills or hemoptysis; No shortness of breath  CARDIOVASCULAR: No chest pain, palpitations, dizziness, or leg swelling  GASTROINTESTINAL: No abdominal or epigastric pain. No nausea, vomiting, or hematemesis; No diarrhea or constipation. No melena or hematochezia.  GENITOURINARY: No dysuria, frequency, hematuria, or incontinence  NEUROLOGICAL: No headaches, memory loss, loss of strength, numbness, or tremors  SKIN: No itching, burning, rashes, or lesions   LYMPH NODES: No enlarged glands  ENDOCRINE: No heat or cold intolerance; No hair loss  MUSCULOSKELETAL: No joint pain or swelling; No muscle, back, or extremity pain  PSYCHIATRIC: No depression, anxiety, mood swings, or difficulty sleeping  HEME/LYMPH: No easy bruising, or bleeding gums  ALLERGY AND IMMUNOLOGIC: No hives or eczema    MEDICATIONS  (STANDING):  docusate sodium 100 milliGRAM(s) Oral two times a day  mirtazapine 15 milliGRAM(s) Oral at bedtime  pantoprazole    Tablet 40 milliGRAM(s) Oral before breakfast  polyethylene glycol 3350 17 Gram(s) Oral daily  rivaroxaban 15 milliGRAM(s) Oral two times a day  sertraline 25 milliGRAM(s) Oral daily  sertraline 50 milliGRAM(s) Oral daily    MEDICATIONS  (PRN):  bisacodyl Suppository 10 milliGRAM(s) Rectal daily PRN Constipation  LORazepam    Concentrate 0.5 milliGRAM(s) SubLingual every 8 hours PRN Anxiety  OLANZapine Injectable 5 milliGRAM(s) IntraMuscular every 6 hours PRN Acute agitation      ALLERGIES: No Known Allergies      FAMILY HISTORY:  No pertinent family history in first degree relatives      PHYSICAL EXAMINATION:  -----------------------------  T(C): 36.7 (11-25-18 @ 04:57), Max: 36.9 (11-24-18 @ 20:45)  HR: 85 (11-25-18 @ 04:57) (85 - 99)  BP: 136/75 (11-25-18 @ 04:57) (130/74 - 145/77)  RR: 16 (11-25-18 @ 04:57) (16 - 18)  SpO2: 98% (11-25-18 @ 04:57) (94% - 98%)  Wt(kg): --        Constitutional: well developed, normal appearance, well groomed, well nourished, no deformities and no acute distress.   Eyes: the conjunctiva exhibited no abnormalities and the eyelids demonstrated no xanthelasmas.   HEENT: normal oral mucosa, no oral pallor and no oral cyanosis.   Neck: normal jugular venous A waves present, normal jugular venous V waves present and no jugular venous lao A waves.   Pulmonary: no respiratory distress, normal respiratory rhythm and effort, no accessory muscle use and lungs were clear to auscultation bilaterally.   Cardiovascular: heart rate and rhythm were normal, normal S1 and S2 and no murmur, gallop, rub, heave or thrill are present.   Abdomen: soft, non-tender, no hepato-splenomegaly and no abdominal mass palpated.   Musculoskeletal: the gait could not be assessed..   Extremities: no clubbing of the fingernails, no localized cyanosis, no petechial hemorrhages and no ischemic changes.   Skin: normal skin color and pigmentation, no rash, no venous stasis, no skin lesions, no skin ulcer and no xanthoma was observed.   Psychiatric: oriented to person, place, and time, the affect was normal, the mood was normal and not feeling anxious.     LABS:   --------                     RADIOLOGY:  -----------------        ECG:

## 2018-11-25 NOTE — PROGRESS NOTE ADULT - SUBJECTIVE AND OBJECTIVE BOX
PROGRESS NOTE  Patient is a 82y old  Female who presents with a chief complaint of pt is a 83yo female with pmhx of dementia and htn [facility] ems s/p fall. ems reports pt tripped over someone else's walker, pt walks without assistance at baseline. pt found to be febrile on arrival 100.9 Admitted for septic workup and evaluation,send blood and urine cx,serial lactate levels,monitor vitals closley,ivfs hydration,monitor urine output and renal profile,iv abx initiated  pt without complaints. Daughter is at bedside and states patient has a steady gait Found to have DVT and started on full dose AC (25 Nov 2018 09:38)  Chart and available morning labs /imaging are reviewed electronically , urgent issues addressed . More information  is being added upon completion of rounds , when more information is collected and management discussed with consultants , medical staff and social service/case management on the floor   OVERNIGHT  No new issues reported by medical staff . All above noted Patient is resting in a bed comfortably .Confused ,poor mentation .No distress noted   HPI:  pt is a 83yo female with pmhx of dementia and htn [facility] ems s/p fall. ems reports pt tripped over someone else's walker, pt walks without assistance at baseline. pt found to be febrile on arrival 100.9 Admitted for septic workup and evaluation,send blood and urine cx,serial lactate levels,monitor vitals closley,ivfs hydration,monitor urine output and renal profile,iv abx initiated  pt without complaints. Daughter is at bedside and states patient has a steady gait Found to have DVT and started on full dose AC (14 Nov 2018 21:23)  PAST MEDICAL & SURGICAL HISTORY:  Anxiety  HTN (hypertension)  Depression  Dementia  No significant past surgical history  MEDICATIONS  (STANDING):  docusate sodium 100 milliGRAM(s) Oral two times a day  mirtazapine 15 milliGRAM(s) Oral at bedtime  pantoprazole    Tablet 40 milliGRAM(s) Oral before breakfast  polyethylene glycol 3350 17 Gram(s) Oral daily  rivaroxaban 15 milliGRAM(s) Oral two times a day  sertraline 25 milliGRAM(s) Oral daily  sertraline 50 milliGRAM(s) Oral daily  MEDICATIONS  (PRN):  bisacodyl Suppository 10 milliGRAM(s) Rectal daily PRN Constipation  LORazepam    Concentrate 0.5 milliGRAM(s) SubLingual every 8 hours PRN Anxiety  OLANZapine Injectable 5 milliGRAM(s) IntraMuscular every 6 hours PRN Acute agitation  OBJECTIVE  T(C): 36.7 (11-25-18 @ 04:57), Max: 36.9 (11-24-18 @ 20:45)  HR: 85 (11-25-18 @ 04:57) (85 - 99)  BP: 136/75 (11-25-18 @ 04:57) (130/74 - 145/77)  RR: 16 (11-25-18 @ 04:57) (16 - 18)  SpO2: 98% (11-25-18 @ 04:57) (94% - 98%)  Wt(kg): --  I&O's Summary  REVIEW OF SYSTEMS:  PHYSICAL EXAM:  Appearance: NAD. VS past 24 hrs -as above   HEENT:   Moist oral mucosa. Conjunctiva clear b/l.   Neck : supple  Respiratory: Lungs CTAB.  Gastrointestinal:  Soft, nontender. No rebound. No rigidity. BS present	  Cardiovascular: RRR ,S1S2 present  Neurologic: Non-focal. Moving all extremities.  Extremities: No edema. No erythema. No calf tenderness.  Skin: No rashes, No ecchymoses, No cyanosis.	  wounds ,skin lesions-See skin assesment flow sheet   LABS:  CAPILLARY BLOOD GLUCOSE  RADIOLOGY & ADDITIONAL TESTS:< from: Xray Abdomen 1 View PORTABLE -Routine (11.24.18 @ 11:15) >  EXAM:  XR ABDOMEN PORTABLE ROUTINE 1V                        PROCEDURE DATE:  11/24/2018    INTERPRETATION:  History:Abdominal distention.  Technique: Portable x-ray abdomen  Comparison: None   Findings: Moderate amount of stool in the rectum. No dilated loops of   small bowel. No gross free air. Limited evaluation without upright film.   Degenerative changes of the spine. Bowel gas overlies and obscures   portions of the sacrum and iliac bone.  Impression: No plain film evidence of obstruction  < end of copied text >   reviewed elctronically  ASSESSMENT/PLAN:

## 2018-11-25 NOTE — PROGRESS NOTE ADULT - SUBJECTIVE AND OBJECTIVE BOX
Adriana Swanson [facility] P   ALLERGY nka  CONTACT Dtr Tameka Campos  Self   REASON FOR VISIT Subsequent Pulmonary followup  Initial evaluation/Pulmonary consultation requested 11/14/2018 by Dr Olivares from Dr Khalil   Patient examined chart reviewed  HOSPITAL ADMISSION [facility]  P 11/14/2018   PATIENT CAME  FROM (if information available)      VITALS/LABS                           11/25/2018 afeb 85 130/75 16 98%   11/23/2018 W 6.9 Hb 10.4 Plt 202 Na 142 K 3.3 CP2 23 Cr .7     REVIEW OF SYMPTOMS    Able to give ROS  NO     PHYSICAL EXAM    HEENT Unremarkable PERRLA atraumatic   RESP Fair air entry EXP prolonged    Harsh breath sound Resp distres mild   CARDIAC S1 S2 No S3     NO JVD    ABDOMEN SOFT BS PRESENT NOT DISTENDED No hepatosplenomegaly PEDAL EDEMA present No calf tenderness  NO rash   GENERAL Not TOXIC looking    GLOBAL ISSUE/BEST PRACTICE:      PROBLEM: HOB elevation:   y            PROBLEM: Stress ulcer proph:    protonix 40 911/14)                       PROBLEM: VTE prophylaxis:      lvnx 50.2 (11/14) ? Rivaroxaban 15.2 (11/17)  PROBLEM: Glycemic control:    na  PROBLEM: Nutrition:    watkins (11/14)   PROBLEM: Advanced directive: na     PROBLEM: Allergies:  na  EVENT 11/17/2018 DW fam in detail re pros and cons of doac v vka v ivcf   EVENT AGITATION 11/14/2018 Constant observation     PATIENT MANAGEMENT   11/14/2018 ADMISSION [facility] P DR LEVY TY   11/14/2018 83yo female with pmhx of dementia and htn [facility] ems s/p fall. ems reports pt tripped over someone else's walker, pt walks without assistance at baseline. pt found to be febrile on arrival 100.9. pt without complaints.   pmd: irving    Pt was found to have DVT and was started on full dose lovenox 11/14 She was also found to have patchy pl based opacity rml Case was dw IR and Dr Farah felt that ctnb should not be done but a repeat CT ch should be done in 2 m Dr Kaiser was consulted 11/17/2018 to decide re ivcf vs Xarelto and this was dw family in detail on 11/17/2018   Pt was seen by Dr Kaiser and fam declined IVCF which was recommended   Pt was started on Xarelto    ASSESSMENT/RECOMMENDATIONS  11/14/2018 ADMISSION [facility] P DR LEVY TY    DVT   11/18/2018 Seen by Dr Kaiser ivcf recommended but daughter Mrs Campos declined   11/17/2018 Started on Xarelto 15.2 (11/17)   11/16/2018 DW Dr Ty and with family Pt is steady on her feet and this was a trip fall Pros and cons of doac explained to pt family Xarelto agreed upon by Dr Ty   11/14/2018 V duplex R leg DVT     ELEVATED CREAT   Monitored serially Improved     PATCHY OPACITY RML ON CT CHEST DONE 11/14  This could be a pulmonary infarct Will dw IR to see if ctnb is feasible Will need serial followup and will need PET after DC   11/16/2018 I roldan Farah He feels that instead of ctnb we should rep[eat CT Chest in 2 m Message sent to Dr Ty   11/24/2018 To take Xareltyo 15.2 x 21 d then Xarelto 20    PNEUMONIA   Bacterial pneumonia unlikely in absence of procalcitonin leukocytosis and fever  DCed abio (11/15/2018)   11/14 bc n 11/14 uc n     Awaits placement    TIME SPENT Over 25 minutes aggregate care time spent on encounter; activities included   direct patient care, counseling and/or coordinating care reviewing notes, lab data/ imaging , discussion with multidisciplinary team/ patient  /family. Risks, benefits, alternatives  discussed in detail.

## 2018-11-25 NOTE — PROGRESS NOTE ADULT - SUBJECTIVE AND OBJECTIVE BOX
Neurology Follow up note    LYUBOV [id number]    HPI:  pt is a 81yo female with pmhx of dementia and htn [facility] ems s/p fall. ems reports pt tripped over someone else's walker, pt walks without assistance at baseline. pt found to be febrile on arrival 100.9 Admitted for septic workup and evaluation,send blood and urine cx,serial lactate levels,monitor vitals closley,ivfs hydration,monitor urine output and renal profile,iv abx initiated  pt without complaints. Daughter is at bedside and states patient has a steady gait Found to have DVT and started on full dose AC (14 Nov 2018 21:23)      Interval History - still confused,    Patient is seen, chart was reviewed and case was discussed with the treatment team.  Pt is not in any distress.   Lying on bed comfortably.   No events reported overnight.   No clinical seizure was reported.  Sitting on chair bed comfortably.    is at bedside.    Vital Signs Last 24 Hrs  T(C): 36.7 (25 Nov 2018 04:57), Max: 36.9 (24 Nov 2018 20:45)  T(F): 98.1 (25 Nov 2018 04:57), Max: 98.4 (24 Nov 2018 20:45)  HR: 85 (25 Nov 2018 04:57) (85 - 92)  BP: 136/75 (25 Nov 2018 04:57) (136/75 - 145/77)  BP(mean): --  RR: 16 (25 Nov 2018 04:57) (16 - 18)  SpO2: 98% (25 Nov 2018 04:57) (94% - 98%)            On Neurological Examination:    Mental Status - Pt is alert, awake but confused.     Speech - dysarthria.    Cranial Nerves - Pupils 3 mm equal and reactive to light, extraocular eye movements intact. Pt has no visual field deficit.  Pt has no  facial asymmetry. Facial sensation is intact.  Tongue - is in midline.    Muscle tone - is normal      Motor Exam - 5/5 of UE.  LE 4/5.    Sensory Exam -  Pt withdraws all extremities equally on stimulation. No asymmetry seen. No complaints of tingling, numbness.        coordination:    Finger to nose: normal    Deep tendon Reflexes - 2 plus all over.    Neck Supple -  Yes.     MEDICATIONS    bisacodyl Suppository 10 milliGRAM(s) Rectal daily PRN  docusate sodium 100 milliGRAM(s) Oral two times a day  LORazepam    Concentrate 0.5 milliGRAM(s) SubLingual every 8 hours PRN  mirtazapine 15 milliGRAM(s) Oral at bedtime  OLANZapine Injectable 5 milliGRAM(s) IntraMuscular every 6 hours PRN  pantoprazole    Tablet 40 milliGRAM(s) Oral before breakfast  polyethylene glycol 3350 17 Gram(s) Oral daily  rivaroxaban 15 milliGRAM(s) Oral two times a day  sertraline 25 milliGRAM(s) Oral daily  sertraline 50 milliGRAM(s) Oral daily      Allergies    No Known Allergies    Intolerances        LABS:            Hemoglobin A1C:     Vitamin B12     RADIOLOGY    ASSESSMENT AND PLAN:      AMS.  DEMENTIA WITH BEHAVIORAL DISTURBANCE,  SP FALL.  SEPSIS.  DEPRESSION .    ANTIBIOTIC AS MEDICAL TEAM.  ZYPREXIA PRN FOR AGITATION.  Physical therapy evaluation.  Pain is accessed and addressed.  Would continue to follow.

## 2018-11-26 LAB
ANION GAP SERPL CALC-SCNC: 9 MMOL/L — SIGNIFICANT CHANGE UP (ref 5–17)
BUN SERPL-MCNC: 69 MG/DL — HIGH (ref 7–23)
CALCIUM SERPL-MCNC: 9.8 MG/DL — SIGNIFICANT CHANGE UP (ref 8.5–10.1)
CHLORIDE SERPL-SCNC: 116 MMOL/L — HIGH (ref 96–108)
CO2 SERPL-SCNC: 29 MMOL/L — SIGNIFICANT CHANGE UP (ref 22–31)
CREAT SERPL-MCNC: 2.1 MG/DL — HIGH (ref 0.5–1.3)
GLUCOSE SERPL-MCNC: 127 MG/DL — HIGH (ref 70–99)
HCT VFR BLD CALC: 37.9 % — SIGNIFICANT CHANGE UP (ref 34.5–45)
HGB BLD-MCNC: 11.7 G/DL — SIGNIFICANT CHANGE UP (ref 11.5–15.5)
MCHC RBC-ENTMCNC: 30.9 GM/DL — LOW (ref 32–36)
MCHC RBC-ENTMCNC: 33 PG — SIGNIFICANT CHANGE UP (ref 27–34)
MCV RBC AUTO: 106.8 FL — HIGH (ref 80–100)
NRBC # BLD: 0 /100 WBCS — SIGNIFICANT CHANGE UP (ref 0–0)
PLATELET # BLD AUTO: 353 K/UL — SIGNIFICANT CHANGE UP (ref 150–400)
POTASSIUM SERPL-MCNC: 3.8 MMOL/L — SIGNIFICANT CHANGE UP (ref 3.5–5.3)
POTASSIUM SERPL-SCNC: 3.8 MMOL/L — SIGNIFICANT CHANGE UP (ref 3.5–5.3)
RBC # BLD: 3.55 M/UL — LOW (ref 3.8–5.2)
RBC # FLD: 13.5 % — SIGNIFICANT CHANGE UP (ref 10.3–14.5)
SODIUM SERPL-SCNC: 154 MMOL/L — HIGH (ref 135–145)
WBC # BLD: 12.89 K/UL — HIGH (ref 3.8–10.5)
WBC # FLD AUTO: 12.89 K/UL — HIGH (ref 3.8–10.5)

## 2018-11-26 RX ORDER — ENOXAPARIN SODIUM 100 MG/ML
50 INJECTION SUBCUTANEOUS EVERY 24 HOURS
Qty: 0 | Refills: 0 | Status: DISCONTINUED | OUTPATIENT
Start: 2018-11-26 | End: 2018-11-26

## 2018-11-26 RX ORDER — ENOXAPARIN SODIUM 100 MG/ML
50 INJECTION SUBCUTANEOUS EVERY 24 HOURS
Qty: 0 | Refills: 0 | Status: DISCONTINUED | OUTPATIENT
Start: 2018-11-26 | End: 2018-11-30

## 2018-11-26 RX ADMIN — ENOXAPARIN SODIUM 50 MILLIGRAM(S): 100 INJECTION SUBCUTANEOUS at 17:54

## 2018-11-26 RX ADMIN — POLYETHYLENE GLYCOL 3350 17 GRAM(S): 17 POWDER, FOR SOLUTION ORAL at 11:56

## 2018-11-26 RX ADMIN — MIRTAZAPINE 15 MILLIGRAM(S): 45 TABLET, ORALLY DISINTEGRATING ORAL at 22:46

## 2018-11-26 RX ADMIN — SERTRALINE 50 MILLIGRAM(S): 25 TABLET, FILM COATED ORAL at 11:56

## 2018-11-26 RX ADMIN — SERTRALINE 25 MILLIGRAM(S): 25 TABLET, FILM COATED ORAL at 11:56

## 2018-11-26 NOTE — PROGRESS NOTE ADULT - SUBJECTIVE AND OBJECTIVE BOX
Neurology follow up note    LYUBOV [id number]      Interval History:    Patient resting in bed    MEDICATIONS    acetaminophen   Tablet .. 650 milliGRAM(s) Oral every 6 hours PRN  bisacodyl Suppository 10 milliGRAM(s) Rectal daily PRN  docusate sodium 100 milliGRAM(s) Oral two times a day  LORazepam    Concentrate 0.5 milliGRAM(s) SubLingual every 8 hours PRN  mirtazapine 15 milliGRAM(s) Oral at bedtime  morphine Concentrate 2.5 milliGRAM(s) SubLingual every 6 hours PRN  OLANZapine Injectable 5 milliGRAM(s) IntraMuscular every 6 hours PRN  pantoprazole    Tablet 40 milliGRAM(s) Oral before breakfast  polyethylene glycol 3350 17 Gram(s) Oral daily  rivaroxaban 15 milliGRAM(s) Oral two times a day  sertraline 25 milliGRAM(s) Oral daily  sertraline 50 milliGRAM(s) Oral daily      Allergies    No Known Allergies    Intolerances            Vital Signs Last 24 Hrs  T(C): 36.9 (26 Nov 2018 04:15), Max: 36.9 (26 Nov 2018 04:15)  T(F): 98.4 (26 Nov 2018 04:15), Max: 98.4 (26 Nov 2018 04:15)  HR: 96 (26 Nov 2018 04:15) (96 - 96)  BP: 143/86 (26 Nov 2018 04:15) (143/86 - 143/86)  BP(mean): --  RR: 18 (26 Nov 2018 04:15) (18 - 18)  SpO2: 97% (26 Nov 2018 04:15) (97% - 97%)      REVIEW OF SYSTEMS:  Extremely limited secondary to the patient's baseline that she says one to two words and occasionally will articulate.    PHYSICAL EXAMINATION:     HEENT:  Head:  Positive trauma was noted over the left eye.    Eyes:  No scleral icterus.    Ears:  Hearing hard to evaluate.    NECK:  Supple.    RESPIRATORY:  Decreased breath sounds bilaterally.    CARDIOVASCULAR:  S1 and S2 are heard.    ABDOMEN:  Soft and nontender.    EXTREMITIES:  No clubbing or cyanosis were noted.      NEUROLOGIC:  The patient is awake and alert.    Extraocular movements were intact.  Positive blink to bilateral visual threat.    The patient would mumble, occasionally would say one word, was off and on following commands.  Motor:  With stimuli, did move all four extremities.  No focality was noted.    Tone:  Bilateral upper and lower was increased.               LABS:  CBC Full  -  ( 26 Nov 2018 09:11 )  WBC Count : 12.89 K/uL  Hemoglobin : 11.7 g/dL  Hematocrit : 37.9 %  Platelet Count - Automated : 353 K/uL  Mean Cell Volume : 106.8 fl  Mean Cell Hemoglobin : 33.0 pg  Mean Cell Hemoglobin Concentration : 30.9 gm/dL  Auto Neutrophil # : x  Auto Lymphocyte # : x  Auto Monocyte # : x  Auto Eosinophil # : x  Auto Basophil # : x  Auto Neutrophil % : x  Auto Lymphocyte % : x  Auto Monocyte % : x  Auto Eosinophil % : x  Auto Basophil % : x      11-26    154<H>  |  116<H>  |  69<H>  ----------------------------<  127<H>  3.8   |  29  |  2.10<H>    Ca    9.8      26 Nov 2018 09:11      Hemoglobin A1C:       Vitamin B12         RADIOLOGY    ANALYSIS AND PLAN:  An 82-year-old with an episode of fall, head trauma, and change in mental status.  1.	For episodes of fall, this appears to be a mechanical fall.  There is no clear history of epilepsy reported.  The examination was limited, but no signs to suggest a new cerebrovascular accident has ensued.  2.	For episode of head trauma, would recommend to continue neuro checks.  3.	For change in mental status, most likely secondary to dementia made worse in the hospital setting.  4.	For history of dementia, secondary to advanced symptoms, do not feel medications would be of any benefit.  The patient did try medications in the past.  5.	Spoke with the daughter, Marychuy, at [phone] 11/23/18  6.	Physical Therapy evaluation.  7.	Fall precautions.  8.	monitor intake  9.	lethargy will dc Risperdal for now  patient more awake  10.	no new events noted   11.	Greater than 30  minutes of time was spent with the patient, plan of care, reviewing data, and speaking to the family and multidisciplinary healthcare team.    Thank you for the courtesy of this consultation.

## 2018-11-26 NOTE — PROGRESS NOTE ADULT - SUBJECTIVE AND OBJECTIVE BOX
Patient is a 82y Female with a known history of :  Constipation (K59.00)  Delirium (R41.0)  ANNY (acute kidney injury) (N17.9)  Prophylactic measure (Z29.9)  Anxiety (F41.9)  Dementia (F03.90)  Depression (F32.9)  HTN (hypertension) (I10)  PNA (pneumonia) (J18.9)  DVT (deep venous thrombosis) (I82.409)  Fall (W19.XXXA)  Suspected deep vein thrombosis ([id number])    HPI:  pt is a 81yo female with pmhx of dementia and htn [facility] ems s/p fall. ems reports pt tripped over someone else's walker, pt walks without assistance at baseline. pt found to be febrile on arrival 100.9 Admitted for septic workup and evaluation,send blood and urine cx,serial lactate levels,monitor vitals closley,ivfs hydration,monitor urine output and renal profile,iv abx initiated  pt without complaints. Daughter is at bedside and states patient has a steady gait Found to have DVT and started on full dose AC (14 Nov 2018 21:23)      REVIEW OF SYSTEMS:    CONSTITUTIONAL: No fever, weight loss, or fatigue  EYES: No eye pain, visual disturbances, or discharge  ENMT:  No difficulty hearing, tinnitus, vertigo; No sinus or throat pain  NECK: No pain or stiffness  BREASTS: No pain, masses, or nipple discharge  RESPIRATORY: No cough, wheezing, chills or hemoptysis; No shortness of breath  CARDIOVASCULAR: No chest pain, palpitations, dizziness, or leg swelling  GASTROINTESTINAL: No abdominal or epigastric pain. No nausea, vomiting, or hematemesis; No diarrhea or constipation. No melena or hematochezia.  GENITOURINARY: No dysuria, frequency, hematuria, or incontinence  NEUROLOGICAL: No headaches, memory loss, loss of strength, numbness, or tremors  SKIN: No itching, burning, rashes, or lesions   LYMPH NODES: No enlarged glands  ENDOCRINE: No heat or cold intolerance; No hair loss  MUSCULOSKELETAL: No joint pain or swelling; No muscle, back, or extremity pain  PSYCHIATRIC: No depression, anxiety, mood swings, or difficulty sleeping  HEME/LYMPH: No easy bruising, or bleeding gums  ALLERGY AND IMMUNOLOGIC: No hives or eczema    MEDICATIONS  (STANDING):  docusate sodium 100 milliGRAM(s) Oral two times a day  mirtazapine 15 milliGRAM(s) Oral at bedtime  pantoprazole    Tablet 40 milliGRAM(s) Oral before breakfast  polyethylene glycol 3350 17 Gram(s) Oral daily  rivaroxaban 15 milliGRAM(s) Oral two times a day  sertraline 25 milliGRAM(s) Oral daily  sertraline 50 milliGRAM(s) Oral daily    MEDICATIONS  (PRN):  acetaminophen   Tablet .. 650 milliGRAM(s) Oral every 6 hours PRN Temp greater or equal to 38C (100.4F), Mild Pain (1 - 3)  bisacodyl Suppository 10 milliGRAM(s) Rectal daily PRN Constipation  LORazepam    Concentrate 0.5 milliGRAM(s) SubLingual every 8 hours PRN Anxiety  morphine Concentrate 2.5 milliGRAM(s) SubLingual every 6 hours PRN Moderate Pain (4 - 6) or dyspnea  OLANZapine Injectable 5 milliGRAM(s) IntraMuscular every 6 hours PRN Acute agitation      ALLERGIES: No Known Allergies      FAMILY HISTORY:  No pertinent family history in first degree relatives      PHYSICAL EXAMINATION:  -----------------------------  T(C): 36.9 (11-26-18 @ 04:15), Max: 36.9 (11-26-18 @ 04:15)  HR: 96 (11-26-18 @ 04:15) (96 - 96)  BP: 143/86 (11-26-18 @ 04:15) (143/86 - 143/86)  RR: 18 (11-26-18 @ 04:15) (18 - 18)  SpO2: 97% (11-26-18 @ 04:15) (97% - 97%)  Wt(kg): --        Constitutional: well developed, normal appearance, well groomed, well nourished, no deformities and no acute distress.   Eyes: the conjunctiva exhibited no abnormalities and the eyelids demonstrated no xanthelasmas.   HEENT: normal oral mucosa, no oral pallor and no oral cyanosis.   Neck: normal jugular venous A waves present, normal jugular venous V waves present and no jugular venous lao A waves.   Pulmonary: no respiratory distress, normal respiratory rhythm and effort, no accessory muscle use and lungs were clear to auscultation bilaterally.   Cardiovascular: heart rate and rhythm were normal, normal S1 and S2 and no murmur, gallop, rub, heave or thrill are present.   Abdomen: soft, non-tender, no hepato-splenomegaly and no abdominal mass palpated.   Musculoskeletal: the gait could not be assessed..   Extremities: no clubbing of the fingernails, no localized cyanosis, no petechial hemorrhages and no ischemic changes.   Skin: normal skin color and pigmentation, no rash, no venous stasis, no skin lesions, no skin ulcer and no xanthoma was observed.   Psychiatric: oriented to person, place, and time, the affect was normal, the mood was normal and not feeling anxious.     LABS:   --------                     RADIOLOGY:  -----------------        ECG:

## 2018-11-26 NOTE — PROGRESS NOTE ADULT - SUBJECTIVE AND OBJECTIVE BOX
PROGRESS NOTE  Patient is a 82y old  Female who presents with a chief complaint of pt is a 81yo female with pmhx of dementia and htn [facility] ems s/p fall. ems reports pt tripped over someone else's walker, pt walks without assistance at baseline. pt found to be febrile on arrival 100.9 Admitted for septic workup and evaluation,send blood and urine cx,serial lactate levels,monitor vitals closley,ivfs hydration,monitor urine output and renal profile,iv abx initiated  pt without complaints. Daughter is at bedside and states patient has a steady gait Found to have DVT and started on full dose AC (26 Nov 2018 11:24)  Chart and available morning labs /imaging are reviewed electronically , urgent issues addressed . More information  is being added upon completion of rounds , when more information is collected and management discussed with consultants , medical staff and social service/case management on the floor     OVERNIGHT  No new issues reported by medical staff . All above noted Patient is resting in a bed comfortably .Confused ,poor mentation .No distress noted     HPI:  pt is a 81yo female with pmhx of dementia and htn [facility] ems s/p fall. ems reports pt tripped over someone else's walker, pt walks without assistance at baseline. pt found to be febrile on arrival 100.9 Admitted for septic workup and evaluation,send blood and urine cx,serial lactate levels,monitor vitals closley,ivfs hydration,monitor urine output and renal profile,iv abx initiated  pt without complaints. Daughter is at bedside and states patient has a steady gait Found to have DVT and started on full dose AC (14 Nov 2018 21:23)    PAST MEDICAL & SURGICAL HISTORY:  Anxiety  HTN (hypertension)  Depression  Dementia  No significant past surgical history      MEDICATIONS  (STANDING):  docusate sodium 100 milliGRAM(s) Oral two times a day  enoxaparin Injectable 50 milliGRAM(s) SubCutaneous every 24 hours  mirtazapine 15 milliGRAM(s) Oral at bedtime  pantoprazole    Tablet 40 milliGRAM(s) Oral before breakfast  polyethylene glycol 3350 17 Gram(s) Oral daily  sertraline 25 milliGRAM(s) Oral daily  sertraline 50 milliGRAM(s) Oral daily    MEDICATIONS  (PRN):  acetaminophen   Tablet .. 650 milliGRAM(s) Oral every 6 hours PRN Temp greater or equal to 38C (100.4F), Mild Pain (1 - 3)  bisacodyl Suppository 10 milliGRAM(s) Rectal daily PRN Constipation  LORazepam    Concentrate 0.5 milliGRAM(s) SubLingual every 8 hours PRN Anxiety  morphine Concentrate 2.5 milliGRAM(s) SubLingual every 6 hours PRN Moderate Pain (4 - 6) or dyspnea  OLANZapine Injectable 5 milliGRAM(s) IntraMuscular every 6 hours PRN Acute agitation      OBJECTIVE    T(C): 36.9 (11-26-18 @ 04:15), Max: 36.9 (11-26-18 @ 04:15)  HR: 96 (11-26-18 @ 04:15) (96 - 96)  BP: 143/86 (11-26-18 @ 04:15) (143/86 - 143/86)  RR: 18 (11-26-18 @ 04:15) (18 - 18)  SpO2: 97% (11-26-18 @ 04:15) (97% - 97%)  Wt(kg): --  I&O's Summary    REVIEW OF SYSTEMS:  PHYSICAL EXAM:  Appearance: NAD. VS past 24 hrs -as above   HEENT:   Moist oral mucosa. Conjunctiva clear b/l.   Neck : supple  Respiratory: Lungs CTAB.  Gastrointestinal:  Soft, nontender. No rebound. No rigidity. BS present	  Cardiovascular: RRR ,S1S2 present  Neurologic: Non-focal. Moving all extremities.  Extremities: No edema. No erythema. No calf tenderness.  Skin: No rashes, No ecchymoses, No cyanosis.	  wounds ,skin lesions-See skin assesment flow sheet       LABS:                        11.7   12.89 )-----------( 353      ( 26 Nov 2018 09:11 )             37.9     11-26    154<H>  |  116<H>  |  69<H>  ----------------------------<  127<H>  3.8   |  29  |  2.10<H>    Ca    9.8      26 Nov 2018 09:11      CAPILLARY BLOOD GLUCOSE              RADIOLOGY & ADDITIONAL TESTS:   reviewed elctronically  ASSESSMENT/PLAN:

## 2018-11-26 NOTE — PROGRESS NOTE ADULT - SUBJECTIVE AND OBJECTIVE BOX
Adriana Swanson [facility] P   ALLERGY nka  CONTACT Dtr Tameka Campos  Self   REASON FOR VISIT Subsequent Pulmonary followup  Initial evaluation/Pulmonary consultation requested 11/14/2018 by Dr Olivares from Dr Khalil   Patient examined chart reviewed  HOSPITAL ADMISSION [facility]  P 11/14/2018   PATIENT CAME  FROM (if information available)      REVIEW OF SYMPTOMS    Able to give ROS  NO     PHYSICAL EXAM    HEENT Unremarkable PERRLA atraumatic   RESP Fair air entry EXP prolonged    Harsh breath sound Resp distres mild   CARDIAC S1 S2 No S3     NO JVD    ABDOMEN SOFT BS PRESENT NOT DISTENDED No hepatosplenomegaly PEDAL EDEMA present No calf tenderness  NO rash   GENERAL Not TOXIC looking    VITALS/LABS                           11/26/2018 afeb 96 140/86 18 97% 54   11/23/2018 W 6.9 Hb 10.4 Plt 202 Na 142 K 3.3 CP2 23 Cr .7     GLOBAL ISSUE/BEST PRACTICE:      PROBLEM: HOB elevation:   y            PROBLEM: Stress ulcer proph:    protonix 40 911/14)                       PROBLEM: VTE prophylaxis:      lvnx 50.2 (11/14) ? Rivaroxaban 15.2 (11/17)  PROBLEM: Glycemic control:    na  PROBLEM: Nutrition:    watkins (11/14)   PROBLEM: Advanced directive: na     PROBLEM: Allergies:  na  EVENT 11/17/2018 DW fam in detail re pros and cons of doac v vka v ivcf   EVENT AGITATION 11/14/2018 Constant observation     PATIENT MANAGEMENT   11/14/2018 ADMISSION [facility] P DR LEVY TY   11/14/2018 83yo female with pmhx of dementia and htn [facility] ems s/p fall. ems reports pt tripped over someone else's walker, pt walks without assistance at baseline. pt found to be febrile on arrival 100.9. pt without complaints.   pmd: [place]    Pt was found to have DVT and was started on full dose lovenox 11/14 She was also found to have patchy pl based opacity rml Case was dw IR and Dr Farah felt that ctnb should not be done but a repeat CT ch should be done in 2 m Dr Kaiser was consulted 11/17/2018 to decide re ivcf vs Xarelto and this was dw family in detail on 11/17/2018   Pt was seen by Dr Kaiser and fam declined IVCF which was recommended   Pt was started on Xarelto    ASSESSMENT/RECOMMENDATIONS  11/14/2018 ADMISSION [facility] P DR LEVY TY    DVT   11/18/2018 Seen by Dr Kaiser ivcf recommended but daughter Mrs Campos declined   11/17/2018 Started on Xarelto 15.2 (11/17)   11/16/2018 DW Dr Ty and with family Pt is steady on her feet and this was a trip fall Pros and cons of doac explained to pt family Xarelto agreed upon by Dr Ty   11/14/2018 V duplex R leg DVT     ELEVATED CREAT   Monitored serially Improved     PATCHY OPACITY RML ON CT CHEST DONE 11/14  This could be a pulmonary infarct Will dw IR to see if ctnb is feasible Will need serial followup and will need PET after DC   11/16/2018 I roldan Farah He feels that instead of ctnb we should rep[eat CT Chest in 2 m Message sent to Dr Ty   11/24/2018 To take Xareltyo 15.2 x 21 d then Xarelto 20    PNEUMONIA   Bacterial pneumonia unlikely in absence of procalcitonin leukocytosis and fever  DCed abio (11/15/2018)   11/14 bc n 11/14 uc n     Awaits placement    TIME SPENT Over 25 minutes aggregate care time spent on encounter; activities included   direct patient care, counseling and/or coordinating care reviewing notes, lab data/ imaging , discussion with multidisciplinary team/ patient  /family. Risks, benefits, alternatives  discussed in detail.

## 2018-11-26 NOTE — CHART NOTE - NSCHARTNOTEFT_GEN_A_CORE
Assessment: Pt not eating last few days, placed on comfort care, awaiting d/c with hospice. No new labs. BM 11/24.     Factors impacting intake: [ ] none [ ] nausea  [ ] vomiting [ ] diarrhea [ ] constipation  [ ]chewing problems [ ] swallowing issues  [ ] other:     Diet Presciption: Diet, Dysphagia 2 Mechanical Soft-Thin Liquids:   Supplement Feeding Modality:  Oral  Ensure Enlive Servings Per Day:  1       Frequency:  Two Times a day (11-21-18 @ 08:00)    Intake: 0%        Pertinent Medications: MEDICATIONS  (STANDING):  docusate sodium 100 milliGRAM(s) Oral two times a day  mirtazapine 15 milliGRAM(s) Oral at bedtime  pantoprazole    Tablet 40 milliGRAM(s) Oral before breakfast  polyethylene glycol 3350 17 Gram(s) Oral daily  rivaroxaban 15 milliGRAM(s) Oral two times a day  sertraline 25 milliGRAM(s) Oral daily  sertraline 50 milliGRAM(s) Oral daily    MEDICATIONS  (PRN):  acetaminophen   Tablet .. 650 milliGRAM(s) Oral every 6 hours PRN Temp greater or equal to 38C (100.4F), Mild Pain (1 - 3)  bisacodyl Suppository 10 milliGRAM(s) Rectal daily PRN Constipation  LORazepam    Concentrate 0.5 milliGRAM(s) SubLingual every 8 hours PRN Anxiety  morphine Concentrate 2.5 milliGRAM(s) SubLingual every 6 hours PRN Moderate Pain (4 - 6) or dyspnea  OLANZapine Injectable 5 milliGRAM(s) IntraMuscular every 6 hours PRN Acute agitation    Pertinent Labs: 11-26 Na154 mmol/L<H> Glu 127 mg/dL<H> K+ 3.8 mmol/L Cr  2.10 mg/dL<H> BUN 69 mg/dL<H> 11-15 Chol 161 mg/dL  mg/dL HDL 39 mg/dL<L> Trig 79 mg/dL     CAPILLARY BLOOD GLUCOSE        Skin:     Estimated Needs:   x[ ] no change since previous assessment  [ ] recalculated:     Previous Nutrition Diagnosis: (X)swallowing difficulty  [x ] Inadequate Energy Intake [ ]Inadequate Oral Intake [ ] Excessive Energy Intake   [ ] Underweight [ ] Increased Nutrient Needs [ ] Overweight/Obesity   [ ] Altered GI Function [ ] Unintended Weight Loss [ ] Food & Nutrition Related Knowledge Deficit [ ] Malnutrition     Nutrition Diagnosis is [x ] ongoing  [ ] resolved [ ] not applicable     New Nutrition Diagnosis: [ ] not applicable       Interventions: No new interventions, on comfort care. Will be available if needed.  Recommend  [ ] Change Diet To:  [ ] Nutrition Supplement  [ ] Nutrition Support  [ ] Other:     Monitoring and Evaluation:   [ ] PO intake [ x ] Tolerance to diet prescription [ x ] weights [ x ] labs[ x ] follow up per protocol  [ ] other:

## 2018-11-26 NOTE — PHARMACOTHERAPY INTERVENTION NOTE - COMMENTS
Patient on xarelto for DVT treatment, but with patients creatinine clearance of 17 ml/min today I spoke to Dr. Ty and suggested we discontinue the Xarelto and start the patient on warfarin and Lovenox bridge. Dr. Ty agreed with doing full dose Lovenox of 50 mg qdaily and no warfarin.  Lovenox adjusted for renal dosing and order entered into sunrise.

## 2018-11-27 RX ORDER — ENOXAPARIN SODIUM 100 MG/ML
50 INJECTION SUBCUTANEOUS
Qty: 0 | Refills: 0 | COMMUNITY
Start: 2018-11-27

## 2018-11-27 RX ORDER — SODIUM CHLORIDE 9 MG/ML
1000 INJECTION, SOLUTION INTRAVENOUS
Qty: 0 | Refills: 0 | Status: DISCONTINUED | OUTPATIENT
Start: 2018-11-27 | End: 2018-11-28

## 2018-11-27 RX ORDER — POLYETHYLENE GLYCOL 3350 17 G/17G
17 POWDER, FOR SOLUTION ORAL
Qty: 0 | Refills: 0 | COMMUNITY
Start: 2018-11-27

## 2018-11-27 RX ORDER — MORPHINE SULFATE 50 MG/1
2.5 CAPSULE, EXTENDED RELEASE ORAL
Qty: 0 | Refills: 0 | COMMUNITY
Start: 2018-11-27

## 2018-11-27 RX ORDER — ACETAMINOPHEN 500 MG
2 TABLET ORAL
Qty: 0 | Refills: 0 | COMMUNITY
Start: 2018-11-27

## 2018-11-27 RX ORDER — ENOXAPARIN SODIUM 100 MG/ML
0 INJECTION SUBCUTANEOUS
Qty: 0 | Refills: 0 | COMMUNITY
Start: 2018-11-27

## 2018-11-27 RX ORDER — SENNA PLUS 8.6 MG/1
2 TABLET ORAL
Qty: 0 | Refills: 0 | COMMUNITY

## 2018-11-27 RX ADMIN — Medication 100 MILLIGRAM(S): at 17:47

## 2018-11-27 RX ADMIN — ENOXAPARIN SODIUM 50 MILLIGRAM(S): 100 INJECTION SUBCUTANEOUS at 15:12

## 2018-11-27 RX ADMIN — MIRTAZAPINE 15 MILLIGRAM(S): 45 TABLET, ORALLY DISINTEGRATING ORAL at 21:31

## 2018-11-27 RX ADMIN — SERTRALINE 50 MILLIGRAM(S): 25 TABLET, FILM COATED ORAL at 12:16

## 2018-11-27 RX ADMIN — POLYETHYLENE GLYCOL 3350 17 GRAM(S): 17 POWDER, FOR SOLUTION ORAL at 12:16

## 2018-11-27 RX ADMIN — PANTOPRAZOLE SODIUM 40 MILLIGRAM(S): 20 TABLET, DELAYED RELEASE ORAL at 05:58

## 2018-11-27 RX ADMIN — SERTRALINE 25 MILLIGRAM(S): 25 TABLET, FILM COATED ORAL at 12:16

## 2018-11-27 RX ADMIN — SODIUM CHLORIDE 75 MILLILITER(S): 9 INJECTION, SOLUTION INTRAVENOUS at 17:47

## 2018-11-27 RX ADMIN — Medication 100 MILLIGRAM(S): at 05:58

## 2018-11-27 NOTE — PROGRESS NOTE ADULT - SUBJECTIVE AND OBJECTIVE BOX
Neurology follow up note    LYUBOV [id number]      Interval History:    Patient resting in bed    MEDICATIONS    acetaminophen   Tablet .. 650 milliGRAM(s) Oral every 6 hours PRN  bisacodyl Suppository 10 milliGRAM(s) Rectal daily PRN  docusate sodium 100 milliGRAM(s) Oral two times a day  enoxaparin Injectable 50 milliGRAM(s) SubCutaneous every 24 hours  LORazepam    Concentrate 0.5 milliGRAM(s) SubLingual every 8 hours PRN  mirtazapine 15 milliGRAM(s) Oral at bedtime  morphine Concentrate 2.5 milliGRAM(s) SubLingual every 6 hours PRN  OLANZapine Injectable 5 milliGRAM(s) IntraMuscular every 6 hours PRN  pantoprazole    Tablet 40 milliGRAM(s) Oral before breakfast  polyethylene glycol 3350 17 Gram(s) Oral daily  sertraline 25 milliGRAM(s) Oral daily  sertraline 50 milliGRAM(s) Oral daily      Allergies    No Known Allergies    Intolerances            Vital Signs Last 24 Hrs  T(C): 36.4 (27 Nov 2018 05:10), Max: 37.6 (26 Nov 2018 20:54)  T(F): 97.6 (27 Nov 2018 05:10), Max: 99.6 (26 Nov 2018 20:54)  HR: 102 (27 Nov 2018 05:10) (101 - 102)  BP: 129/80 (27 Nov 2018 05:10) (129/80 - 137/83)  BP(mean): --  RR: 18 (27 Nov 2018 05:10) (18 - 18)  SpO2: 97% (27 Nov 2018 05:10) (97% - 97%)    REVIEW OF SYSTEMS:  Extremely limited secondary to the patient's baseline that she says one to two words and occasionally will articulate.    PHYSICAL EXAMINATION:     HEENT:  Head:  Positive trauma was noted over the left eye.    Eyes:  No scleral icterus.    Ears:  Hearing hard to evaluate.    NECK:  Supple.    RESPIRATORY:  Decreased breath sounds bilaterally.    CARDIOVASCULAR:  S1 and S2 are heard.    ABDOMEN:  Soft and nontender.    EXTREMITIES:  No clubbing or cyanosis were noted.      NEUROLOGIC:  The patient is awake and alert.    Extraocular movements were intact.  Positive blink to bilateral visual threat.    The patient would mumble, occasionally would say one word, was off and on following commands.  Motor:  With stimuli, did move all four extremities.  No focality was noted.    Tone:  Bilateral upper and lower was increased.                    LABS:  CBC Full  -  ( 26 Nov 2018 09:11 )  WBC Count : 12.89 K/uL  Hemoglobin : 11.7 g/dL  Hematocrit : 37.9 %  Platelet Count - Automated : 353 K/uL  Mean Cell Volume : 106.8 fl  Mean Cell Hemoglobin : 33.0 pg  Mean Cell Hemoglobin Concentration : 30.9 gm/dL  Auto Neutrophil # : x  Auto Lymphocyte # : x  Auto Monocyte # : x  Auto Eosinophil # : x  Auto Basophil # : x  Auto Neutrophil % : x  Auto Lymphocyte % : x  Auto Monocyte % : x  Auto Eosinophil % : x  Auto Basophil % : x      11-26    154<H>  |  116<H>  |  69<H>  ----------------------------<  127<H>  3.8   |  29  |  2.10<H>    Ca    9.8      26 Nov 2018 09:11      Hemoglobin A1C:       Vitamin B12         RADIOLOGY        ANALYSIS AND PLAN:  An 82-year-old with an episode of fall, head trauma, and change in mental status.  1.	For episodes of fall, this appears to be a mechanical fall.  There is no clear history of epilepsy reported.  The examination was limited, but no signs to suggest a new cerebrovascular accident has ensued.  2.	For episode of head trauma, would recommend to continue neuro checks.  3.	For change in mental status, most likely secondary to dementia made worse in the hospital setting.  4.	For history of dementia, secondary to advanced symptoms, do not feel medications would be of any benefit.  The patient did try medications in the past.  5.	Spoke with the daughter, Marychuy, at [phone] 11/23/18  6.	Physical Therapy evaluation.  7.	Fall precautions.  8.	monitor intake  9.	lethargy continue to hold Risperdal for now    10.	no new events noted   11.	awaiting placement   12.	Greater than 25  minutes of time was spent with the patient, plan of care, reviewing data, and speaking to the family and multidisciplinary healthcare team.    Thank you for the courtesy of this consultation.

## 2018-11-27 NOTE — PROGRESS NOTE ADULT - SUBJECTIVE AND OBJECTIVE BOX
Patient is a 82y Female with a known history of :  Constipation (K59.00)  Delirium (R41.0)  ANNY (acute kidney injury) (N17.9)  Prophylactic measure (Z29.9)  Anxiety (F41.9)  Dementia (F03.90)  Depression (F32.9)  HTN (hypertension) (I10)  PNA (pneumonia) (J18.9)  DVT (deep venous thrombosis) (I82.409)  Fall (W19.XXXA)  Suspected deep vein thrombosis ([id number])    HPI:  pt is a 83yo female with pmhx of dementia and htn [facility] ems s/p fall. ems reports pt tripped over someone else's walker, pt walks without assistance at baseline. pt found to be febrile on arrival 100.9 Admitted for septic workup and evaluation,send blood and urine cx,serial lactate levels,monitor vitals closley,ivfs hydration,monitor urine output and renal profile,iv abx initiated  pt without complaints. Daughter is at bedside and states patient has a steady gait Found to have DVT and started on full dose AC (14 Nov 2018 21:23)      REVIEW OF SYSTEMS:    CONSTITUTIONAL: No fever, weight loss, or fatigue  EYES: No eye pain, visual disturbances, or discharge  ENMT:  No difficulty hearing, tinnitus, vertigo; No sinus or throat pain  NECK: No pain or stiffness  BREASTS: No pain, masses, or nipple discharge  RESPIRATORY: No cough, wheezing, chills or hemoptysis; No shortness of breath  CARDIOVASCULAR: No chest pain, palpitations, dizziness, or leg swelling  GASTROINTESTINAL: No abdominal or epigastric pain. No nausea, vomiting, or hematemesis; No diarrhea or constipation. No melena or hematochezia.  GENITOURINARY: No dysuria, frequency, hematuria, or incontinence  NEUROLOGICAL: No headaches, memory loss, loss of strength, numbness, or tremors  SKIN: No itching, burning, rashes, or lesions   LYMPH NODES: No enlarged glands  ENDOCRINE: No heat or cold intolerance; No hair loss  MUSCULOSKELETAL: No joint pain or swelling; No muscle, back, or extremity pain  PSYCHIATRIC: No depression, anxiety, mood swings, or difficulty sleeping  HEME/LYMPH: No easy bruising, or bleeding gums  ALLERGY AND IMMUNOLOGIC: No hives or eczema    MEDICATIONS  (STANDING):  docusate sodium 100 milliGRAM(s) Oral two times a day  enoxaparin Injectable 50 milliGRAM(s) SubCutaneous every 24 hours  mirtazapine 15 milliGRAM(s) Oral at bedtime  pantoprazole    Tablet 40 milliGRAM(s) Oral before breakfast  polyethylene glycol 3350 17 Gram(s) Oral daily  sertraline 25 milliGRAM(s) Oral daily  sertraline 50 milliGRAM(s) Oral daily    MEDICATIONS  (PRN):  acetaminophen   Tablet .. 650 milliGRAM(s) Oral every 6 hours PRN Temp greater or equal to 38C (100.4F), Mild Pain (1 - 3)  bisacodyl Suppository 10 milliGRAM(s) Rectal daily PRN Constipation  LORazepam    Concentrate 0.5 milliGRAM(s) SubLingual every 8 hours PRN Anxiety  morphine Concentrate 2.5 milliGRAM(s) SubLingual every 6 hours PRN Moderate Pain (4 - 6) or dyspnea  OLANZapine Injectable 5 milliGRAM(s) IntraMuscular every 6 hours PRN Acute agitation      ALLERGIES: No Known Allergies      FAMILY HISTORY:  No pertinent family history in first degree relatives      PHYSICAL EXAMINATION:  -----------------------------  T(C): 36.4 (11-27-18 @ 05:10), Max: 37.6 (11-26-18 @ 20:54)  HR: 102 (11-27-18 @ 05:10) (101 - 102)  BP: 129/80 (11-27-18 @ 05:10) (129/80 - 137/83)  RR: 18 (11-27-18 @ 05:10) (18 - 18)  SpO2: 97% (11-27-18 @ 05:10) (97% - 97%)  Wt(kg): --        Constitutional: well developed, normal appearance, well groomed, well nourished, no deformities and no acute distress.   Eyes: the conjunctiva exhibited no abnormalities and the eyelids demonstrated no xanthelasmas.   HEENT: normal oral mucosa, no oral pallor and no oral cyanosis.   Neck: normal jugular venous A waves present, normal jugular venous V waves present and no jugular venous lao A waves.   Pulmonary: no respiratory distress, normal respiratory rhythm and effort, no accessory muscle use and lungs were clear to auscultation bilaterally.   Cardiovascular: heart rate and rhythm were normal, normal S1 and S2 and no murmur, gallop, rub, heave or thrill are present.   Abdomen: soft, non-tender, no hepato-splenomegaly and no abdominal mass palpated.   Musculoskeletal: the gait could not be assessed..   Extremities: no clubbing of the fingernails, no localized cyanosis, no petechial hemorrhages and no ischemic changes.   Skin: normal skin color and pigmentation, no rash, no venous stasis, no skin lesions, no skin ulcer and no xanthoma was observed.   Psychiatric: oriented to person, place, and time, the affect was normal, the mood was normal and not feeling anxious.     LABS:   --------  11-26    154<H>  |  116<H>  |  69<H>  ----------------------------<  127<H>  3.8   |  29  |  2.10<H>    Ca    9.8      26 Nov 2018 09:11                           11.7   12.89 )-----------( 353      ( 26 Nov 2018 09:11 )             37.9                 RADIOLOGY:  -----------------        ECG:

## 2018-11-27 NOTE — PROGRESS NOTE ADULT - SUBJECTIVE AND OBJECTIVE BOX
Chart reviewed Patient examined Detailed note to follow after accumulating and processing available data Meanwhile continue management as recommended in my previous note Chart reviewed Patient examined Detailed note to follow after accumulating and processing available data Meanwhile continue management as recommended in my previous note           Adriana Swanson [facility]   ALLERGY nka  CONTACT Dtr Tameka Campos  Self   REASON FOR VISIT Subsequent Pulmonary followup  Initial evaluation/Pulmonary consultation requested 11/14/2018 by Dr Olivares from Dr Khalil   Patient examined chart reviewed  HOSPITAL ADMISSION [facility]  P 11/14/2018   PATIENT CAME  FROM (if information available)      VITALS/LABS                           11/27/2018 afeb 102 120/80   11/23/2018 W 6.9 Hb 10.4 Plt 202 Na 142 K 3.3 CP2 23 Cr .7     REVIEW OF SYMPTOMS    Able to give ROS  NO     PHYSICAL EXAM    HEENT Unremarkable PERRLA atraumatic   RESP Fair air entry EXP prolonged    Harsh breath sound Resp distres mild   CARDIAC S1 S2 No S3     NO JVD    ABDOMEN SOFT BS PRESENT NOT DISTENDED No hepatosplenomegaly PEDAL EDEMA present No calf tenderness  NO rash   GENERAL Not TOXIC looking    PATIENT MANAGEMENT   11/14/2018 ADMISSION [facility] DR LEVY TY   11/14/2018 81yo female with pmhx of dementia and htn [facility] ems s/p fall. ems reports pt tripped over someone else's walker, pt walks without assistance at baseline. pt found to be febrile on arrival 100.9. pt without complaints.   pmd: irving    Pt was found to have DVT and was started on full dose lovenox 11/14 She was also found to have patchy pl based opacity rml Case was dw IR and Dr Farah felt that ctnb should not be done but a repeat CT ch should be done in 2 m Dr Kaiser was consulted 11/17/2018 to decide re ivcf vs Xarelto and this was dw family in detail on 11/17/2018   Pt was seen by Dr Kaiser and fam declined IVCF which was recommended   Pt was started on Xarelto Changed to Lovenox   11/27/2018 Hospice DC being planned     ASSESSMENT/RECOMMENDATIONS  11/14/2018 ADMISSION [facility] DR LEVY TY    DVT   11/18/2018 Seen by Dr Kaiser ivcf recommended but daughter Mrs Campos declined   11/17/2018 Started on Xarelto 15.2 (11/17)   11/16/2018 DW Dr Ty and with family Pt is steady on her feet and this was a trip fall Pros and cons of doac explained to pt family Xarelto agreed upon by Dr Ty   11/14/2018 V duplex R leg DVT     ELEVATED CREAT   Monitored serially Improved     PATCHY OPACITY RML ON CT CHEST DONE 11/14  This could be a pulmonary infarct Will dw IR to see if ctnb is feasible Will need serial followup and will need PET after DC   11/16/2018 I dw Dr Farah He feels that instead of ctnb we should rep[eat CT Chest in 2 m Message sent to Dr Ty   11/24/2018 To take Xareltyo 15.2 x 21 d then Xarelto 20    PNEUMONIA   Bacterial pneumonia unlikely in absence of procalcitonin leukocytosis and fever  DCed abio (11/15/2018)   11/14 bc n 11/14 uc n     Awaits placement    TIME SPENT Over 25 minutes aggregate care time spent on encounter; activities included   direct patient care, counseling and/or coordinating care reviewing notes, lab data/ imaging , discussion with multidisciplinary team/ patient  /family. Risks, benefits, alternatives  discussed in detail.

## 2018-11-27 NOTE — PROGRESS NOTE ADULT - SUBJECTIVE AND OBJECTIVE BOX
PROGRESS NOTE  Patient is a 82y old  Female who presents with a chief complaint of pt is a 83yo female with pmhx of dementia and htn [facility] ems s/p fall. ems reports pt tripped over someone else's walker, pt walks without assistance at baseline. pt found to be febrile on arrival 100.9 Admitted for septic workup and evaluation,send blood and urine cx,serial lactate levels,monitor vitals closley,ivfs hydration,monitor urine output and renal profile,iv abx initiated  pt without complaints. Daughter is at bedside and states patient has a steady gait Found to have DVT and started on full dose AC (27 Nov 2018 09:55)  Chart and available morning labs /imaging are reviewed electronically , urgent issues addressed . More information  is being added upon completion of rounds , when more information is collected and management discussed with consultants , medical staff and social service/case management on the floor     OVERNIGHT  No new issues reported by medical staff . All above noted Patient is resting in a bed comfortably .Confused ,poor mentation .No distress noted     HPI:  pt is a 83yo female with pmhx of dementia and htn [facility] ems s/p fall. ems reports pt tripped over someone else's walker, pt walks without assistance at baseline. pt found to be febrile on arrival 100.9 Admitted for septic workup and evaluation,send blood and urine cx,serial lactate levels,monitor vitals closley,ivfs hydration,monitor urine output and renal profile,iv abx initiated  pt without complaints. Daughter is at bedside and states patient has a steady gait Found to have DVT and started on full dose AC (14 Nov 2018 21:23)    PAST MEDICAL & SURGICAL HISTORY:  Anxiety  HTN (hypertension)  Depression  Dementia  No significant past surgical history      MEDICATIONS  (STANDING):  docusate sodium 100 milliGRAM(s) Oral two times a day  enoxaparin Injectable 50 milliGRAM(s) SubCutaneous every 24 hours  mirtazapine 15 milliGRAM(s) Oral at bedtime  pantoprazole    Tablet 40 milliGRAM(s) Oral before breakfast  polyethylene glycol 3350 17 Gram(s) Oral daily  sertraline 25 milliGRAM(s) Oral daily  sertraline 50 milliGRAM(s) Oral daily    MEDICATIONS  (PRN):  acetaminophen   Tablet .. 650 milliGRAM(s) Oral every 6 hours PRN Temp greater or equal to 38C (100.4F), Mild Pain (1 - 3)  bisacodyl Suppository 10 milliGRAM(s) Rectal daily PRN Constipation  LORazepam    Concentrate 0.5 milliGRAM(s) SubLingual every 8 hours PRN Anxiety  morphine Concentrate 2.5 milliGRAM(s) SubLingual every 6 hours PRN Moderate Pain (4 - 6) or dyspnea  OLANZapine Injectable 5 milliGRAM(s) IntraMuscular every 6 hours PRN Acute agitation      OBJECTIVE    T(C): 36.4 (11-27-18 @ 05:10), Max: 37.6 (11-26-18 @ 20:54)  HR: 102 (11-27-18 @ 05:10) (101 - 102)  BP: 129/80 (11-27-18 @ 05:10) (129/80 - 137/83)  RR: 18 (11-27-18 @ 05:10) (18 - 18)  SpO2: 97% (11-27-18 @ 05:10) (97% - 97%)  Wt(kg): --  I&O's Summary        REVIEW OF SYSTEMS:  ROS is unobtainable due to lethargy and confusion     PHYSICAL EXAM:  Appearance: NAD. VS past 24 hrs -as above   HEENT:   Moist oral mucosa. Conjunctiva clear b/l.   Neck : supple  Respiratory: Lungs CTAB.  Gastrointestinal:  Soft, nontender. No rebound. No rigidity. BS present	  Cardiovascular: RRR ,S1S2 present  Neurologic: Non-focal. Moving all extremities.  Extremities: No edema. No erythema. No calf tenderness.  Skin: No rashes, No ecchymoses, No cyanosis.	  wounds ,skin lesions-See skin assesment flow sheet   LABS:                        11.7   12.89 )-----------( 353      ( 26 Nov 2018 09:11 )             37.9     11-26    154<H>  |  116<H>  |  69<H>  ----------------------------<  127<H>  3.8   |  29  |  2.10<H>    Ca    9.8      26 Nov 2018 09:11      CAPILLARY BLOOD GLUCOSE              RADIOLOGY & ADDITIONAL TESTS:   reviewed elctronically  ASSESSMENT/PLAN:

## 2018-11-28 DIAGNOSIS — E87.0 HYPEROSMOLALITY AND HYPERNATREMIA: ICD-10-CM

## 2018-11-28 DIAGNOSIS — J69.0 PNEUMONITIS DUE TO INHALATION OF FOOD AND VOMIT: ICD-10-CM

## 2018-11-28 LAB
ANION GAP SERPL CALC-SCNC: 9 MMOL/L — SIGNIFICANT CHANGE UP (ref 5–17)
BUN SERPL-MCNC: 81 MG/DL — HIGH (ref 7–23)
CALCIUM SERPL-MCNC: 9 MG/DL — SIGNIFICANT CHANGE UP (ref 8.5–10.1)
CHLORIDE SERPL-SCNC: 116 MMOL/L — HIGH (ref 96–108)
CO2 SERPL-SCNC: 28 MMOL/L — SIGNIFICANT CHANGE UP (ref 22–31)
CREAT SERPL-MCNC: 2.9 MG/DL — HIGH (ref 0.5–1.3)
GLUCOSE SERPL-MCNC: 174 MG/DL — HIGH (ref 70–99)
HCT VFR BLD CALC: 36.5 % — SIGNIFICANT CHANGE UP (ref 34.5–45)
HGB BLD-MCNC: 11.3 G/DL — LOW (ref 11.5–15.5)
MCHC RBC-ENTMCNC: 31 GM/DL — LOW (ref 32–36)
MCHC RBC-ENTMCNC: 33 PG — SIGNIFICANT CHANGE UP (ref 27–34)
MCV RBC AUTO: 106.7 FL — HIGH (ref 80–100)
NRBC # BLD: 0 /100 WBCS — SIGNIFICANT CHANGE UP (ref 0–0)
PLATELET # BLD AUTO: 319 K/UL — SIGNIFICANT CHANGE UP (ref 150–400)
POTASSIUM SERPL-MCNC: 3.9 MMOL/L — SIGNIFICANT CHANGE UP (ref 3.5–5.3)
POTASSIUM SERPL-SCNC: 3.9 MMOL/L — SIGNIFICANT CHANGE UP (ref 3.5–5.3)
RBC # BLD: 3.42 M/UL — LOW (ref 3.8–5.2)
RBC # FLD: 13.9 % — SIGNIFICANT CHANGE UP (ref 10.3–14.5)
SODIUM SERPL-SCNC: 153 MMOL/L — HIGH (ref 135–145)
WBC # BLD: 10.94 K/UL — HIGH (ref 3.8–10.5)
WBC # FLD AUTO: 10.94 K/UL — HIGH (ref 3.8–10.5)

## 2018-11-28 PROCEDURE — 76770 US EXAM ABDO BACK WALL COMP: CPT | Mod: 26

## 2018-11-28 PROCEDURE — 73502 X-RAY EXAM HIP UNI 2-3 VIEWS: CPT | Mod: 26,LT

## 2018-11-28 PROCEDURE — 72170 X-RAY EXAM OF PELVIS: CPT | Mod: 26,59

## 2018-11-28 RX ORDER — SODIUM CHLORIDE 9 MG/ML
1000 INJECTION, SOLUTION INTRAVENOUS
Qty: 0 | Refills: 0 | Status: DISCONTINUED | OUTPATIENT
Start: 2018-11-28 | End: 2018-11-30

## 2018-11-28 RX ADMIN — ENOXAPARIN SODIUM 50 MILLIGRAM(S): 100 INJECTION SUBCUTANEOUS at 15:05

## 2018-11-28 RX ADMIN — Medication 100 MILLIGRAM(S): at 06:40

## 2018-11-28 RX ADMIN — SERTRALINE 25 MILLIGRAM(S): 25 TABLET, FILM COATED ORAL at 11:48

## 2018-11-28 RX ADMIN — SERTRALINE 50 MILLIGRAM(S): 25 TABLET, FILM COATED ORAL at 11:48

## 2018-11-28 RX ADMIN — PANTOPRAZOLE SODIUM 40 MILLIGRAM(S): 20 TABLET, DELAYED RELEASE ORAL at 06:40

## 2018-11-28 RX ADMIN — MIRTAZAPINE 15 MILLIGRAM(S): 45 TABLET, ORALLY DISINTEGRATING ORAL at 23:03

## 2018-11-28 RX ADMIN — Medication 100 MILLIGRAM(S): at 18:05

## 2018-11-28 RX ADMIN — SODIUM CHLORIDE 75 MILLILITER(S): 9 INJECTION, SOLUTION INTRAVENOUS at 06:41

## 2018-11-28 RX ADMIN — POLYETHYLENE GLYCOL 3350 17 GRAM(S): 17 POWDER, FOR SOLUTION ORAL at 11:48

## 2018-11-28 NOTE — CONSULT NOTE ADULT - PROBLEM SELECTOR RECOMMENDATION 9
ACUTE RENAL FAILURE: due to dehydration and prerenal   continue with iv fluid , us kidney   Serum creatinine is increased    , approximating GFR is decreased    ml/min.   There is  progression . No uremic symptoms    No evidence of anemia.  Fluid status stable.  Will continue to avoid nephrotoxic drugs.  Patient remains asymptomatic.   Continue current therapy.

## 2018-11-28 NOTE — CONSULT NOTE ADULT - PROBLEM SELECTOR RECOMMENDATION 4
f/u blood and urine cx,serial lactate levels,monitor vitals closley,ivfs hydration,monitor urine output and renal profile,iv abx as per id cons

## 2018-11-28 NOTE — PROGRESS NOTE ADULT - SUBJECTIVE AND OBJECTIVE BOX
Neurology follow up note    LYUBOV [id number]      Interval History:    Patient resting in bed    MEDICATIONS    acetaminophen   Tablet .. 650 milliGRAM(s) Oral every 6 hours PRN  bisacodyl Suppository 10 milliGRAM(s) Rectal daily PRN  dextrose 5%. 1000 milliLiter(s) IV Continuous <Continuous>  docusate sodium 100 milliGRAM(s) Oral two times a day  enoxaparin Injectable 50 milliGRAM(s) SubCutaneous every 24 hours  mirtazapine 15 milliGRAM(s) Oral at bedtime  morphine Concentrate 2.5 milliGRAM(s) SubLingual every 6 hours PRN  OLANZapine Injectable 5 milliGRAM(s) IntraMuscular every 6 hours PRN  pantoprazole    Tablet 40 milliGRAM(s) Oral before breakfast  polyethylene glycol 3350 17 Gram(s) Oral daily  sertraline 25 milliGRAM(s) Oral daily  sertraline 50 milliGRAM(s) Oral daily      Allergies    No Known Allergies    Intolerances            Vital Signs Last 24 Hrs  T(C): 36.6 (28 Nov 2018 05:10), Max: 36.7 (27 Nov 2018 21:42)  T(F): 97.8 (28 Nov 2018 05:10), Max: 98 (27 Nov 2018 21:42)  HR: 84 (28 Nov 2018 05:10) (84 - 102)  BP: 120/68 (28 Nov 2018 05:10) (120/68 - 148/78)  BP(mean): --  RR: 16 (28 Nov 2018 05:10) (16 - 17)  SpO2: 94% (28 Nov 2018 05:10) (94% - 95%)    REVIEW OF SYSTEMS:  Extremely limited secondary to the patient's baseline that she says one to two words and occasionally will articulate.    PHYSICAL EXAMINATION:     HEENT:  Head:  Positive trauma was noted over the left eye.    Eyes:  No scleral icterus.    Ears:  Hearing hard to evaluate.    NECK:  Supple.    RESPIRATORY:  Decreased breath sounds bilaterally.    CARDIOVASCULAR:  S1 and S2 are heard.    ABDOMEN:  Soft and nontender.    EXTREMITIES:  No clubbing or cyanosis were noted.      NEUROLOGIC:  The patient is awake and alert.    Extraocular movements were intact.  Positive blink to bilateral visual threat.    The patient would mumble, occasionally would say one word, was off and on following commands.  Motor:  With stimuli, did move all four extremities.  No focality was noted.    Tone:  Bilateral upper and lower was increased.              LABS:  CBC Full  -  ( 28 Nov 2018 10:06 )  WBC Count : 10.94 K/uL  Hemoglobin : 11.3 g/dL  Hematocrit : 36.5 %  Platelet Count - Automated : 319 K/uL  Mean Cell Volume : 106.7 fl  Mean Cell Hemoglobin : 33.0 pg  Mean Cell Hemoglobin Concentration : 31.0 gm/dL  Auto Neutrophil # : x  Auto Lymphocyte # : x  Auto Monocyte # : x  Auto Eosinophil # : x  Auto Basophil # : x  Auto Neutrophil % : x  Auto Lymphocyte % : x  Auto Monocyte % : x  Auto Eosinophil % : x  Auto Basophil % : x      11-28    153<H>  |  116<H>  |  81<H>  ----------------------------<  174<H>  3.9   |  28  |  2.90<H>    Ca    9.0      28 Nov 2018 10:06      Hemoglobin A1C:       Vitamin B12         RADIOLOGY      ANALYSIS AND PLAN:  An 82-year-old with an episode of fall, head trauma, and change in mental status.  1.	For episodes of fall, this appears to be a mechanical fall.  There is no clear history of epilepsy reported.  The examination was limited, but no signs to suggest a new cerebrovascular accident has ensued.  2.	For episode of head trauma, would recommend to continue neuro checks.  3.	For change in mental status, most likely secondary to dementia made worse in the hospital setting.  4.	For history of dementia, secondary to advanced symptoms, do not feel medications would be of any benefit.  The patient did try medications in the past.  5.	Spoke with the daughter, Marychuy, at [phone] 11/23/18  6.	Physical Therapy evaluation.  7.	Fall precautions.  8.	monitor intake  9.	h/o lethargy continue to hold Risperdal for now    10.	no new events noted   11.	awaiting placement   12.	Greater than 25  minutes of time was spent with the patient, plan of care, reviewing data, and speaking to the family and multidisciplinary healthcare team.    Thank you for the courtesy of this consultation.

## 2018-11-28 NOTE — PROGRESS NOTE ADULT - SUBJECTIVE AND OBJECTIVE BOX
Chart reviewed Patient examined Continue management as recommended in my preceding note Detailed note will follow after more data is accumulated and processed Patient is for hospice placement and is on Lovenox for DVT Chart reviewed Patient examined Continue management as recommended in my preceding note Detailed note will follow after more data is accumulated and processed Patient is for hospice placement and is on Lovenox for DVT         Adriana Swanson [facility]   ALLERGY nka  CONTACT Dtr Tameka Campso  Self   REASON FOR VISIT Subsequent Pulmonary followup  Initial evaluation/Pulmonary consultation requested 11/14/2018 by Dr Olivares from Dr Khalil   Patient examined chart reviewed  HOSPITAL ADMISSION [facility]  P 11/14/2018   PATIENT CAME  FROM (if information available)      IVF  D5 100 (11/28)     VITALS/LABS                           11/28/2018 afeb 84 120/60 16 94%   11/28/2018 W 10.9 Hb 11.3 Plt 319 Na 153 K 3.9 CO2 28 Cr 2.9     PATIENT MANAGEMENT   11/14/2018 ADMISSION [facility] P DR LEVY TY   11/14/2018 81yo female with pmhx of dementia and htn [facility] ems s/p fall. ems reports pt tripped over someone else's walker, pt walks without assistance at baseline. pt found to be febrile on arrival 100.9. pt without complaints.   pmd: irving    Pt was found to have DVT and was started on full dose lovenox 11/14 She was also found to have patchy pl based opacity rml Case was dw IR and Dr Farah felt that ctnb should not be done but a repeat CT ch should be done in 2 m Dr Kaiser was consulted 11/17/2018 to decide re ivcf vs Xarelto and this was dw family in detail on 11/17/2018   Pt was seen by Dr Kaiser and fam declined IVCF which was recommended   Pt was started on Xarelto Changed to Lovenox   11/27/2018 Hospice DC being planned   11/28/2018 On cmo     ASSESSMENT/RECOMMENDATIONS  11/14/2018 ADMISSION [facility] P DR LEVY TY    DVT   11/18/2018 Seen by Dr Kaiser ivcf recommended but daughter Mrs Campos declined   11/17/2018 Started on Xarelto 15.2 (11/17)   11/16/2018 DW Dr Ty and with family Pt is steady on her feet and this was a trip fall Pros and cons of doac explained to pt family Xarelto agreed upon by Dr Ty   11/14/2018 V duplex R leg DVT     ELEVATED CREAT   Monitored serially Improved     PATCHY OPACITY RML ON CT CHEST DONE 11/14  This could be a pulmonary infarct Will dw IR to see if ctnb is feasible Will need serial followup and will need PET after DC   11/16/2018 I roldan Farah He feels that instead of ctnb we should rep[eat CT Chest in 2 m Message sent to Dr Ty   11/24/2018 To take Xareltyo 15.2 x 21 d then Xarelto 20    PNEUMONIA   Bacterial pneumonia unlikely in absence of procalcitonin leukocytosis and fever  DCed abio (11/15/2018)   11/14 bc n 11/14 uc n     Awaits placement    TIME SPENT Over 25 minutes aggregate care time spent on encounter; activities included   direct patient care, counseling and/or coordinating care reviewing notes, lab data/ imaging , discussion with multidisciplinary team/ patient  /family. Risks, benefits, alternatives  discussed in detail.

## 2018-11-28 NOTE — CONSULT NOTE ADULT - ASSESSMENT
82 y.o. female with dementia has a DVT of her right leg and has a unsteady gait. She has a indication for a filter however her daughter (Mrs. Campos ) does not want any surgical procedures and wants her treated with AC therapy. She is aware of the risks of both surgical and nonsurgical interventions. She is requesting Hospice care.
Adriana Swanson [facility]   ALLERGY nka  CONTACT Dtr Tameka Campos  Self   REASON FOR VISIT   Initial evaluation/Pulmonary consultation requested 11/14/2018 by Dr Olivares from Dr Khalil   Patient examined chart reviewed  HOSPITAL ADMISSION [facility]  P 11/14/2018   PATIENT CAME  FROM (if information available)      VITALS/LABS                           11/15/2018 afeb 84 140/69  18 96%  11/15/2018 W 8.6 Hb 10.2 Plt 195  Na 144 K 3.5 CO2 24 Cr 1.5  11/14/2018 1009 148/83 16  11/14/2018 afeb 94 136/82 19 96%   11/14/2018 W 6.8 Hb 10.9 Plt 222  Na 140 K 3.8 CO2 26 Cr 1   11/14/2018 6a- 10p LA 2.4-1.3    REVIEW OF SYMPTOMS    Able to give ROS  Yes     RELIABLE No   CONSTITUTIONAL Weakness Yes  Chills No Vision changes No  ENDOCRINE No unexplained hair loss No heat or cold intolerance    ALLERGY No hives  Sore throat No   RESP Coughing blood no  Shortness of breath YES   NEURO No Headache  Confusion Pain neck No   CARDIAC No Chest pain No Palpitations   GI No Pain abdomen NO   Vomiting NO     PHYSICAL EXAM    HEENT Unremarkable PERRLA atraumatic   RESP Fair air entry EXP prolonged    Harsh breath sound Resp distres mild   CARDIAC S1 S2 No S3     NO JVD    ABDOMEN SOFT BS PRESENT NOT DISTENDED No hepatosplenomegaly PEDAL EDEMA present No calf tenderness  NO rash   GENERAL Not TOXIC looking    GLOBAL ISSUE/BEST PRACTICE:      PROBLEM: HOB elevation:   y            PROBLEM: Stress ulcer proph:    protonix 40 911/14)                       PROBLEM: VTE prophylaxis:      lvnx 50.2 (11/14)   PROBLEM: Glycemic control:    na  PROBLEM: Nutrition:    watkins (11/14)   PROBLEM: Advanced directive: na     PROBLEM: Allergies:  na  EVENT AGITATION 11/14/2018 Constant observation     PAST MEDICAL/SURGICAL/FAMILY/SOCIAL HISTORY:    Past Medical History:  Anxiety    Dementia    Depression    HTN (hypertension).    PATIENT MANAGEMENT   11/14/2018 ADMISSION [facility] P DR LEVY CUELLAR   11/14/2018 83yo female with pmhx of dementia and htn [facility] ems s/p fall. ems reports pt tripped over someone else's walker, pt walks without assistance at baseline. pt found to be febrile on arrival 100.9. pt without complaints.   pmd: irving    Pt was found to have DVT and was started on full dose lovenox 11/14 She was also found to have patchy pl based opacity rml     PROBLEM SPECIFIC PATIENT DATA   DVT  11/14/2018 V duplex R leg DVT Rle common fem vein to popliteal vein   11/14/2018 CTA Ch No pe Linear scarring or atelectasis bases Pleural based opacity rml  11/14/2018 CXR napd    Lovenox 50.2 (11/14)     PROBLEM SPECIFIC PATIENT DATA   PNEUMONIA  11/14/2018 CTA Ch No pe Linear scarring or atelectasis bases Pleural based opacity rml  11/14/2018 CXR napd    PROCALCITONIN  11/15/2018 pct n   vanco 1 (11/14)   Zosyn (11/14)     ASSESSMENT/RECOMMENDATIONS  11/14/2018 ADMISSION [facility] P DR LEVY CUELLAR    DVT   11/15/2018 Patient on lovenox Will have to consider changing to Xarelto 15 bid if Creatinine not increasing   Also need to consider if she is high fall risk then we may have to do IVCF     ELEVATED CREAT   Monitor serially     PATCHY OPACITY RML ON CT CHEST DONE 11/14  This could be a pulmonary infarct Will dw IR to see if ctnb is feasible Will need serial followup and will need PET after DC     PNEUMONIA   Bacterial pneumonia unlikely in absence of procalcitonin leukocytosis and fever Will DC abio (11/15/2018)    TIME SPENT Over 55 minutes aggregate care time spent on encounter; activities included   direct patient care, counseling and/or coordinating care reviewing notes, lab data/ imaging , discussion with multidisciplinary team/ patient  /family. Risks, benefits, alternatives  discussed in detail.
s/p fall   dvt   hypertension - controlled  dementia  to continue lovenox   consider oac
pt is a 83yo female with [facility]x of dementia [facility]. ems reports pt tripped over someone else's walker, pt walks without assistance at baseline. pt found to carmen ,

## 2018-11-28 NOTE — PROGRESS NOTE ADULT - SUBJECTIVE AND OBJECTIVE BOX
Patient is a 82y Female with a known history of :  Constipation (K59.00)  Delirium (R41.0)  ANNY (acute kidney injury) (N17.9)  Prophylactic measure (Z29.9)  Anxiety (F41.9)  Dementia (F03.90)  Depression (F32.9)  HTN (hypertension) (I10)  PNA (pneumonia) (J18.9)  DVT (deep venous thrombosis) (I82.409)  Fall (W19.XXXA)  Suspected deep vein thrombosis ([id number])    HPI:  pt is a 81yo female with pmhx of dementia and htn [facility] ems s/p fall. ems reports pt tripped over someone else's walker, pt walks without assistance at baseline. pt found to be febrile on arrival 100.9 Admitted for septic workup and evaluation,send blood and urine cx,serial lactate levels,monitor vitals closley,ivfs hydration,monitor urine output and renal profile,iv abx initiated  pt without complaints. Daughter is at bedside and states patient has a steady gait Found to have DVT and started on full dose AC (14 Nov 2018 21:23)      REVIEW OF SYSTEMS:    CONSTITUTIONAL: No fever, weight loss, or fatigue  EYES: No eye pain, visual disturbances, or discharge  ENMT:  No difficulty hearing, tinnitus, vertigo; No sinus or throat pain  NECK: No pain or stiffness  BREASTS: No pain, masses, or nipple discharge  RESPIRATORY: No cough, wheezing, chills or hemoptysis; No shortness of breath  CARDIOVASCULAR: No chest pain, palpitations, dizziness, or leg swelling  GASTROINTESTINAL: No abdominal or epigastric pain. No nausea, vomiting, or hematemesis; No diarrhea or constipation. No melena or hematochezia.  GENITOURINARY: No dysuria, frequency, hematuria, or incontinence  NEUROLOGICAL: No headaches, memory loss, loss of strength, numbness, or tremors  SKIN: No itching, burning, rashes, or lesions   LYMPH NODES: No enlarged glands  ENDOCRINE: No heat or cold intolerance; No hair loss  MUSCULOSKELETAL: No joint pain or swelling; No muscle, back, or extremity pain  PSYCHIATRIC: No depression, anxiety, mood swings, or difficulty sleeping  HEME/LYMPH: No easy bruising, or bleeding gums  ALLERGY AND IMMUNOLOGIC: No hives or eczema    MEDICATIONS  (STANDING):  dextrose 5%. 1000 milliLiter(s) (75 mL/Hr) IV Continuous <Continuous>  docusate sodium 100 milliGRAM(s) Oral two times a day  enoxaparin Injectable 50 milliGRAM(s) SubCutaneous every 24 hours  mirtazapine 15 milliGRAM(s) Oral at bedtime  pantoprazole    Tablet 40 milliGRAM(s) Oral before breakfast  polyethylene glycol 3350 17 Gram(s) Oral daily  sertraline 25 milliGRAM(s) Oral daily  sertraline 50 milliGRAM(s) Oral daily    MEDICATIONS  (PRN):  acetaminophen   Tablet .. 650 milliGRAM(s) Oral every 6 hours PRN Temp greater or equal to 38C (100.4F), Mild Pain (1 - 3)  bisacodyl Suppository 10 milliGRAM(s) Rectal daily PRN Constipation  morphine Concentrate 2.5 milliGRAM(s) SubLingual every 6 hours PRN Moderate Pain (4 - 6) or dyspnea  OLANZapine Injectable 5 milliGRAM(s) IntraMuscular every 6 hours PRN Acute agitation      ALLERGIES: No Known Allergies      FAMILY HISTORY:  No pertinent family history in first degree relatives      PHYSICAL EXAMINATION:  -----------------------------  T(C): 36.6 (11-28-18 @ 05:10), Max: 36.7 (11-27-18 @ 21:42)  HR: 84 (11-28-18 @ 05:10) (84 - 102)  BP: 120/68 (11-28-18 @ 05:10) (120/68 - 148/78)  RR: 16 (11-28-18 @ 05:10) (16 - 17)  SpO2: 94% (11-28-18 @ 05:10) (94% - 95%)  Wt(kg): --    11-27 @ 07:01  -  11-28 @ 07:00  --------------------------------------------------------  IN:    dextrose 5%.: 1085 mL  Total IN: 1085 mL    OUT:    Indwelling Catheter - Urethral: 600 mL    Voided: 2000 mL  Total OUT: 2600 mL    Total NET: -1515 mL            Constitutional: well developed, normal appearance, well groomed, well nourished, no deformities and no acute distress.   Eyes: the conjunctiva exhibited no abnormalities and the eyelids demonstrated no xanthelasmas.   HEENT: normal oral mucosa, no oral pallor and no oral cyanosis.   Neck: normal jugular venous A waves present, normal jugular venous V waves present and no jugular venous lao A waves.   Pulmonary: no respiratory distress, normal respiratory rhythm and effort, no accessory muscle use and lungs were clear to auscultation bilaterally.   Cardiovascular: heart rate and rhythm were normal, normal S1 and S2 and no murmur, gallop, rub, heave or thrill are present.   Abdomen: soft, non-tender, no hepato-splenomegaly and no abdominal mass palpated.   Musculoskeletal: the gait could not be assessed..   Extremities: no clubbing of the fingernails, no localized cyanosis, no petechial hemorrhages and no ischemic changes.   Skin: normal skin color and pigmentation, no rash, no venous stasis, no skin lesions, no skin ulcer and no xanthoma was observed.   Psychiatric: oriented to person, place, and time, the affect was normal, the mood was normal and not feeling anxious.     LABS:   --------  11-28    153<H>  |  116<H>  |  81<H>  ----------------------------<  174<H>  3.9   |  28  |  2.90<H>    Ca    9.0      28 Nov 2018 10:06                           11.3   10.94 )-----------( 319      ( 28 Nov 2018 10:06 )             36.5                 RADIOLOGY:  -----------------        ECG:

## 2018-11-28 NOTE — CONSULT NOTE ADULT - SUBJECTIVE AND OBJECTIVE BOX
Patient is a 82y Female whom presented to the hospital with carmen     PAST MEDICAL & SURGICAL HISTORY:  Anxiety  HTN (hypertension)  Depression  Dementia  No significant past surgical history      MEDICATIONS  (STANDING):  dextrose 5%. 1000 milliLiter(s) (100 mL/Hr) IV Continuous <Continuous>  docusate sodium 100 milliGRAM(s) Oral two times a day  enoxaparin Injectable 50 milliGRAM(s) SubCutaneous every 24 hours  mirtazapine 15 milliGRAM(s) Oral at bedtime  polyethylene glycol 3350 17 Gram(s) Oral daily  sertraline 25 milliGRAM(s) Oral daily  sertraline 50 milliGRAM(s) Oral daily      Allergies    No Known Allergies    Intolerances        SOCIAL HISTORY:  Denies ETOh,Smoking,     FAMILY HISTORY:  No pertinent family history in first degree relatives      REVIEW OF SYSTEMS:  unable to get a good ros     VITAL:  T(C): , Max: 36.7 (11-27-18 @ 21:42)  T(F): , Max: 98 (11-27-18 @ 21:42)  HR: 84 (11-28-18 @ 05:10)  BP: 120/68 (11-28-18 @ 05:10)  BP(mean): --  RR: 16 (11-28-18 @ 05:10)  SpO2: 94% (11-28-18 @ 05:10)  Wt(kg): --    I and O's:    11-27 @ 07:01  -  11-28 @ 07:00  --------------------------------------------------------  IN: 1085 mL / OUT: 2600 mL / NET: -1515 mL    11-28 @ 07:01  -  11-28 @ 19:50  --------------------------------------------------------  IN: 1690 mL / OUT: 0 mL / NET: 1690 mL          PHYSICAL EXAM:    Constitutional: NAD  HEENT: conjunctive   clear   Neck:  No JVD  Respiratory: decrease bs b/l   Cardiovascular: S1 and S2  Gastrointestinal: BS+, soft, NT/ND  Extremities: trace  peripheral edema  Neurological:  no focal deficits  Psychiatric: unable to obtained   Skin: dry   Access: Not applicable    LABS:                        11.3   10.94 )-----------( 319      ( 28 Nov 2018 10:06 )             36.5     11-28    153<H>  |  116<H>  |  81<H>  ----------------------------<  174<H>  3.9   |  28  |  2.90<H>    Ca    9.0      28 Nov 2018 10:06        Urine Studies:          RADIOLOGY & ADDITIONAL STUDIES:

## 2018-11-28 NOTE — PROGRESS NOTE ADULT - SUBJECTIVE AND OBJECTIVE BOX
PROGRESS NOTE  Patient is a 82y old  Female who presents with a chief complaint of pt is a 83yo female with pmhx of dementia and htn [facility] ems s/p fall. ems reports pt tripped over someone else's walker, pt walks without assistance at baseline. pt found to be febrile on arrival 100.9 Admitted for septic workup and evaluation,send blood and urine cx,serial lactate levels,monitor vitals closley,ivfs hydration,monitor urine output and renal profile,iv abx initiated  pt without complaints. Daughter is at bedside and states patient has a steady gait Found to have DVT and started on full dose AC (28 Nov 2018 12:48)  Chart and available morning labs /imaging are reviewed electronically , urgent issues addressed . More information  is being added upon completion of rounds , when more information is collected and management discussed with consultants , medical staff and social service/case management on the floor   OVERNIGHT  No new issues reported by medical staff . All above noted Patient is resting in a bed comfortably .Confused ,poor mentation .No distress noted As per med staff patient was c/o left hip and pelvic pain during diaper change Renal profile is noted and case d/w Dr Olivares , kidney ordered .daughter JESSICA requests workup and tx of ANNY to be provided prior to d/c from the hospital with [facility] . SW IS INFORMED  HPI:  pt is a 83yo female with pmhx of dementia and htn [facility] ems s/p fall. ems reports pt tripped over someone else's walker, pt walks without assistance at baseline. pt found to be febrile on arrival 100.9 Admitted for septic workup and evaluation,send blood and urine cx,serial lactate levels,monitor vitals closley,ivfs hydration,monitor urine output and renal profile,iv abx initiated  pt without complaints. Daughter is at bedside and states patient has a steady gait Found to have DVT and started on full dose AC (14 Nov 2018 21:23)  PAST MEDICAL & SURGICAL HISTORY:  Anxiety  HTN (hypertension)  Depression  Dementia  No significant past surgical history      MEDICATIONS  (STANDING):  dextrose 5%. 1000 milliLiter(s) (75 mL/Hr) IV Continuous <Continuous>  docusate sodium 100 milliGRAM(s) Oral two times a day  enoxaparin Injectable 50 milliGRAM(s) SubCutaneous every 24 hours  mirtazapine 15 milliGRAM(s) Oral at bedtime  polyethylene glycol 3350 17 Gram(s) Oral daily  sertraline 25 milliGRAM(s) Oral daily  sertraline 50 milliGRAM(s) Oral daily    MEDICATIONS  (PRN):  acetaminophen   Tablet .. 650 milliGRAM(s) Oral every 6 hours PRN Temp greater or equal to 38C (100.4F), Mild Pain (1 - 3)  bisacodyl Suppository 10 milliGRAM(s) Rectal daily PRN Constipation  morphine Concentrate 2.5 milliGRAM(s) SubLingual every 6 hours PRN Moderate Pain (4 - 6) or dyspnea  OLANZapine Injectable 5 milliGRAM(s) IntraMuscular every 6 hours PRN Acute agitation      OBJECTIVE    T(C): 36.6 (11-28-18 @ 05:10), Max: 36.7 (11-27-18 @ 21:42)  HR: 84 (11-28-18 @ 05:10) (84 - 102)  BP: 120/68 (11-28-18 @ 05:10) (120/68 - 148/78)  RR: 16 (11-28-18 @ 05:10) (16 - 17)  SpO2: 94% (11-28-18 @ 05:10) (94% - 95%)  Wt(kg): --  I&O's Summary    27 Nov 2018 07:01  -  28 Nov 2018 07:00  --------------------------------------------------------  IN: 1085 mL / OUT: 2600 mL / NET: -1515 mL    28 Nov 2018 07:01  -  28 Nov 2018 13:32  --------------------------------------------------------  IN: 640 mL / OUT: 0 mL / NET: 640 mL          REVIEW OF SYSTEMS:  ROS is unobtainable due to lethargy and confusion     PHYSICAL EXAM:  Appearance: NAD. VS past 24 hrs -as above   HEENT:   Moist oral mucosa. Conjunctiva clear b/l.   Neck : supple  Respiratory: Lungs CTAB.  Gastrointestinal:  Soft, nontender. No rebound. No rigidity. BS present	  Cardiovascular: RRR ,S1S2 present  Neurologic: Non-focal. Moving all extremities.  Extremities: No edema. No erythema. No calf tenderness.  Skin: No rashes, No ecchymoses, No cyanosis.	  wounds ,skin lesions-See skin assesment flow sheet   LABS:                        11.3   10.94 )-----------( 319      ( 28 Nov 2018 10:06 )             36.5     11-28    153<H>  |  116<H>  |  81<H>  ----------------------------<  174<H>  3.9   |  28  |  2.90<H>    Ca    9.0      28 Nov 2018 10:06      CAPILLARY BLOOD GLUCOSE              RADIOLOGY & ADDITIONAL TESTS:   reviewed elctronically  ASSESSMENT/PLAN:

## 2018-11-29 LAB
ALBUMIN SERPL ELPH-MCNC: 2.2 G/DL — LOW (ref 3.3–5)
ALP SERPL-CCNC: 118 U/L — SIGNIFICANT CHANGE UP (ref 40–120)
ALT FLD-CCNC: 36 U/L — SIGNIFICANT CHANGE UP (ref 12–78)
ANION GAP SERPL CALC-SCNC: 7 MMOL/L — SIGNIFICANT CHANGE UP (ref 5–17)
AST SERPL-CCNC: 44 U/L — HIGH (ref 15–37)
BILIRUB SERPL-MCNC: 0.4 MG/DL — SIGNIFICANT CHANGE UP (ref 0.2–1.2)
BUN SERPL-MCNC: 65 MG/DL — HIGH (ref 7–23)
CALCIUM SERPL-MCNC: 9.2 MG/DL — SIGNIFICANT CHANGE UP (ref 8.5–10.1)
CHLORIDE SERPL-SCNC: 111 MMOL/L — HIGH (ref 96–108)
CO2 SERPL-SCNC: 32 MMOL/L — HIGH (ref 22–31)
CREAT SERPL-MCNC: 1.8 MG/DL — HIGH (ref 0.5–1.3)
GLUCOSE SERPL-MCNC: 110 MG/DL — HIGH (ref 70–99)
HCT VFR BLD CALC: 33 % — LOW (ref 34.5–45)
HGB BLD-MCNC: 10.3 G/DL — LOW (ref 11.5–15.5)
MCHC RBC-ENTMCNC: 31.2 GM/DL — LOW (ref 32–36)
MCHC RBC-ENTMCNC: 33.3 PG — SIGNIFICANT CHANGE UP (ref 27–34)
MCV RBC AUTO: 106.8 FL — HIGH (ref 80–100)
NRBC # BLD: 0 /100 WBCS — SIGNIFICANT CHANGE UP (ref 0–0)
PLATELET # BLD AUTO: 301 K/UL — SIGNIFICANT CHANGE UP (ref 150–400)
POTASSIUM SERPL-MCNC: 3.8 MMOL/L — SIGNIFICANT CHANGE UP (ref 3.5–5.3)
POTASSIUM SERPL-SCNC: 3.8 MMOL/L — SIGNIFICANT CHANGE UP (ref 3.5–5.3)
PROT SERPL-MCNC: 6.7 G/DL — SIGNIFICANT CHANGE UP (ref 6–8.3)
RBC # BLD: 3.09 M/UL — LOW (ref 3.8–5.2)
RBC # FLD: 14 % — SIGNIFICANT CHANGE UP (ref 10.3–14.5)
SODIUM SERPL-SCNC: 150 MMOL/L — HIGH (ref 135–145)
WBC # BLD: 10.6 K/UL — HIGH (ref 3.8–10.5)
WBC # FLD AUTO: 10.6 K/UL — HIGH (ref 3.8–10.5)

## 2018-11-29 RX ADMIN — Medication 100 MILLIGRAM(S): at 05:46

## 2018-11-29 RX ADMIN — Medication 100 MILLIGRAM(S): at 17:29

## 2018-11-29 RX ADMIN — MIRTAZAPINE 15 MILLIGRAM(S): 45 TABLET, ORALLY DISINTEGRATING ORAL at 21:33

## 2018-11-29 RX ADMIN — SERTRALINE 25 MILLIGRAM(S): 25 TABLET, FILM COATED ORAL at 11:38

## 2018-11-29 RX ADMIN — ENOXAPARIN SODIUM 50 MILLIGRAM(S): 100 INJECTION SUBCUTANEOUS at 14:54

## 2018-11-29 RX ADMIN — POLYETHYLENE GLYCOL 3350 17 GRAM(S): 17 POWDER, FOR SOLUTION ORAL at 11:38

## 2018-11-29 RX ADMIN — SERTRALINE 50 MILLIGRAM(S): 25 TABLET, FILM COATED ORAL at 11:38

## 2018-11-29 NOTE — PROGRESS NOTE ADULT - PROBLEM SELECTOR PROBLEM 7
Anxiety
Anxiety
Dementia
Anxiety
Dementia
Dementia
Anxiety
Anxiety
Dementia
Anxiety

## 2018-11-29 NOTE — PROGRESS NOTE ADULT - SUBJECTIVE AND OBJECTIVE BOX
Neurology follow up note    LYUBOV [id number]      Interval History:    Patient resting in bed    MEDICATIONS    acetaminophen   Tablet .. 650 milliGRAM(s) Oral every 6 hours PRN  bisacodyl Suppository 10 milliGRAM(s) Rectal daily PRN  dextrose 5%. 1000 milliLiter(s) IV Continuous <Continuous>  docusate sodium 100 milliGRAM(s) Oral two times a day  enoxaparin Injectable 50 milliGRAM(s) SubCutaneous every 24 hours  mirtazapine 15 milliGRAM(s) Oral at bedtime  morphine Concentrate 2.5 milliGRAM(s) SubLingual every 6 hours PRN  OLANZapine Injectable 5 milliGRAM(s) IntraMuscular every 6 hours PRN  polyethylene glycol 3350 17 Gram(s) Oral daily  sertraline 25 milliGRAM(s) Oral daily  sertraline 50 milliGRAM(s) Oral daily      Allergies    No Known Allergies    Intolerances            Vital Signs Last 24 Hrs  T(C): 37.2 (28 Nov 2018 21:05), Max: 37.2 (28 Nov 2018 21:05)  T(F): 99 (28 Nov 2018 21:05), Max: 99 (28 Nov 2018 21:05)  HR: 90 (28 Nov 2018 21:05) (90 - 90)  BP: 114/74 (28 Nov 2018 21:05) (114/74 - 114/74)  BP(mean): --  RR: 16 (28 Nov 2018 21:05) (16 - 16)  SpO2: 98% (28 Nov 2018 21:05) (98% - 98%)      REVIEW OF SYSTEMS:  Extremely limited secondary to the patient's baseline that she says one to two words and occasionally will articulate.    PHYSICAL EXAMINATION:     HEENT:  Head:  Positive trauma was noted over the left eye.    Eyes:  No scleral icterus.    Ears:  Hearing hard to evaluate.    NECK:  Supple.    RESPIRATORY:  Decreased breath sounds bilaterally.    CARDIOVASCULAR:  S1 and S2 are heard.    ABDOMEN:  Soft and nontender.    EXTREMITIES:  No clubbing or cyanosis were noted.      NEUROLOGIC:  The patient is awake and alert.    Extraocular movements were intact.  Positive blink to bilateral visual threat.    The patient would mumble, occasionally would say one word, was off and on following commands.  Motor:  With stimuli, did move all four extremities.  No focality was noted.    Tone:  Bilateral upper and lower was increased.            LABS:  CBC Full  -  ( 29 Nov 2018 08:49 )  WBC Count : 10.60 K/uL  Hemoglobin : 10.3 g/dL  Hematocrit : 33.0 %  Platelet Count - Automated : 301 K/uL  Mean Cell Volume : 106.8 fl  Mean Cell Hemoglobin : 33.3 pg  Mean Cell Hemoglobin Concentration : 31.2 gm/dL  Auto Neutrophil # : x  Auto Lymphocyte # : x  Auto Monocyte # : x  Auto Eosinophil # : x  Auto Basophil # : x  Auto Neutrophil % : x  Auto Lymphocyte % : x  Auto Monocyte % : x  Auto Eosinophil % : x  Auto Basophil % : x      11-29    150<H>  |  111<H>  |  65<H>  ----------------------------<  110<H>  3.8   |  32<H>  |  1.80<H>    Ca    9.2      29 Nov 2018 08:49    TPro  6.7  /  Alb  2.2<L>  /  TBili  0.4  /  DBili  x   /  AST  44<H>  /  ALT  36  /  AlkPhos  118  11-29    Hemoglobin A1C:     LIVER FUNCTIONS - ( 29 Nov 2018 08:49 )  Alb: 2.2 g/dL / Pro: 6.7 g/dL / ALK PHOS: 118 U/L / ALT: 36 U/L / AST: 44 U/L / GGT: x           Vitamin B12         RADIOLOGY    ANALYSIS AND PLAN:  An 82-year-old with an episode of fall, head trauma, and change in mental status.  1.	For episodes of fall, this appears to be a mechanical fall.  There is no clear history of epilepsy reported.  The examination was limited, but no signs to suggest a new cerebrovascular accident has ensued.  2.	For episode of head trauma, would recommend to continue neuro checks.  3.	For change in mental status, most likely secondary to dementia made worse in the hospital setting.  4.	For history of dementia, secondary to advanced symptoms, do not feel medications would be of any benefit.  The patient did try medications in the past.  5.	Spoke with the daughter, Marychuy, at [phone] 11/29/18  6.	Physical Therapy evaluation.  7.	Fall precautions.  8.	monitor intake  9.	h/o lethargy continue to hold Risperdal for now    10.	no new events noted   11.	awaiting placement   12.	monitor urine out put as needed  13.	Greater than 25  minutes of time was spent with the patient, plan of care, reviewing data, and speaking to the family and multidisciplinary healthcare team.    Thank you for the courtesy of this consultation. Neurology follow up note    LYUBOV [id number]      Interval History:    Patient resting in bed    MEDICATIONS    acetaminophen   Tablet .. 650 milliGRAM(s) Oral every 6 hours PRN  bisacodyl Suppository 10 milliGRAM(s) Rectal daily PRN  dextrose 5%. 1000 milliLiter(s) IV Continuous <Continuous>  docusate sodium 100 milliGRAM(s) Oral two times a day  enoxaparin Injectable 50 milliGRAM(s) SubCutaneous every 24 hours  mirtazapine 15 milliGRAM(s) Oral at bedtime  morphine Concentrate 2.5 milliGRAM(s) SubLingual every 6 hours PRN  OLANZapine Injectable 5 milliGRAM(s) IntraMuscular every 6 hours PRN  polyethylene glycol 3350 17 Gram(s) Oral daily  sertraline 25 milliGRAM(s) Oral daily  sertraline 50 milliGRAM(s) Oral daily      Allergies    No Known Allergies    Intolerances            Vital Signs Last 24 Hrs  T(C): 37.2 (28 Nov 2018 21:05), Max: 37.2 (28 Nov 2018 21:05)  T(F): 99 (28 Nov 2018 21:05), Max: 99 (28 Nov 2018 21:05)  HR: 90 (28 Nov 2018 21:05) (90 - 90)  BP: 114/74 (28 Nov 2018 21:05) (114/74 - 114/74)  BP(mean): --  RR: 16 (28 Nov 2018 21:05) (16 - 16)  SpO2: 98% (28 Nov 2018 21:05) (98% - 98%)      REVIEW OF SYSTEMS:  Extremely limited secondary to the patient's baseline that she says one to two words and occasionally will articulate.    PHYSICAL EXAMINATION:     HEENT:  Head:  Positive trauma was noted over the left eye.    Eyes:  No scleral icterus.    Ears:  Hearing hard to evaluate.    NECK:  Supple.    RESPIRATORY:  Decreased breath sounds bilaterally.    CARDIOVASCULAR:  S1 and S2 are heard.    ABDOMEN:  Soft and nontender.    EXTREMITIES:  No clubbing or cyanosis were noted.      NEUROLOGIC:  The patient is awake and alert.    Extraocular movements were intact.  Positive blink to bilateral visual threat.    The patient would mumble, occasionally would say one word, was off and on following commands.  Motor:  With stimuli, did move all four extremities.  No focality was noted.    Tone:  Bilateral upper and lower was increased.            LABS:  CBC Full  -  ( 29 Nov 2018 08:49 )  WBC Count : 10.60 K/uL  Hemoglobin : 10.3 g/dL  Hematocrit : 33.0 %  Platelet Count - Automated : 301 K/uL  Mean Cell Volume : 106.8 fl  Mean Cell Hemoglobin : 33.3 pg  Mean Cell Hemoglobin Concentration : 31.2 gm/dL  Auto Neutrophil # : x  Auto Lymphocyte # : x  Auto Monocyte # : x  Auto Eosinophil # : x  Auto Basophil # : x  Auto Neutrophil % : x  Auto Lymphocyte % : x  Auto Monocyte % : x  Auto Eosinophil % : x  Auto Basophil % : x      11-29    150<H>  |  111<H>  |  65<H>  ----------------------------<  110<H>  3.8   |  32<H>  |  1.80<H>    Ca    9.2      29 Nov 2018 08:49    TPro  6.7  /  Alb  2.2<L>  /  TBili  0.4  /  DBili  x   /  AST  44<H>  /  ALT  36  /  AlkPhos  118  11-29    Hemoglobin A1C:     LIVER FUNCTIONS - ( 29 Nov 2018 08:49 )  Alb: 2.2 g/dL / Pro: 6.7 g/dL / ALK PHOS: 118 U/L / ALT: 36 U/L / AST: 44 U/L / GGT: x           Vitamin B12         RADIOLOGY    ANALYSIS AND PLAN:  An 82-year-old with an episode of fall, head trauma, and change in mental status.  1.	For episodes of fall, this appears to be a mechanical fall.  There is no clear history of epilepsy reported.  The examination was limited, but no signs to suggest a new cerebrovascular accident has ensued.  2.	For episode of head trauma, would recommend to continue neuro checks.  3.	For change in mental status, most likely secondary to dementia made worse in the hospital setting.  4.	For history of dementia, secondary to advanced symptoms, do not feel medications would be of any benefit.  The patient did try medications in the past.  5.	Physical Therapy evaluation.  6.	Fall precautions.  7.	monitor intake  8.	h/o lethargy continue to hold Risperdal for now    9.	no new events noted   10.	awaiting placement   11.	monitor urine out put as needed  12.	Spoke with the daughter, Marychuy, at [phone] 11/29/18 called other daughter [phone] no response   13.	Greater than 25  minutes of time was spent with the patient, plan of care, reviewing data, and speaking to the family and multidisciplinary healthcare team.    Thank you for the courtesy of this consultation. Neurology follow up note    LYUBOV [id number]      Interval History:    Patient resting in bed    MEDICATIONS    acetaminophen   Tablet .. 650 milliGRAM(s) Oral every 6 hours PRN  bisacodyl Suppository 10 milliGRAM(s) Rectal daily PRN  dextrose 5%. 1000 milliLiter(s) IV Continuous <Continuous>  docusate sodium 100 milliGRAM(s) Oral two times a day  enoxaparin Injectable 50 milliGRAM(s) SubCutaneous every 24 hours  mirtazapine 15 milliGRAM(s) Oral at bedtime  morphine Concentrate 2.5 milliGRAM(s) SubLingual every 6 hours PRN  OLANZapine Injectable 5 milliGRAM(s) IntraMuscular every 6 hours PRN  polyethylene glycol 3350 17 Gram(s) Oral daily  sertraline 25 milliGRAM(s) Oral daily  sertraline 50 milliGRAM(s) Oral daily      Allergies    No Known Allergies    Intolerances            Vital Signs Last 24 Hrs  T(C): 37.2 (28 Nov 2018 21:05), Max: 37.2 (28 Nov 2018 21:05)  T(F): 99 (28 Nov 2018 21:05), Max: 99 (28 Nov 2018 21:05)  HR: 90 (28 Nov 2018 21:05) (90 - 90)  BP: 114/74 (28 Nov 2018 21:05) (114/74 - 114/74)  BP(mean): --  RR: 16 (28 Nov 2018 21:05) (16 - 16)  SpO2: 98% (28 Nov 2018 21:05) (98% - 98%)      REVIEW OF SYSTEMS:  Extremely limited secondary to the patient's baseline that she says one to two words and occasionally will articulate.    PHYSICAL EXAMINATION:     HEENT:  Head:  Positive trauma was noted over the left eye.    Eyes:  No scleral icterus.    Ears:  Hearing hard to evaluate.    NECK:  Supple.    RESPIRATORY:  Decreased breath sounds bilaterally.    CARDIOVASCULAR:  S1 and S2 are heard.    ABDOMEN:  Soft and nontender.    EXTREMITIES:  No clubbing or cyanosis were noted.      NEUROLOGIC:  The patient is awake and alert.    Extraocular movements were intact.  Positive blink to bilateral visual threat.    The patient would mumble, occasionally would say one word, was off and on following commands.  Motor:  With stimuli, did move all four extremities.  No focality was noted.    Tone:  Bilateral upper and lower was increased.            LABS:  CBC Full  -  ( 29 Nov 2018 08:49 )  WBC Count : 10.60 K/uL  Hemoglobin : 10.3 g/dL  Hematocrit : 33.0 %  Platelet Count - Automated : 301 K/uL  Mean Cell Volume : 106.8 fl  Mean Cell Hemoglobin : 33.3 pg  Mean Cell Hemoglobin Concentration : 31.2 gm/dL  Auto Neutrophil # : x  Auto Lymphocyte # : x  Auto Monocyte # : x  Auto Eosinophil # : x  Auto Basophil # : x  Auto Neutrophil % : x  Auto Lymphocyte % : x  Auto Monocyte % : x  Auto Eosinophil % : x  Auto Basophil % : x      11-29    150<H>  |  111<H>  |  65<H>  ----------------------------<  110<H>  3.8   |  32<H>  |  1.80<H>    Ca    9.2      29 Nov 2018 08:49    TPro  6.7  /  Alb  2.2<L>  /  TBili  0.4  /  DBili  x   /  AST  44<H>  /  ALT  36  /  AlkPhos  118  11-29    Hemoglobin A1C:     LIVER FUNCTIONS - ( 29 Nov 2018 08:49 )  Alb: 2.2 g/dL / Pro: 6.7 g/dL / ALK PHOS: 118 U/L / ALT: 36 U/L / AST: 44 U/L / GGT: x           Vitamin B12         RADIOLOGY    ANALYSIS AND PLAN:  An 82-year-old with an episode of fall, head trauma, and change in mental status.  1.	For episodes of fall, this appears to be a mechanical fall.  There is no clear history of epilepsy reported.  The examination was limited, but no signs to suggest a new cerebrovascular accident has ensued.  2.	For episode of head trauma, would recommend to continue neuro checks.  3.	For change in mental status, most likely secondary to dementia made worse in the hospital setting.  4.	For history of dementia, secondary to advanced symptoms, do not feel medications would be of any benefit.  The patient did try medications in the past.  5.	Physical Therapy evaluation.  6.	Fall precautions.  7.	monitor intake  8.	h/o lethargy continue to hold Risperdal for now    9.	no new events noted   10.	awaiting placement   11.	monitor urine out put as needed  12.	Spoke with the daughter, Marychuy, at [phone] 11/29/18 called other daughter  Tameka Roblero.	Greater than 25  minutes of time was spent with the patient, plan of care, reviewing data, and speaking to the family and multidisciplinary healthcare team.    Thank you for the courtesy of this consultation.

## 2018-11-29 NOTE — PROGRESS NOTE ADULT - PROBLEM SELECTOR PROBLEM 5
Depression
Depression
Fall
HTN (hypertension)
HTN (hypertension)
Depression
HTN (hypertension)
HTN (hypertension)
Depression
Depression
Fall
Depression

## 2018-11-29 NOTE — PROGRESS NOTE ADULT - PROBLEM SELECTOR PLAN 9
Gastrointestinal stress ulcer prophylaxis l and DVT prophylaxis administrated

## 2018-11-29 NOTE — PROGRESS NOTE ADULT - SUBJECTIVE AND OBJECTIVE BOX
Patient is a 82y Female with a known history of :  Aspiration pneumonia of left lower lobe due to regurgitated food (J69.0)  Hypernatremia (E87.0)  Constipation (K59.00)  Delirium (R41.0)  ANNY (acute kidney injury) (N17.9)  Prophylactic measure (Z29.9)  Anxiety (F41.9)  Dementia (F03.90)  Depression (F32.9)  HTN (hypertension) (I10)  PNA (pneumonia) (J18.9)  DVT (deep venous thrombosis) (I82.409)  Fall (W19.XXXA)  Suspected deep vein thrombosis ([id number])    HPI:  pt is a 83yo female with pmhx of dementia and htn [facility] ems s/p fall. ems reports pt tripped over someone else's walker, pt walks without assistance at baseline. pt found to be febrile on arrival 100.9 Admitted for septic workup and evaluation,send blood and urine cx,serial lactate levels,monitor vitals closley,ivfs hydration,monitor urine output and renal profile,iv abx initiated  pt without complaints. Daughter is at bedside and states patient has a steady gait Found to have DVT and started on full dose AC (14 Nov 2018 21:23)      REVIEW OF SYSTEMS:    CONSTITUTIONAL: No fever, weight loss, or fatigue  EYES: No eye pain, visual disturbances, or discharge  ENMT:  No difficulty hearing, tinnitus, vertigo; No sinus or throat pain  NECK: No pain or stiffness  BREASTS: No pain, masses, or nipple discharge  RESPIRATORY: No cough, wheezing, chills or hemoptysis; No shortness of breath  CARDIOVASCULAR: No chest pain, palpitations, dizziness, or leg swelling  GASTROINTESTINAL: No abdominal or epigastric pain. No nausea, vomiting, or hematemesis; No diarrhea or constipation. No melena or hematochezia.  GENITOURINARY: No dysuria, frequency, hematuria, or incontinence  NEUROLOGICAL: No headaches, memory loss, loss of strength, numbness, or tremors  SKIN: No itching, burning, rashes, or lesions   LYMPH NODES: No enlarged glands  ENDOCRINE: No heat or cold intolerance; No hair loss  MUSCULOSKELETAL: No joint pain or swelling; No muscle, back, or extremity pain  PSYCHIATRIC: No depression, anxiety, mood swings, or difficulty sleeping  HEME/LYMPH: No easy bruising, or bleeding gums  ALLERGY AND IMMUNOLOGIC: No hives or eczema    MEDICATIONS  (STANDING):  dextrose 5%. 1000 milliLiter(s) (100 mL/Hr) IV Continuous <Continuous>  docusate sodium 100 milliGRAM(s) Oral two times a day  enoxaparin Injectable 50 milliGRAM(s) SubCutaneous every 24 hours  mirtazapine 15 milliGRAM(s) Oral at bedtime  polyethylene glycol 3350 17 Gram(s) Oral daily  sertraline 25 milliGRAM(s) Oral daily  sertraline 50 milliGRAM(s) Oral daily    MEDICATIONS  (PRN):  acetaminophen   Tablet .. 650 milliGRAM(s) Oral every 6 hours PRN Temp greater or equal to 38C (100.4F), Mild Pain (1 - 3)  bisacodyl Suppository 10 milliGRAM(s) Rectal daily PRN Constipation  morphine Concentrate 2.5 milliGRAM(s) SubLingual every 6 hours PRN Moderate Pain (4 - 6) or dyspnea  OLANZapine Injectable 5 milliGRAM(s) IntraMuscular every 6 hours PRN Acute agitation      ALLERGIES: No Known Allergies      FAMILY HISTORY:  No pertinent family history in first degree relatives      PHYSICAL EXAMINATION:  -----------------------------  T(C): 37.2 (11-28-18 @ 21:05), Max: 37.2 (11-28-18 @ 21:05)  HR: 90 (11-28-18 @ 21:05) (90 - 90)  BP: 114/74 (11-28-18 @ 21:05) (114/74 - 114/74)  RR: 16 (11-28-18 @ 21:05) (16 - 16)  SpO2: 98% (11-28-18 @ 21:05) (98% - 98%)  Wt(kg): --    11-28 @ 07:01  -  11-29 @ 07:00  --------------------------------------------------------  IN:    dextrose 5%.: 450 mL    dextrose 5%.: 1800 mL    Oral Fluid: 700 mL  Total IN: 2950 mL    OUT:    Indwelling Catheter - Urethral: 1200 mL  Total OUT: 1200 mL    Total NET: 1750 mL            Constitutional: well developed, normal appearance, well groomed, well nourished, no deformities and no acute distress.   Eyes: the conjunctiva exhibited no abnormalities and the eyelids demonstrated no xanthelasmas.   HEENT: normal oral mucosa, no oral pallor and no oral cyanosis.   Neck: normal jugular venous A waves present, normal jugular venous V waves present and no jugular venous lao A waves.   Pulmonary: no respiratory distress, normal respiratory rhythm and effort, no accessory muscle use and lungs were clear to auscultation bilaterally.   Cardiovascular: heart rate and rhythm were normal, normal S1 and S2 and no murmur, gallop, rub, heave or thrill are present.   Abdomen: soft, non-tender, no hepato-splenomegaly and no abdominal mass palpated.   Musculoskeletal: the gait could not be assessed..   Extremities: no clubbing of the fingernails, no localized cyanosis, no petechial hemorrhages and no ischemic changes.   Skin: normal skin color and pigmentation, no rash, no venous stasis, no skin lesions, no skin ulcer and no xanthoma was observed.   Psychiatric: oriented to person, place, and time, the affect was normal, the mood was normal and not feeling anxious.     LABS:   --------  11-28    153<H>  |  116<H>  |  81<H>  ----------------------------<  174<H>  3.9   |  28  |  2.90<H>    Ca    9.0      28 Nov 2018 10:06                           11.3   10.94 )-----------( 319      ( 28 Nov 2018 10:06 )             36.5                 RADIOLOGY:  -----------------        ECG:

## 2018-11-29 NOTE — PROGRESS NOTE ADULT - PROBLEM SELECTOR PROBLEM 6
Dementia
Dementia
Depression
Depression
HTN (hypertension)
Dementia
Depression
Depression
Dementia
Dementia
HTN (hypertension)
Dementia

## 2018-11-29 NOTE — CONSULT NOTE ADULT - REASON FOR ADMISSION
pt is a 81yo female [facility] ems reports pt tripped over someone else's walker, pt walks without assistance at baseline. pt found to be febrile on arrival 100.9 Admitted for septic workup and evaluation,send blood and urine cx,serial lactate levels,monitor vitals closley,ivfs hydration,monitor urine output and renal profile,iv abx initiated  pt without complaints. Daughter is at bedside and states patient has a steady gait Found to have DVT and started on full dose AC
pt is a 83yo female [facility] ems reports pt tripped over someone else's walker, pt walks without assistance at baseline. pt found to be febrile on arrival 100.9 Admitted for septic workup and evaluation,send blood and urine cx,serial lactate levels,monitor vitals closley,ivfs hydration,monitor urine output and renal profile,iv abx initiated  pt without complaints. Daughter is at bedside and states patient has a steady gait Found to have DVT and started on full dose AC
pt is a 83yo female [facility] reports pt tripped over someone else's walker, pt walks without assistance at baseline. pt found to be febrile on arrival 100.9 Admitted for septic workup and evaluation,send blood and urine cx,serial lactate levels,monitor vitals closley,ivfs hydration,monitor urine output and renal profile,iv abx initiated  pt without complaints. Daughter is at bedside and states patient has a steady gait Found to have DVT and started on full dose AC
s/p fall   dvt
pt is a 81yo female [facility] em[facility] reports pt tripped over someone else's walker, pt walks without assistance at baseline. pt found to be febrile on arrival 100.9 Admitted for septic workup and evaluation,send blood and urine cx,serial lactate levels,monitor vitals closley,ivfs hydration,monitor urine output and renal profile,iv abx initiated  pt without complaints. Daughter is at bedside and states patient has a steady gait Found to have DVT and started on full dose AC

## 2018-11-29 NOTE — PROGRESS NOTE ADULT - PROBLEM SELECTOR PLAN 6
continue current managmnet and medications
ON CMO
continue current management and medications ,psych cons is appreciated ,as per Dr Dangelo patient is not stable for d/c home
continue current management and medications ,psych cons is appreciated ,as per Dr Dangelo patient is not stable for d/c home
continue current managmnet and medications
ON CMO
continue current management and medications ,psych cons is appreciated ,as per Dr Dangelo patient is not stable for d/c home
continue current managmnet and medications
ON CMO
continue current managmnet and medications
continue current managmnet and medications

## 2018-11-29 NOTE — PROGRESS NOTE ADULT - PROBLEM SELECTOR PLAN 5
continue current management and medications ,psych cons called
CHECK LEFT HIP AND PELVIC XRAY PRIOR TO D/C  , PAIN MANAGEMENT
continue current management and medications ,psych cons called
continue home medications
continue home medications
ON CMO
continue current management and medications ,psych cons is appreciated ,as per Dr Dangelo patient is not stable for d/c home
continue home medications
continue current management and medications ,psych cons is appreciated ,as per Dr Dangelo patient is not stable for d/c home
CHECK LEFT HIP AND PELVIC XRAY PRIOR TO D/C  , PAIN MANAGEMENT
continue current management and medications ,psych cons is appreciated ,as per Dr Dangelo patient is not stable for d/c home
continue current management and medications ,psych cons is appreciated ,as per Dr Dangelo patient is not stable for d/c home

## 2018-11-29 NOTE — PROGRESS NOTE ADULT - PROBLEM SELECTOR PLAN 8
Gastrointestinal stress ulcer prophylaxis l and DVT prophylaxis administrated
Gastrointestinal stress ulcer prophylaxis l and DVT prophylaxis administrated
constant observation renewed
constant observation renewed
continue current managmnet and medications
Gastrointestinal stress ulcer prophylaxis l and DVT prophylaxis administrated
constant observation renewed
continue current managmnet and medications
Gastrointestinal stress ulcer prophylaxis l and DVT prophylaxis administrated
Gastrointestinal stress ulcer prophylaxis l and DVT prophylaxis administrated
continue current managmnet and medications
Gastrointestinal stress ulcer prophylaxis l and DVT prophylaxis administrated

## 2018-11-29 NOTE — PROGRESS NOTE ADULT - PROBLEM SELECTOR PROBLEM 8
Prophylactic measure
Anxiety
Prophylactic measure
Anxiety
Anxiety
Prophylactic measure
Anxiety
Prophylactic measure
Prophylactic measure

## 2018-11-29 NOTE — PROGRESS NOTE ADULT - SUBJECTIVE AND OBJECTIVE BOX
Adriana Swanson [facility]   ALLERGY nka  CONTACT Dtr Tameka Campos  Self   REASON FOR VISIT Subsequent Pulmonary followup  Initial evaluation/Pulmonary consultation requested 11/14/2018 by Dr Olivares from Dr Khalil   Patient examined chart reviewed  HOSPITAL ADMISSION [facility]  P 11/14/2018   PATIENT CAME  FROM (if information available)      IVF  D5 100 (11/28)     VITALS/LABS         11/29/2018 99 90 114/74 16 98%   11/29/2018 W 10.6 Hb 10.3 Plt 301 Na 150 K 3.8 CO2 32 Cr 1.8                      GLOBAL ISSUE/BEST PRACTICE:      PROBLEM: HOB elevation:   y            PROBLEM: Stress ulcer proph:    protonix 40 911/14)                       PROBLEM: VTE prophylaxis:      lvnx 50.2 (11/14) ? Rivaroxaban 15.2 (11/17)  PROBLEM: Glycemic control:    na  PROBLEM: Nutrition:    watkins (11/14)   PROBLEM: Advanced directive: na     PROBLEM: Allergies:  na  EVENT 11/17/2018 DW fam in detail re pros and cons of doac v vka v ivcf   EVENT AGITATION 11/14/2018 Constant observation     REVIEW OF SYMPTOMS    Able to give ROS  NO     PHYSICAL EXAM    HEENT Unremarkable PERRLA atraumatic   RESP Fair air entry EXP prolonged    Harsh breath sound Resp distres mild   CARDIAC S1 S2 No S3     NO JVD    ABDOMEN SOFT BS PRESENT NOT DISTENDED No hepatosplenomegaly PEDAL EDEMA present No calf tenderness  NO rash   GENERAL Not TOXIC looking    PATIENT MANAGEMENT   11/14/2018 ADMISSION [facility] P DR LEVY TY   11/14/2018 81yo female with pmhx of dementia and htn [facility] ems s/p fall. ems reports pt tripped over someone else's walker, pt walks without assistance at baseline. pt found to be febrile on arrival 100.9. pt without complaints.   pmd: irving    Pt was found to have DVT and was started on full dose lovenox 11/14 She was also found to have patchy pl based opacity rml Case was dw IR and Dr Farah felt that ctnb should not be done but a repeat CT ch should be done in 2 m Dr Kaiser was consulted 11/17/2018 to decide re ivcf vs Xarelto and this was dw family in detail on 11/17/2018   Pt was seen by Dr Job and fam declined IVCF which was recommended   Pt was started on Xarelto Changed to Lovenox   11/27/2018 Hospice DC being planned   11/28/2018 On cmo   11/29/2018 On IVF     ASSESSMENT/RECOMMENDATIONS  11/14/2018 ADMISSION [facility] P DR LEVY TY    DVT   11/18/2018 Seen by Dr Kaiser ivcf recommended but daughter Mrs Campos declined   11/17/2018 Started on Xarelto 15.2 (11/17)   11/16/2018 DW Dr Ty and with family Pt is steady on her feet and this was a trip fall Pros and cons of doac explained to pt family Xarelto agreed upon by Dr Ty   11/14/2018 V duplex R leg DVT     ELEVATED CREAT   Monitored serially Improved     PATCHY OPACITY RML ON CT CHEST DONE 11/14  This could be a pulmonary infarct Will dw IR to see if ctnb is feasible Will need serial followup and will need PET after DC   11/16/2018 I roldan Farah He feels that instead of ctnb we should rep[eat CT Chest in 2 m Message sent to Dr Ty   11/24/2018 To take Xareltyo 15.2 x 21 d then Xarelto 20    PNEUMONIA   Bacterial pneumonia unlikely in absence of procalcitonin leukocytosis and fever  DCed abio (11/15/2018)   11/14 bc n 11/14 uc n     Awaits placement    TIME SPENT Over 25 minutes aggregate care time spent on encounter; activities included   direct patient care, counseling and/or coordinating care reviewing notes, lab data/ imaging , discussion with multidisciplinary team/ patient  /family. Risks, benefits, alternatives  discussed in detail.

## 2018-11-29 NOTE — CONSULT NOTE ADULT - SUBJECTIVE AND OBJECTIVE BOX
CHIEF COMPLAINT:  82y Female with chief complaint of          HISTORY OF PRESENT ILLNESS:      *************************************************************************************************  PAST MEDICAL & SURGICAL HISTORY:  Anxiety  HTN (hypertension)  Depression  Dementia  No significant past surgical history      MEDICATIONS  (STANDING):  dextrose 5%. 1000 milliLiter(s) (100 mL/Hr) IV Continuous <Continuous>  docusate sodium 100 milliGRAM(s) Oral two times a day  enoxaparin Injectable 50 milliGRAM(s) SubCutaneous every 24 hours  mirtazapine 15 milliGRAM(s) Oral at bedtime  polyethylene glycol 3350 17 Gram(s) Oral daily  sertraline 25 milliGRAM(s) Oral daily  sertraline 50 milliGRAM(s) Oral daily    MEDICATIONS  (PRN):  acetaminophen   Tablet .. 650 milliGRAM(s) Oral every 6 hours PRN Temp greater or equal to 38C (100.4F), Mild Pain (1 - 3)  bisacodyl Suppository 10 milliGRAM(s) Rectal daily PRN Constipation  morphine Concentrate 2.5 milliGRAM(s) SubLingual every 6 hours PRN Moderate Pain (4 - 6) or dyspnea  OLANZapine Injectable 5 milliGRAM(s) IntraMuscular every 6 hours PRN Acute agitation      ALLERGIES:  No Known Allergies      SOCIAL HISTORY:          ETOH:                                  Smoking:                                   Drugs:                                         Occupation:    FAMILY HISTORY:  No pertinent family history in first degree relatives      CONSTITUTIONAL: No weakness, fevers or chills    EYES/ENT: No visual changes;  No vertigo or throat pain     NECK: No pain or stiffness    RESPIRATORY: No cough, wheezing, hemoptysis; No shortness of breath    CARDIOVASCULAR: No chest pain or palpitations    GASTROINTESTINAL: No abdominal or epigastric pain. No nausea, vomiting, or hematemesis; No diarrhea or constipation. No melena or hematochezia.    GENITOURINARY:     NEUROLOGICAL: No numbness or weakness    SKIN: No itching, burning, rashes, or lesions     All other review of systems is negative unless indicated above.    ****************************************************************************************************  PHYSICAL EXAM:    Vital Signs Last 24 Hrs  T(C): 37.2 (28 Nov 2018 21:05), Max: 37.2 (28 Nov 2018 21:05)  T(F): 99 (28 Nov 2018 21:05), Max: 99 (28 Nov 2018 21:05)  HR: 90 (28 Nov 2018 21:05) (90 - 90)  BP: 114/74 (28 Nov 2018 21:05) (114/74 - 114/74)  BP(mean): --  RR: 16 (28 Nov 2018 21:05) (16 - 16)  SpO2: 98% (28 Nov 2018 21:05) (98% - 98%)    GENERAL:    PENIS:    TESTES:    PROSTATE/ RECTAL:    ABDOMEN:    BACK:    LOWER EXTREMITIES:    NEUROLOGICAL:      *******************************************************************************************************  LABS:                        10.3   10.60 )-----------( 301      ( 29 Nov 2018 08:49 )             33.0     11-29    150<H>  |  111<H>  |  65<H>  ----------------------------<  110<H>  3.8   |  32<H>  |  1.80<H>    Ca    9.2      29 Nov 2018 08:49    TPro  6.7  /  Alb  2.2<L>  /  TBili  0.4  /  DBili  x   /  AST  44<H>  /  ALT  36  /  AlkPhos  118  11-29        Urine Culture:     RADIOLOGY & ADDITIONAL STUDIES:    EXAM:  US KIDNEYS AND BLADDER                            PROCEDURE DATE:  11/28/2018          INTERPRETATION:  CLINICAL INFORMATION: ANNY. Evaluate for hydronephrosis.    COMPARISON: No prior ultrasound examinations of the kidneys are available   for comparison.    TECHNIQUE: Sonography of the kidneys and bladder.     FINDINGS:    Right kidney:  8.6 cm. There is mild right-sided hydroureteronephrosis.   There is a 1.3 cm cyst within the upper pole.    Left kidney:  9.2 cm. No renal mass, hydronephrosis or calculi.    Urinary bladder: Within normal limits.    Incidental note is made of gallbladder sludge.    The patient is status post Guzman catheter placement with the Guzman   balloon inside a decompressed urinary bladder.    IMPRESSION: Mild right-sided hydroureteronephrosis. No left-sided   hydronephrosis.    Status post Guzman catheter placement with the Guzman balloon inside a   mostly decompressed urinary bladder.                KHURRAM KEITA M.D., ATTENDING RADIOLOGIST  This document has been electronically signed. Nov 28 2018  2:57PM                *********************************************************************************************************  IMPRESSION:    PLAN: CHIEF COMPLAINT:  82y Female with chief complaint of rt hydro         HISTORY OF PRESENT ILLNESS  · Reason for Admission	pt is a 83yo female with pmhx of dementia and htn [facility] ems s/p fall. ems reports pt tripped over someone else's walker, pt walks without assistance at baseline. pt found to be febrile on arrival 100.9 Admitted for septic workup and evaluation,send blood and urine cx,serial lactate levels,monitor vitals closley,ivfs hydration,monitor urine output and renal profile,iv abx initiated  pt without complaints. Daughter is at bedside and states patient has a steady gait Found to have DVT and started on full dose AC	    *************************************************************************************************  PAST MEDICAL & SURGICAL HISTORY:  Anxiety  HTN (hypertension)  Depression  Dementia  No significant past surgical history      MEDICATIONS  (STANDING):  dextrose 5%. 1000 milliLiter(s) (100 mL/Hr) IV Continuous <Continuous>  docusate sodium 100 milliGRAM(s) Oral two times a day  enoxaparin Injectable 50 milliGRAM(s) SubCutaneous every 24 hours  mirtazapine 15 milliGRAM(s) Oral at bedtime  polyethylene glycol 3350 17 Gram(s) Oral daily  sertraline 25 milliGRAM(s) Oral daily  sertraline 50 milliGRAM(s) Oral daily    MEDICATIONS  (PRN):  acetaminophen   Tablet .. 650 milliGRAM(s) Oral every 6 hours PRN Temp greater or equal to 38C (100.4F), Mild Pain (1 - 3)  bisacodyl Suppository 10 milliGRAM(s) Rectal daily PRN Constipation  morphine Concentrate 2.5 milliGRAM(s) SubLingual every 6 hours PRN Moderate Pain (4 - 6) or dyspnea  OLANZapine Injectable 5 milliGRAM(s) IntraMuscular every 6 hours PRN Acute agitation      ALLERGIES:  No Known Allergies      SOCIAL HISTORY:          ETOH:                                  Smoking:                                   Drugs:                                         Occupation:    FAMILY HISTORY:  No pertinent family history in first degree relatives      CONSTITUTIONAL: No weakness, fevers or chills    EYES/ENT: No visual changes;  No vertigo or throat pain     NECK: No pain or stiffness    RESPIRATORY: No cough, wheezing, hemoptysis; No shortness of breath    CARDIOVASCULAR: No chest pain or palpitations    GASTROINTESTINAL: No abdominal or epigastric pain. No nausea, vomiting, or hematemesis; No diarrhea or constipation. No melena or hematochezia.    GENITOURINARY: dememtia so unable to respond to questions     NEUROLOGICAL: No numbness or weakness    SKIN: No itching, burning, rashes, or lesions     All other review of systems is negative unless indicated above.    ****************************************************************************************************  PHYSICAL EXAM:    Vital Signs Last 24 Hrs  T(C): 37.2 (28 Nov 2018 21:05), Max: 37.2 (28 Nov 2018 21:05)  T(F): 99 (28 Nov 2018 21:05), Max: 99 (28 Nov 2018 21:05)  HR: 90 (28 Nov 2018 21:05) (90 - 90)  BP: 114/74 (28 Nov 2018 21:05) (114/74 - 114/74)  BP(mean): --  RR: 16 (28 Nov 2018 21:05) (16 - 16)  SpO2: 98% (28 Nov 2018 21:05) (98% - 98%)    GENERAL: dementia     ABDOMEN: soft     BACK: no cva tenderness     LOWER EXTREMITIES: no edema     NEUROLOGICAL:      *******************************************************************************************************  LABS:                        10.3   10.60 )-----------( 301      ( 29 Nov 2018 08:49 )             33.0     11-29    150<H>  |  111<H>  |  65<H>  ----------------------------<  110<H>  3.8   |  32<H>  |  1.80<H>    Ca    9.2      29 Nov 2018 08:49    TPro  6.7  /  Alb  2.2<L>  /  TBili  0.4  /  DBili  x   /  AST  44<H>  /  ALT  36  /  AlkPhos  118  11-29        Urine Culture:     RADIOLOGY & ADDITIONAL STUDIES:    EXAM:  US KIDNEYS AND BLADDER                            PROCEDURE DATE:  11/28/2018          INTERPRETATION:  CLINICAL INFORMATION: ANNY. Evaluate for hydronephrosis.    COMPARISON: No prior ultrasound examinations of the kidneys are available   for comparison.    TECHNIQUE: Sonography of the kidneys and bladder.     FINDINGS:    Right kidney:  8.6 cm. There is mild right-sided hydroureteronephrosis.   There is a 1.3 cm cyst within the upper pole.    Left kidney:  9.2 cm. No renal mass, hydronephrosis or calculi.    Urinary bladder: Within normal limits.    Incidental note is made of gallbladder sludge.    The patient is status post Becerril catheter placement with the Becerril   balloon inside a decompressed urinary bladder.    IMPRESSION: Mild right-sided hydroureteronephrosis. No left-sided   hydronephrosis.    Status post Becerril catheter placement with the Becerril balloon inside a   mostly decompressed urinary bladder.                KHURRAM KEITA M.D., ATTENDING RADIOLOGIST  This document has been electronically signed. Nov 28 2018  2:57PM                *********************************************************************************************************  IMPRESSION: retention ?  mild rt hydro  creat 1.8     PLAN: maintain becerril  repeat sonogram to see if hydro resolves and creat improves

## 2018-11-29 NOTE — PROGRESS NOTE ADULT - SUBJECTIVE AND OBJECTIVE BOX
PROGRESS NOTE  Patient is a 82y old  Female who presents with a chief complaint of pt is a 83yo female with pmhx of dementia and htn [facility] ems s/p fall. ems reports pt tripped over someone else's walker, pt walks without assistance at baseline. pt found to be febrile on arrival 100.9 Admitted for septic workup and evaluation,send blood and urine cx,serial lactate levels,monitor vitals closley,ivfs hydration,monitor urine output and renal profile,iv abx initiated  pt without complaints. Daughter is at bedside and states patient has a steady gait Found to have DVT and started on full dose AC (29 Nov 2018 08:44)  Chart and available morning labs /imaging are reviewed electronically , urgent issues addressed . More information  is being added upon completion of rounds , when more information is collected and management discussed with consultants , medical staff and social service/case management on the floor     OVERNIGHT      HPI:  pt is a 83yo female with pmhx of dementia and htn [facility] ems s/p fall. ems reports pt tripped over someone else's walker, pt walks without assistance at baseline. pt found to be febrile on arrival 100.9 Admitted for septic workup and evaluation,send blood and urine cx,serial lactate levels,monitor vitals closley,ivfs hydration,monitor urine output and renal profile,iv abx initiated  pt without complaints. Daughter is at bedside and states patient has a steady gait Found to have DVT and started on full dose AC (14 Nov 2018 21:23)    PAST MEDICAL & SURGICAL HISTORY:  Anxiety  HTN (hypertension)  Depression  Dementia  No significant past surgical history      MEDICATIONS  (STANDING):  dextrose 5%. 1000 milliLiter(s) (100 mL/Hr) IV Continuous <Continuous>  docusate sodium 100 milliGRAM(s) Oral two times a day  enoxaparin Injectable 50 milliGRAM(s) SubCutaneous every 24 hours  mirtazapine 15 milliGRAM(s) Oral at bedtime  polyethylene glycol 3350 17 Gram(s) Oral daily  sertraline 25 milliGRAM(s) Oral daily  sertraline 50 milliGRAM(s) Oral daily    MEDICATIONS  (PRN):  acetaminophen   Tablet .. 650 milliGRAM(s) Oral every 6 hours PRN Temp greater or equal to 38C (100.4F), Mild Pain (1 - 3)  bisacodyl Suppository 10 milliGRAM(s) Rectal daily PRN Constipation  morphine Concentrate 2.5 milliGRAM(s) SubLingual every 6 hours PRN Moderate Pain (4 - 6) or dyspnea  OLANZapine Injectable 5 milliGRAM(s) IntraMuscular every 6 hours PRN Acute agitation      OBJECTIVE    T(C): 37.2 (11-28-18 @ 21:05), Max: 37.2 (11-28-18 @ 21:05)  HR: 90 (11-28-18 @ 21:05) (90 - 90)  BP: 114/74 (11-28-18 @ 21:05) (114/74 - 114/74)  RR: 16 (11-28-18 @ 21:05) (16 - 16)  SpO2: 98% (11-28-18 @ 21:05) (98% - 98%)  Wt(kg): --  I&O's Summary    28 Nov 2018 07:01  -  29 Nov 2018 07:00  --------------------------------------------------------  IN: 2950 mL / OUT: 1200 mL / NET: 1750 mL          REVIEW OF SYSTEMS:  CONSTITUTIONAL: No fever, weight loss, or fatigue  EYES: No eye pain, visual disturbances, or discharge  ENMT:   No sinus or throat pain  NECK: No pain or stiffness  RESPIRATORY: No cough, wheezing, chills or hemoptysis; No shortness of breath  CARDIOVASCULAR: No chest pain, palpitations, dizziness, or leg swelling  GASTROINTESTINAL: No abdominal pain. No nausea, vomiting; No diarrhea or constipation. No melena or hematochezia.  GENITOURINARY: No dysuria, frequency, hematuria, or incontinence  NEUROLOGICAL: No headaches, memory loss, loss of strength, numbness, or tremors  SKIN: No itching, burning, rashes, or lesions   MUSCULOSKELETAL: No joint pain or swelling; No muscle, back, or extremity pain    PHYSICAL EXAM:  Appearance: NAD. VS past 24 hrs -as above   HEENT:   Moist oral mucosa. Conjunctiva clear b/l.   Neck : supple  Respiratory: Lungs CTAB.  Gastrointestinal:  Soft, nontender. No rebound. No rigidity. BS present	  Cardiovascular: RRR ,S1S2 present  Neurologic: Non-focal. Moving all extremities.  Extremities: No edema. No erythema. No calf tenderness.  Skin: No rashes, No ecchymoses, No cyanosis.	  wounds ,skin lesions-See skin assesment flow sheet   LABS:                        10.3   10.60 )-----------( 301      ( 29 Nov 2018 08:49 )             33.0     11-28    153<H>  |  116<H>  |  81<H>  ----------------------------<  174<H>  3.9   |  28  |  2.90<H>    Ca    9.0      28 Nov 2018 10:06      CAPILLARY BLOOD GLUCOSE              RADIOLOGY & ADDITIONAL TESTS:   reviewed elctronically  ASSESSMENT/PLAN: PROGRESS NOTE  Patient is a 82y old  Female who presents with a chief complaint of pt is a 83yo female with pmhx of dementia and htn [facility] ems s/p fall. ems reports pt tripped over someone else's walker, pt walks without assistance at baseline. pt found to be febrile on arrival 100.9 Admitted for septic workup and evaluation,send blood and urine cx,serial lactate levels,monitor vitals closley,ivfs hydration,monitor urine output and renal profile,iv abx initiated  pt without complaints. Daughter is at bedside and states patient has a steady gait Found to have DVT and started on full dose AC (29 Nov 2018 08:44)  Chart and available morning labs /imaging are reviewed electronically , urgent issues addressed . More information  is being added upon completion of rounds , when more information is collected and management discussed with consultants , medical staff and social service/case management on the floor     OVERNIGHT  No new issues reported by medical staff . All above noted Patient is resting in a bed comfortably .Confused ,poor mentation .No distress noted v  BUN/CR is better on iv hydration Guzman was placed due to UR and seen by  due to mild hydronephrosis   HPI:  pt is a 83yo female with pmhx of dementia and htn [facility] ems s/p fall. ems reports pt tripped over someone else's walker, pt walks without assistance at baseline. pt found to be febrile on arrival 100.9 Admitted for septic workup and evaluation,send blood and urine cx,serial lactate levels,monitor vitals closley,ivfs hydration,monitor urine output and renal profile,iv abx initiated  pt without complaints. Daughter is at bedside and states patient has a steady gait Found to have DVT and started on full dose AC (14 Nov 2018 21:23)    PAST MEDICAL & SURGICAL HISTORY:  Anxiety  HTN (hypertension)  Depression  Dementia  No significant past surgical history      MEDICATIONS  (STANDING):  dextrose 5%. 1000 milliLiter(s) (100 mL/Hr) IV Continuous <Continuous>  docusate sodium 100 milliGRAM(s) Oral two times a day  enoxaparin Injectable 50 milliGRAM(s) SubCutaneous every 24 hours  mirtazapine 15 milliGRAM(s) Oral at bedtime  polyethylene glycol 3350 17 Gram(s) Oral daily  sertraline 25 milliGRAM(s) Oral daily  sertraline 50 milliGRAM(s) Oral daily    MEDICATIONS  (PRN):  acetaminophen   Tablet .. 650 milliGRAM(s) Oral every 6 hours PRN Temp greater or equal to 38C (100.4F), Mild Pain (1 - 3)  bisacodyl Suppository 10 milliGRAM(s) Rectal daily PRN Constipation  morphine Concentrate 2.5 milliGRAM(s) SubLingual every 6 hours PRN Moderate Pain (4 - 6) or dyspnea  OLANZapine Injectable 5 milliGRAM(s) IntraMuscular every 6 hours PRN Acute agitation      OBJECTIVE    T(C): 37.2 (11-28-18 @ 21:05), Max: 37.2 (11-28-18 @ 21:05)  HR: 90 (11-28-18 @ 21:05) (90 - 90)  BP: 114/74 (11-28-18 @ 21:05) (114/74 - 114/74)  RR: 16 (11-28-18 @ 21:05) (16 - 16)  SpO2: 98% (11-28-18 @ 21:05) (98% - 98%)  Wt(kg): --  I&O's Summary    28 Nov 2018 07:01  -  29 Nov 2018 07:00  --------------------------------------------------------  IN: 2950 mL / OUT: 1200 mL / NET: 1750 mL    REVIEW OF SYSTEMS:  ROS is unobtainable due to lethargy and confusion   PHYSICAL EXAM:  Appearance: NAD. VS past 24 hrs -as above   HEENT:   Moist oral mucosa. Conjunctiva clear b/l.   Neck : supple  Respiratory: Lungs CTAB.  Gastrointestinal:  Soft, nontender. No rebound. No rigidity. BS present	  Cardiovascular: RRR ,S1S2 present  Neurologic: Non-focal. Moving all extremities.  Extremities: No edema. No erythema. No calf tenderness.  Skin: No rashes, No ecchymoses, No cyanosis.	  wounds ,skin lesions-See skin assesment flow sheet   LABS:                        10.3   10.60 )-----------( 301      ( 29 Nov 2018 08:49 )             33.0     11-28    153<H>  |  116<H>  |  81<H>  ----------------------------<  174<H>  3.9   |  28  |  2.90<H>    Ca    9.0      28 Nov 2018 10:06  CAPILLARY BLOOD GLUCOSE  RADIOLOGY & ADDITIONAL TESTS:< from: US Kidney and Bladder (11.28.18 @ 14:50) >  EXAM:  US KIDNEYS AND BLADDER                            PROCEDURE DATE:  11/28/2018          INTERPRETATION:  CLINICAL INFORMATION: ANNY. Evaluate for hydronephrosis.    COMPARISON: No prior ultrasound examinations of the kidneys are available   for comparison.    TECHNIQUE: Sonography of the kidneys and bladder.     FINDINGS:    Right kidney:  8.6 cm. There is mild right-sided hydroureteronephrosis.   There is a 1.3 cm cyst within the upper pole.    Left kidney:  9.2 cm. No renal mass, hydronephrosis or calculi.    Urinary bladder: Within normal limits.    Incidental note is made of gallbladder sludge.    The patient is status post Guzman catheter placement with the Guzman   balloon inside a decompressed urinary bladder.    IMPRESSION: Mild right-sided hydroureteronephrosis. No left-sided   hydronephrosis.    Status post Guzman catheter placement with the Guzman balloon inside a   mostly decompressed urinary bladder.      < end of copied text >     reviewed elctronically  ASSESSMENT/PLAN:

## 2018-11-30 VITALS
RESPIRATION RATE: 17 BRPM | SYSTOLIC BLOOD PRESSURE: 108 MMHG | TEMPERATURE: 98 F | OXYGEN SATURATION: 97 % | HEART RATE: 78 BPM | DIASTOLIC BLOOD PRESSURE: 78 MMHG

## 2018-11-30 LAB
ANION GAP SERPL CALC-SCNC: 6 MMOL/L — SIGNIFICANT CHANGE UP (ref 5–17)
BUN SERPL-MCNC: 38 MG/DL — HIGH (ref 7–23)
CALCIUM SERPL-MCNC: 8.8 MG/DL — SIGNIFICANT CHANGE UP (ref 8.5–10.1)
CHLORIDE SERPL-SCNC: 105 MMOL/L — SIGNIFICANT CHANGE UP (ref 96–108)
CO2 SERPL-SCNC: 32 MMOL/L — HIGH (ref 22–31)
CREAT SERPL-MCNC: 1.3 MG/DL — SIGNIFICANT CHANGE UP (ref 0.5–1.3)
GLUCOSE SERPL-MCNC: 101 MG/DL — HIGH (ref 70–99)
HCT VFR BLD CALC: 31.7 % — LOW (ref 34.5–45)
HGB BLD-MCNC: 10.3 G/DL — LOW (ref 11.5–15.5)
MCHC RBC-ENTMCNC: 32.5 GM/DL — SIGNIFICANT CHANGE UP (ref 32–36)
MCHC RBC-ENTMCNC: 32.9 PG — SIGNIFICANT CHANGE UP (ref 27–34)
MCV RBC AUTO: 101.3 FL — HIGH (ref 80–100)
NRBC # BLD: 0 /100 WBCS — SIGNIFICANT CHANGE UP (ref 0–0)
PLATELET # BLD AUTO: 308 K/UL — SIGNIFICANT CHANGE UP (ref 150–400)
POTASSIUM SERPL-MCNC: 3.7 MMOL/L — SIGNIFICANT CHANGE UP (ref 3.5–5.3)
POTASSIUM SERPL-SCNC: 3.7 MMOL/L — SIGNIFICANT CHANGE UP (ref 3.5–5.3)
RBC # BLD: 3.13 M/UL — LOW (ref 3.8–5.2)
RBC # FLD: 13.2 % — SIGNIFICANT CHANGE UP (ref 10.3–14.5)
SODIUM SERPL-SCNC: 143 MMOL/L — SIGNIFICANT CHANGE UP (ref 135–145)
WBC # BLD: 8.83 K/UL — SIGNIFICANT CHANGE UP (ref 3.8–10.5)
WBC # FLD AUTO: 8.83 K/UL — SIGNIFICANT CHANGE UP (ref 3.8–10.5)

## 2018-11-30 PROCEDURE — 94760 N-INVAS EAR/PLS OXIMETRY 1: CPT

## 2018-11-30 PROCEDURE — 84145 PROCALCITONIN (PCT): CPT

## 2018-11-30 PROCEDURE — 87086 URINE CULTURE/COLONY COUNT: CPT

## 2018-11-30 PROCEDURE — 85730 THROMBOPLASTIN TIME PARTIAL: CPT

## 2018-11-30 PROCEDURE — 83605 ASSAY OF LACTIC ACID: CPT

## 2018-11-30 PROCEDURE — 97116 GAIT TRAINING THERAPY: CPT

## 2018-11-30 PROCEDURE — 74018 RADEX ABDOMEN 1 VIEW: CPT

## 2018-11-30 PROCEDURE — 99285 EMERGENCY DEPT VISIT HI MDM: CPT | Mod: 25

## 2018-11-30 PROCEDURE — 96372 THER/PROPH/DIAG INJ SC/IM: CPT | Mod: XU

## 2018-11-30 PROCEDURE — 84550 ASSAY OF BLOOD/URIC ACID: CPT

## 2018-11-30 PROCEDURE — 97162 PT EVAL MOD COMPLEX 30 MIN: CPT

## 2018-11-30 PROCEDURE — 82550 ASSAY OF CK (CPK): CPT

## 2018-11-30 PROCEDURE — 72170 X-RAY EXAM OF PELVIS: CPT

## 2018-11-30 PROCEDURE — 70450 CT HEAD/BRAIN W/O DYE: CPT

## 2018-11-30 PROCEDURE — 76770 US EXAM ABDO BACK WALL COMP: CPT

## 2018-11-30 PROCEDURE — 80053 COMPREHEN METABOLIC PANEL: CPT

## 2018-11-30 PROCEDURE — 71045 X-RAY EXAM CHEST 1 VIEW: CPT

## 2018-11-30 PROCEDURE — 84484 ASSAY OF TROPONIN QUANT: CPT

## 2018-11-30 PROCEDURE — 97110 THERAPEUTIC EXERCISES: CPT

## 2018-11-30 PROCEDURE — 97530 THERAPEUTIC ACTIVITIES: CPT

## 2018-11-30 PROCEDURE — 84100 ASSAY OF PHOSPHORUS: CPT

## 2018-11-30 PROCEDURE — 87040 BLOOD CULTURE FOR BACTERIA: CPT

## 2018-11-30 PROCEDURE — 97112 NEUROMUSCULAR REEDUCATION: CPT

## 2018-11-30 PROCEDURE — 83690 ASSAY OF LIPASE: CPT

## 2018-11-30 PROCEDURE — 80048 BASIC METABOLIC PNL TOTAL CA: CPT

## 2018-11-30 PROCEDURE — 93971 EXTREMITY STUDY: CPT

## 2018-11-30 PROCEDURE — 83735 ASSAY OF MAGNESIUM: CPT

## 2018-11-30 PROCEDURE — 36415 COLL VENOUS BLD VENIPUNCTURE: CPT

## 2018-11-30 PROCEDURE — 96365 THER/PROPH/DIAG IV INF INIT: CPT | Mod: XU

## 2018-11-30 PROCEDURE — 81001 URINALYSIS AUTO W/SCOPE: CPT

## 2018-11-30 PROCEDURE — 93005 ELECTROCARDIOGRAM TRACING: CPT

## 2018-11-30 PROCEDURE — 71275 CT ANGIOGRAPHY CHEST: CPT

## 2018-11-30 PROCEDURE — 72125 CT NECK SPINE W/O DYE: CPT

## 2018-11-30 PROCEDURE — 80061 LIPID PANEL: CPT

## 2018-11-30 PROCEDURE — 85027 COMPLETE CBC AUTOMATED: CPT

## 2018-11-30 PROCEDURE — 85610 PROTHROMBIN TIME: CPT

## 2018-11-30 PROCEDURE — 70486 CT MAXILLOFACIAL W/O DYE: CPT

## 2018-11-30 PROCEDURE — 73502 X-RAY EXAM HIP UNI 2-3 VIEWS: CPT

## 2018-11-30 PROCEDURE — 93306 TTE W/DOPPLER COMPLETE: CPT

## 2018-11-30 RX ADMIN — POLYETHYLENE GLYCOL 3350 17 GRAM(S): 17 POWDER, FOR SOLUTION ORAL at 13:41

## 2018-11-30 RX ADMIN — SODIUM CHLORIDE 100 MILLILITER(S): 9 INJECTION, SOLUTION INTRAVENOUS at 00:28

## 2018-11-30 RX ADMIN — SERTRALINE 50 MILLIGRAM(S): 25 TABLET, FILM COATED ORAL at 13:40

## 2018-11-30 RX ADMIN — Medication 100 MILLIGRAM(S): at 06:11

## 2018-11-30 RX ADMIN — SERTRALINE 25 MILLIGRAM(S): 25 TABLET, FILM COATED ORAL at 13:40

## 2018-11-30 RX ADMIN — ENOXAPARIN SODIUM 50 MILLIGRAM(S): 100 INJECTION SUBCUTANEOUS at 15:09

## 2018-11-30 NOTE — PROGRESS NOTE ADULT - PROBLEM SELECTOR PLAN 3
Admitted for septic workup and evaluation,send blood and urine cx,serial lactate levels,monitor vitals closley,ivfs hydration,monitor urine output and renal profile,iv abx initiated
Admitted for septic workup and evaluation,send blood and urine cx,serial lactate levels,monitor vitals closley,ivfs hydration,monitor urine output and renal profile,iv abx initiated
ON LOVENOX DAILY THERAPEUTIC DOSE DUE TO WORSENING RENAL PROFILE ,dose d/w the pharmacy
continue home medications
controlled
no evidence of pneumonia
no evidence of pneumonia
HTN (hypertension).  Recommendation: BP monitoring,continue current antihypertensive meds, low salt diet,followup with PMD in 1-2 weeks.
HTN (hypertension).  Recommendation: BP monitoring,continue current antihypertensive meds, low salt diet,followup with PMD in 1-2 weeks.
continue home medications
no evidence of pneumonia
ON LOVENOX DAILY THERAPEUTIC DOSE DUE TO WORSENING RENAL PROFILE ,dose d/w the pharmacy
no evidence of pneumonia
no evidence of pneumonia

## 2018-11-30 NOTE — PROGRESS NOTE ADULT - PROBLEM SELECTOR PLAN 4
continue home medications
chronic kidney disease stage I and increase of BUN/creatinine secondary  fluid intake will increase water intake low-salt diet follow-up with the BMP in a.m. if kidney function is not improving will start IV fluid
continue home medications
no evidence of pneumonia
warm compresses to left cheek hematoma ,tylenol prn ,fall precautions Increased confusion and delirious state reported this morning and constant observation renewed .Patient was trying to ambulate with assistance but was found to be  unsteady today ,she was trying to get out of the bed .She was independent walker before admission as per daughters and no falls in a past reported ( except this admission associated with mechanical trip) PT reeval requested ,may require rehab ,also may need xarelto to be substituted with lovenox ( if goes to [facility] ) or IVC filter may be an option if gait remains unsteady .case was d/w Dr Khalil ,spoke to daughter Tameka on a phone
warm compresses to left cheek hematoma ,tylenol prn ,fall precautions Increased confusion and delirious state reported this morning and constant observation renewed .Patient was trying to ambulate with assistance but was found to be  unsteady today ,she was trying to get out of the bed .She was independent walker before admission as per daughters and no falls in a past reported ( except this admission associated with mechanical trip) PT reeval requested ,may require rehab ,also may need xarelto to be substituted with lovenox ( if goes to [facility] ) or IVC filter may be an option if gait remains unsteady .case was d/w Dr Khalil ,spoke to daughter Tameka on a phone
Aspiration pneumonia of left lower lobe due to regurgitated food.  Recommendation: f/u blood and urine cx,serial lactate levels,monitor vitals closley,ivfs hydration,monitor urine output and renal profile,iv abx as per id cons.
Aspiration pneumonia of left lower lobe due to regurgitated food.  Recommendation: f/u blood and urine cx,serial lactate levels,monitor vitals closley,ivfs hydration,monitor urine output and renal profile,iv abx as per id cons.
ON CMO
continue home medications
improved   chronic kidney disease stage I and increase of BUN/creatinine secondary  fluid intake will increase water intake low-salt diet follow-up with the BMP in a.m. if kidney function is not improving will start IV fluid
improved , will f/u with I and o   chronic kidney disease stage I and increase of BUN/creatinine secondary  fluid intake will increase water intake low-salt diet follow-up with the BMP in a.m. if kidney function is not improving will start IV fluid
improved , will f/u with I and o   chronic kidney disease stage I and increase of BUN/creatinine secondary  fluid intake will increase water intake low-salt diet follow-up with the BMP in a.m. if kidney function is not improving will start IV fluid
warm compresses to left cheek hematoma ,tylenol prn ,fall precautions Increased confusion and delirious state reported this morning and constant observation renewed .Patient was trying to ambulate with assistance but was found to be  unsteady today ,she was trying to get out of the bed .She was independent walker before admission as per daughters and no falls in a past reported ( except this admission associated with mechanical trip) PT reeval requested ,may require rehab ,also may need xarelto to be substituted with lovenox ( if goes to [facility] ) or IVC filter may be an option if gait remains unsteady .case was d/w Dr Khalil ,spoke to daughter Tameka on a phone
continue home medications
continue home medications
no evidence of pneumonia
continue home medications

## 2018-11-30 NOTE — PROGRESS NOTE ADULT - PROBLEM SELECTOR PROBLEM 4
HTN (hypertension)
ANNY (acute kidney injury)
Fall
Fall
HTN (hypertension)
PNA (pneumonia)
ANNY (acute kidney injury)
Aspiration pneumonia of left lower lobe due to regurgitated food
Aspiration pneumonia of left lower lobe due to regurgitated food
Fall
Fall
HTN (hypertension)
PNA (pneumonia)
HTN (hypertension)

## 2018-11-30 NOTE — PROGRESS NOTE ADULT - SUBJECTIVE AND OBJECTIVE BOX
Neurology follow up note    LYUBOV [id number]      Interval History:    Patient resting in bed    MEDICATIONS    acetaminophen   Tablet .. 650 milliGRAM(s) Oral every 6 hours PRN  bisacodyl Suppository 10 milliGRAM(s) Rectal daily PRN  dextrose 5%. 1000 milliLiter(s) IV Continuous <Continuous>  docusate sodium 100 milliGRAM(s) Oral two times a day  enoxaparin Injectable 50 milliGRAM(s) SubCutaneous every 24 hours  mirtazapine 15 milliGRAM(s) Oral at bedtime  morphine Concentrate 2.5 milliGRAM(s) SubLingual every 6 hours PRN  OLANZapine Injectable 5 milliGRAM(s) IntraMuscular every 6 hours PRN  polyethylene glycol 3350 17 Gram(s) Oral daily  sertraline 25 milliGRAM(s) Oral daily  sertraline 50 milliGRAM(s) Oral daily      Allergies    No Known Allergies    Intolerances            Vital Signs Last 24 Hrs  T(C): 36.8 (29 Nov 2018 22:01), Max: 36.8 (29 Nov 2018 22:01)  T(F): 98.2 (29 Nov 2018 22:01), Max: 98.2 (29 Nov 2018 22:01)  HR: 83 (29 Nov 2018 22:01) (83 - 85)  BP: 113/55 (29 Nov 2018 22:01) (100/60 - 113/55)  BP(mean): --  RR: 16 (29 Nov 2018 22:01) (16 - 16)  SpO2: 97% (29 Nov 2018 22:01) (95% - 97%)    REVIEW OF SYSTEMS:  Extremely limited secondary to the patient's baseline that she says one to two words and occasionally will articulate.    PHYSICAL EXAMINATION:     HEENT:  Head:  Positive trauma was noted over the left eye.    Eyes:  No scleral icterus.    Ears:  Hearing hard to evaluate.    NECK:  Supple.    RESPIRATORY:  Decreased breath sounds bilaterally.    CARDIOVASCULAR:  S1 and S2 are heard.    ABDOMEN:  Soft and nontender.    EXTREMITIES:  No clubbing or cyanosis were noted.      NEUROLOGIC:  The patient is awake and alert.    Extraocular movements were intact.  Positive blink to bilateral visual threat.    The patient would mumble, occasionally would say one word, was off and on following commands.  Motor:  With stimuli, did move all four extremities.  No focality was noted.    Tone:  Bilateral upper and lower was increased.                 LABS:  CBC Full  -  ( 30 Nov 2018 08:18 )  WBC Count : 8.83 K/uL  Hemoglobin : 10.3 g/dL  Hematocrit : 31.7 %  Platelet Count - Automated : 308 K/uL  Mean Cell Volume : 101.3 fl  Mean Cell Hemoglobin : 32.9 pg  Mean Cell Hemoglobin Concentration : 32.5 gm/dL  Auto Neutrophil # : x  Auto Lymphocyte # : x  Auto Monocyte # : x  Auto Eosinophil # : x  Auto Basophil # : x  Auto Neutrophil % : x  Auto Lymphocyte % : x  Auto Monocyte % : x  Auto Eosinophil % : x  Auto Basophil % : x      11-30    143  |  105  |  38<H>  ----------------------------<  101<H>  3.7   |  32<H>  |  1.30    Ca    8.8      30 Nov 2018 08:18    TPro  6.7  /  Alb  2.2<L>  /  TBili  0.4  /  DBili  x   /  AST  44<H>  /  ALT  36  /  AlkPhos  118  11-29    Hemoglobin A1C:     LIVER FUNCTIONS - ( 29 Nov 2018 08:49 )  Alb: 2.2 g/dL / Pro: 6.7 g/dL / ALK PHOS: 118 U/L / ALT: 36 U/L / AST: 44 U/L / GGT: x           Vitamin B12         RADIOLOGY      ANALYSIS AND PLAN:  An 82-year-old with an episode of fall, head trauma, and change in mental status.  1.	For episodes of fall, this appears to be a mechanical fall.  There is no clear history of epilepsy reported.  The examination was limited, but no signs to suggest a new cerebrovascular accident has ensued.  2.	For episode of head trauma, would recommend to continue neuro checks.  3.	For change in mental status, most likely secondary to dementia made worse in the hospital setting.  4.	For history of dementia, secondary to advanced symptoms, do not feel medications would be of any benefit.  The patient did try medications in the past.  5.	Physical Therapy evaluation.  6.	Fall precautions.  7.	monitor intake  8.	h/o lethargy continue to hold Risperdal for now    9.	no new events noted   10.	awaiting placement   11.	monitor urine out put as needed  12.	Spoke with the daughter, Marychuy, at [phone] 11/29/18 called other daughter  Tameka   13.	Greater than 25  minutes of time was spent with the patient, plan of care, reviewing data, and speaking to the family and multidisciplinary healthcare team.    Thank you for the courtesy of this consultation.

## 2018-11-30 NOTE — PROGRESS NOTE ADULT - SUBJECTIVE AND OBJECTIVE BOX
Patient is a 82y Female with a known history of :  Aspiration pneumonia of left lower lobe due to regurgitated food (J69.0)  Hypernatremia (E87.0)  Constipation (K59.00)  Delirium (R41.0)  ANNY (acute kidney injury) (N17.9)  Prophylactic measure (Z29.9)  Anxiety (F41.9)  Dementia (F03.90)  Depression (F32.9)  HTN (hypertension) (I10)  PNA (pneumonia) (J18.9)  DVT (deep venous thrombosis) (I82.409)  Fall (W19.XXXA)  Suspected deep vein thrombosis ([id number])    HPI:  pt is a 83yo female with pmhx of dementia and htn [facility] ems s/p fall. ems reports pt tripped over someone else's walker, pt walks without assistance at baseline. pt found to be febrile on arrival 100.9 Admitted for septic workup and evaluation,send blood and urine cx,serial lactate levels,monitor vitals closley,ivfs hydration,monitor urine output and renal profile,iv abx initiated  pt without complaints. Daughter is at bedside and states patient has a steady gait Found to have DVT and started on full dose AC (14 Nov 2018 21:23)      REVIEW OF SYSTEMS:    CONSTITUTIONAL: No fever, weight loss, or fatigue  EYES: No eye pain, visual disturbances, or discharge  ENMT:  No difficulty hearing, tinnitus, vertigo; No sinus or throat pain  NECK: No pain or stiffness  BREASTS: No pain, masses, or nipple discharge  RESPIRATORY: No cough, wheezing, chills or hemoptysis; No shortness of breath  CARDIOVASCULAR: No chest pain, palpitations, dizziness, or leg swelling  GASTROINTESTINAL: No abdominal or epigastric pain. No nausea, vomiting, or hematemesis; No diarrhea or constipation. No melena or hematochezia.  GENITOURINARY: No dysuria, frequency, hematuria, or incontinence  NEUROLOGICAL: No headaches, memory loss, loss of strength, numbness, or tremors  SKIN: No itching, burning, rashes, or lesions   LYMPH NODES: No enlarged glands  ENDOCRINE: No heat or cold intolerance; No hair loss  MUSCULOSKELETAL: No joint pain or swelling; No muscle, back, or extremity pain  PSYCHIATRIC: No depression, anxiety, mood swings, or difficulty sleeping  HEME/LYMPH: No easy bruising, or bleeding gums  ALLERGY AND IMMUNOLOGIC: No hives or eczema    MEDICATIONS  (STANDING):  dextrose 5%. 1000 milliLiter(s) (100 mL/Hr) IV Continuous <Continuous>  docusate sodium 100 milliGRAM(s) Oral two times a day  enoxaparin Injectable 50 milliGRAM(s) SubCutaneous every 24 hours  mirtazapine 15 milliGRAM(s) Oral at bedtime  polyethylene glycol 3350 17 Gram(s) Oral daily  sertraline 25 milliGRAM(s) Oral daily  sertraline 50 milliGRAM(s) Oral daily    MEDICATIONS  (PRN):  acetaminophen   Tablet .. 650 milliGRAM(s) Oral every 6 hours PRN Temp greater or equal to 38C (100.4F), Mild Pain (1 - 3)  bisacodyl Suppository 10 milliGRAM(s) Rectal daily PRN Constipation  morphine Concentrate 2.5 milliGRAM(s) SubLingual every 6 hours PRN Moderate Pain (4 - 6) or dyspnea  OLANZapine Injectable 5 milliGRAM(s) IntraMuscular every 6 hours PRN Acute agitation      ALLERGIES: No Known Allergies      FAMILY HISTORY:  No pertinent family history in first degree relatives      PHYSICAL EXAMINATION:  -----------------------------  T(C): 36.8 (11-29-18 @ 22:01), Max: 36.8 (11-29-18 @ 22:01)  HR: 83 (11-29-18 @ 22:01) (83 - 85)  BP: 113/55 (11-29-18 @ 22:01) (100/60 - 113/55)  RR: 16 (11-29-18 @ 22:01) (16 - 16)  SpO2: 97% (11-29-18 @ 22:01) (95% - 97%)  Wt(kg): --    11-29 @ 07:01  -  11-30 @ 07:00  --------------------------------------------------------  IN:    dextrose 5%.: 1200 mL  Total IN: 1200 mL    OUT:    Indwelling Catheter - Urethral: 1650 mL  Total OUT: 1650 mL    Total NET: -450 mL            Constitutional: well developed, normal appearance, well groomed, well nourished, no deformities and no acute distress.   Eyes: the conjunctiva exhibited no abnormalities and the eyelids demonstrated no xanthelasmas.   HEENT: normal oral mucosa, no oral pallor and no oral cyanosis.   Neck: normal jugular venous A waves present, normal jugular venous V waves present and no jugular venous lao A waves.   Pulmonary: no respiratory distress, normal respiratory rhythm and effort, no accessory muscle use and lungs were clear to auscultation bilaterally.   Cardiovascular: heart rate and rhythm were normal, normal S1 and S2 and no murmur, gallop, rub, heave or thrill are present.   Abdomen: soft, non-tender, no hepato-splenomegaly and no abdominal mass palpated.   Musculoskeletal: the gait could not be assessed..   Extremities: no clubbing of the fingernails, no localized cyanosis, no petechial hemorrhages and no ischemic changes.   Skin: normal skin color and pigmentation, no rash, no venous stasis, no skin lesions, no skin ulcer and no xanthoma was observed.   Psychiatric: oriented to person, place, and time, the affect was normal, the mood was normal and not feeling anxious.     LABS:   --------  11-29    150<H>  |  111<H>  |  65<H>  ----------------------------<  110<H>  3.8   |  32<H>  |  1.80<H>    Ca    9.2      29 Nov 2018 08:49    TPro  6.7  /  Alb  2.2<L>  /  TBili  0.4  /  DBili  x   /  AST  44<H>  /  ALT  36  /  AlkPhos  118  11-29                         10.3   8.83  )-----------( 308      ( 30 Nov 2018 08:18 )             31.7                 RADIOLOGY:  -----------------        ECG:

## 2018-11-30 NOTE — PROGRESS NOTE ADULT - PROBLEM SELECTOR PROBLEM 3
DVT (deep venous thrombosis)
HTN (hypertension)
PNA (pneumonia)
HTN (hypertension)
PNA (pneumonia)
DVT (deep venous thrombosis)
PNA (pneumonia)

## 2018-11-30 NOTE — PROGRESS NOTE ADULT - PROBLEM SELECTOR PROBLEM 1
ANNY (acute kidney injury)
Constipation
Constipation
DVT (deep venous thrombosis)
Fall
ANNY (acute kidney injury)
ANNY (acute kidney injury)
Constipation
Constipation
DVT (deep venous thrombosis)
Fall
ANNY (acute kidney injury)
Fall

## 2018-11-30 NOTE — PROGRESS NOTE ADULT - SUBJECTIVE AND OBJECTIVE BOX
PROGRESS NOTE  Patient is a 82y old  Female who presents with a chief complaint of pt is a 81yo female with pmhx of dementia and htn [facility] ems s/p fall. ems reports pt tripped over someone else's walker, pt walks without assistance at baseline. pt found to be febrile on arrival 100.9 Admitted for septic workup and evaluation,send blood and urine cx,serial lactate levels,monitor vitals closley,ivfs hydration,monitor urine output and renal profile,iv abx initiated  pt without complaints. Daughter is at bedside and states patient has a steady gait Found to have DVT and started on full dose AC (30 Nov 2018 10:43)    Chart and available morning labs /imaging are reviewed electronically , urgent issues addressed . More information  is being added upon completion of rounds , when more information is collected and management discussed with consultants , medical staff and social service/case management on the floor   OVERNIGHT  No new issues reported by medical staff . All above noted Patient is resting in a bed comfortably .Confused ,poor mentation .No distress noted   BUN/CR is much better ,cleared by  for d/c TOV IN 1 WEEK ,RENAL US IN 1 WEEK  HPI:  pt is a 81yo female with pmhx of dementia and htn [facility] ems s/p fall. ems reports pt tripped over someone else's walker, pt walks without assistance at baseline. pt found to be febrile on arrival 100.9 Admitted for septic workup and evaluation,send blood and urine cx,serial lactate levels,monitor vitals closley,ivfs hydration,monitor urine output and renal profile,iv abx initiated  pt without complaints. Daughter is at bedside and states patient has a steady gait Found to have DVT and started on full dose AC (14 Nov 2018 21:23)    PAST MEDICAL & SURGICAL HISTORY:  Anxiety  HTN (hypertension)  Depression  Dementia  No significant past surgical history      MEDICATIONS  (STANDING):  dextrose 5%. 1000 milliLiter(s) (100 mL/Hr) IV Continuous <Continuous>  docusate sodium 100 milliGRAM(s) Oral two times a day  enoxaparin Injectable 50 milliGRAM(s) SubCutaneous every 24 hours  mirtazapine 15 milliGRAM(s) Oral at bedtime  polyethylene glycol 3350 17 Gram(s) Oral daily  sertraline 25 milliGRAM(s) Oral daily  sertraline 50 milliGRAM(s) Oral daily    MEDICATIONS  (PRN):  acetaminophen   Tablet .. 650 milliGRAM(s) Oral every 6 hours PRN Temp greater or equal to 38C (100.4F), Mild Pain (1 - 3)  bisacodyl Suppository 10 milliGRAM(s) Rectal daily PRN Constipation  morphine Concentrate 2.5 milliGRAM(s) SubLingual every 6 hours PRN Moderate Pain (4 - 6) or dyspnea  OLANZapine Injectable 5 milliGRAM(s) IntraMuscular every 6 hours PRN Acute agitation      OBJECTIVE    T(C): 36.8 (11-29-18 @ 22:01), Max: 36.8 (11-29-18 @ 22:01)  HR: 83 (11-29-18 @ 22:01) (83 - 85)  BP: 113/55 (11-29-18 @ 22:01) (100/60 - 113/55)  RR: 16 (11-29-18 @ 22:01) (16 - 16)  SpO2: 97% (11-29-18 @ 22:01) (95% - 97%)  Wt(kg): --  I&O's Summary    29 Nov 2018 07:01  -  30 Nov 2018 07:00  --------------------------------------------------------  IN: 1200 mL / OUT: 1650 mL / NET: -450 mL  REVIEW OF SYSTEMS:  ROS is unobtainable due to lethargy and confusion   PHYSICAL EXAM:  Appearance: NAD. VS past 24 hrs -as above   HEENT:   Moist oral mucosa. Conjunctiva clear b/l.   Neck : supple  Respiratory: Lungs CTAB.  Gastrointestinal:  Soft, nontender. No rebound. No rigidity. BS present	  Cardiovascular: RRR ,S1S2 present  Neurologic: Non-focal. Moving all extremities.  Extremities: No edema. No erythema. No calf tenderness.  Skin: No rashes, No ecchymoses, No cyanosis.	  wounds ,skin lesions-See skin assesment flow sheet   LABS:                        10.3   8.83  )-----------( 308      ( 30 Nov 2018 08:18 )             31.7   11-30  143  |  105  |  38<H>  ----------------------------<  101<H>  3.7   |  32<H>  |  1.30  Ca    8.8      30 Nov 2018 08:18  TPro  6.7  /  Alb  2.2<L>  /  TBili  0.4  /  DBili  x   /  AST  44<H>  /  ALT  36  /  AlkPhos  118  11-29  CAPILLARY BLOOD GLUCOSE  RADIOLOGY & ADDITIONAL TESTS:< from: US Kidney and Bladder (11.28.18 @ 14:50) >  EXAM:  US KIDNEYS AND BLADDER                        PROCEDURE DATE:  11/28/2018    INTERPRETATION:  CLINICAL INFORMATION: ANNY. Evaluate for hydronephrosis.  COMPARISON: No prior ultrasound examinations of the kidneys are available   for comparison.  TECHNIQUE: Sonography of the kidneys and bladder.   FINDINGS:  Right kidney:  8.6 cm. There is mild right-sided hydroureteronephrosis.   There is a 1.3 cm cyst within the upper pole.  Left kidney:  9.2 cm. No renal mass, hydronephrosis or calculi.  Urinary bladder: Within normal limits.  Incidental note is made of gallbladder sludge.  The patient is status post Guzman catheter placement with the Guzman   balloon inside a decompressed urinary bladder.  IMPRESSION: Mild right-sided hydroureteronephrosis. No left-sided   hydronephrosis.  Status post Guzman catheter placement with the Guzman balloon inside a   mostly decompressed urinary bladder.  < end of copied text >   reviewed elctronically  ASSESSMENT/PLAN: 	  Patient was seen and examined on a day of discharge . Plan of care , discharge medications and recommendations discussed with consultants and clearance for discharge obtained .Social service , case managment  and medical staff are aware of plan. Family is notified. Discharge summary  is  prepared electronically

## 2018-11-30 NOTE — PROGRESS NOTE ADULT - SUBJECTIVE AND OBJECTIVE BOX
11/30/2018 am Patient was examined Chart was reviewed Detailed pulmonary note will be inserted below after more data has been gathered and processed Meanwhile please refer to my previous note for recommendations 11/30/2018 am Patient was examined Chart was reviewed Detailed pulmonary note will be inserted below after more data has been gathered and processed Meanwhile please refer to my previous note for recommendations           Adriana Swanson [facility]   ALLERGY nka  CONTACT Dtr Tameka Campos  Self   REASON FOR VISIT Subsequent Pulmonary followup  Initial evaluation/Pulmonary consultation requested 11/14/2018 by Dr Olivares from Dr Khalil   Patient examined chart reviewed  HOSPITAL ADMISSION [facility]  P 11/14/2018   PATIENT CAME  FROM (if information available)      IVF  D5 100 (11/28)     VITALS/LABS         11/30/2018 afeb 78 108/78 17 97%   11/30/2018 W 8.8 Hb 10.3 Plt 308 Na 143 K 3.7 CO2 32 Cr 1.3     11/29/2018 99 90 114/74 16 98%   11/29/2018 W 10.6 Hb 10.3 Plt 301 Na 150 K 3.8 CO2 32 Cr 1.8                    11/28/2018 afeb 84 120/60 16 94%   11/28/2018 W 10.9 Hb 11.3 Plt 319 Na 153 K 3.9 CO2 28 Cr 2.9     REVIEW OF SYMPTOMS    Able to give ROS  NO     PHYSICAL EXAM    HEENT Unremarkable PERRLA atraumatic   RESP Fair air entry EXP prolonged    Harsh breath sound Resp distres mild   CARDIAC S1 S2 No S3     NO JVD    ABDOMEN SOFT BS PRESENT NOT DISTENDED No hepatosplenomegaly PEDAL EDEMA present No calf tenderness  NO rash   GENERAL Not TOXIC looking    GLOBAL ISSUE/BEST PRACTICE:      PROBLEM: HOB elevation:   y            PROBLEM: Stress ulcer proph:    protonix 40 911/14)                       PROBLEM: VTE prophylaxis:      lvnx 50.2 (11/14) ? Rivaroxaban 15.2 (11/17)  PROBLEM: Glycemic control:    na  PROBLEM: Nutrition:    watkins (11/14)   PROBLEM: Advanced directive: na     PROBLEM: Allergies:  na    PATIENT MANAGEMENT   11/14/2018 ADMISSION [facility] P DR LEVY TY   11/14/2018 81yo female with pmhx of dementia and htn [facility] ems s/p fall. ems reports pt tripped over someone else's walker, pt walks without assistance at baseline. pt found to be febrile on arrival 100.9. pt without complaints.   pmd: [place]    Pt was found to have DVT and was started on full dose lovenox 11/14 She was also found to have patchy pl based opacity rml Case was dw IR and Dr Farah felt that ctnb should not be done but a repeat CT ch should be done in 2 m Dr Kaiser was consulted 11/17/2018 to decide re ivcf vs Xarelto and this was dw family in detail on 11/17/2018   Pt was seen by Dr Kaiser and fam declined IVCF which was recommended   Pt was started on Xarelto Changed to Lovenox   11/27/2018 Hospice DC being planned   11/28/2018 On cmo   11/29/2018 On IVF     ASSESSMENT/RECOMMENDATIONS  11/14/2018 ADMISSION [facility] P DR LEVY TY    DVT   11/18/2018 Seen by Dr Kaiser ivcf recommended but daughter Mrs Campos declined   11/17/2018 Started on Xarelto 15.2 (11/17)   11/16/2018 DW Dr Ty and with family Pt is steady on her feet and this was a trip fall Pros and cons of doac explained to pt family Xarelto agreed upon by Dr Ty   11/14/2018 V duplex R leg DVT     ELEVATED CREAT   Monitored serially Improved     PATCHY OPACITY RML ON CT CHEST DONE 11/14  This could be a pulmonary infarct Will dw IR to see if ctnb is feasible Will need serial followup and will need PET after DC   11/16/2018 I dw Dr Farah He feels that instead of ctnb we should rep[eat CT Chest in 2 m Message sent to Dr Ty   11/24/2018 To take Xareltyo 15.2 x 21 d then Xarelto 20  11/30/2018 Pt currently on lvnx 50 (11/26)     PNEUMONIA   Bacterial pneumonia unlikely in absence of procalcitonin leukocytosis and fever  DCed abio (11/15/2018)   11/14 bc n 11/14 uc n     Awaits placement    TIME SPENT Over 25 minutes aggregate care time spent on encounter; activities included   direct patient care, counseling and/or coordinating care reviewing notes, lab data/ imaging , discussion with multidisciplinary team/ patient  /family. Risks, benefits, alternatives  discussed in detail.

## 2018-11-30 NOTE — PROGRESS NOTE ADULT - REASON FOR ADMISSION
pt is a 81yo female with pmhx of dementia [facility] ems reports pt tripped over someone else's walker, pt walks without assistance at baseline. pt found to be febrile on arrival 100.9 Admitted for septic workup and evaluation,send blood and urine cx,serial lactate levels,monitor vitals closley,ivfs hydration,monitor urine output and renal profile,iv abx initiated  pt without complaints. Daughter is at bedside and states patient has a steady gait Found to have DVT and started on full dose AC
pt is a 81yo female [facility] ems reports pt tripped over someone else's walker, pt walks without assistance at baseline. pt found to be febrile on arrival 100.9 Admitted for septic workup and evaluation,send blood and urine cx,serial lactate levels,monitor vitals closley,ivfs hydration,monitor urine output and renal profile,iv abx initiated  pt without complaints. Daughter is at bedside and states patient has a steady gait Found to have DVT and started on full dose AC
pt is a 81yo female [facility]x [facility] ems reports pt tripped over someone else's walker, pt walks without assistance at baseline. pt found to be febrile on arrival 100.9 Admitted for septic workup and evaluation,send blood and urine cx,serial lactate levels,monitor vitals closley,ivfs hydration,monitor urine output and renal profile,iv abx initiated  pt without complaints. Daughter is at bedside and states patient has a steady gait Found to have DVT and started on full dose AC
pt is a 81yo female [facility]x [facility] ems reports pt tripped over someone else's walker, pt walks without assistance at baseline. pt found to be febrile on arrival 100.9 Admitted for septic workup and evaluation,send blood and urine cx,serial lactate levels,monitor vitals closley,ivfs hydration,monitor urine output and renal profile,iv abx initiated  pt without complaints. Daughter is at bedside and states patient has a steady gait Found to have DVT and started on full dose AC
pt is a 81yo female [facility] ems reports pt tripped over someone else's walker, pt walks without assistance at baseline. pt found to be febrile on arrival 100.9 Admitted for septic workup and evaluation,send blood and urine cx,serial lactate levels,monitor vitals closley,ivfs hydration,monitor urine output and renal profile,iv abx initiated  pt without complaints. Daughter is at bedside and states patient has a steady gait Found to have DVT and started on full dose AC
pt is a 81yo female [facility]x [facility] ems reports pt tripped over someone else's walker, pt walks without assistance at baseline. pt found to be febrile on arrival 100.9 Admitted for septic workup and evaluation,send blood and urine cx,serial lactate levels,monitor vitals closley,ivfs hydration,monitor urine output and renal profile,iv abx initiated  pt without complaints. Daughter is at bedside and states patient has a steady gait Found to have DVT and started on full dose AC
pt is a 81yo female [facility]x [facility] ems reports pt tripped over someone else's walker, pt walks without assistance at baseline. pt found to be febrile on arrival 100.9 Admitted for septic workup and evaluation,send blood and urine cx,serial lactate levels,monitor vitals closley,ivfs hydration,monitor urine output and renal profile,iv abx initiated  pt without complaints. Daughter is at bedside and states patient has a steady gait Found to have DVT and started on full dose AC
pt is a 81yo female [facility] ems reports pt tripped over someone else's walker, pt walks without assistance at baseline. pt found to be febrile on arrival 100.9 Admitted for septic workup and evaluation,send blood and urine cx,serial lactate levels,monitor vitals closley,ivfs hydration,monitor urine output and renal profile,iv abx initiated  pt without complaints. Daughter is at bedside and states patient has a steady gait Found to have DVT and started on full dose AC
pt is a 81yo female [facility] ems reports pt tripped over someone else's walker, pt walks without assistance at baseline. pt found to be febrile on arrival 100.9 Admitted for septic workup and evaluation,send blood and urine cx,serial lactate levels,monitor vitals closley,ivfs hydration,monitor urine output and renal profile,iv abx initiated  pt without complaints. Daughter is at bedside and states patient has a steady gait Found to have DVT and started on full dose AC
pt is a 81yo female [facility] ems reports pt tripped over someone else's walker, pt walks without assistance at baseline. pt found to be febrile on arrival 100.9 Admitted for septic workup and evaluation,send blood and urine cx,serial lactate levels,monitor vitals closley,ivfs hydration,monitor urine output and renal profile,iv abx initiated  pt without complaints. Daughter is at bedside and states patient has a steady gait Found to have DVT and started on full dose AC
pt is a 81yo female [facility] ems reports pt tripped over someone else's walker, pt walks without assistance at baseline. pt found to be febrile on arrival 100.9 Admitted for septic workup and evaluation,send blood and urine cx,serial lactate levels,monitor vitals closley,ivfs hydration,monitor urine output and renal profile,iv abx initiated  pt without complaints. Daughter is at bedside and states patient has a steady gait Found to have DVT and started on full dose AC
pt is a 81yo female [facility] ems reports pt tripped over someone else's walker, pt walks without assistance at baseline. pt found to be febrile on arrival 100.9 Admitted for septic workup and evaluation,send blood and urine cx,serial lactate levels,monitor vitals closley,ivfs hydration,monitor urine output and renal profile,iv abx initiated  pt without complaints. Daughter is at bedside and states patient has a steady gait Found to have DVT and started on full dose AC
pt is a 81yo female [facility] ems reports pt tripped over someone else's walker, pt walks without assistance at baseline. pt found to be febrile on arrival 100.9 Admitted for septic workup and evaluation,send blood and urine cx,serial lactate levels,monitor vitals closley,ivfs hydration,monitor urine output and renal profile,iv abx initiated  pt without complaints. Daughter is at bedside and states patient has a steady gait Found to have DVT and started on full dose AC
pt is a 81yo female [facility] ems reports pt tripped over someone else's walker, pt walks without assistance at baseline. pt found to be febrile on arrival 100.9 Admitted for septic workup and evaluation,send blood and urine cx,serial lactate levels,monitor vitals closley,ivfs hydration,monitor urine output and renal profile,iv abx initiated  pt without complaints. Daughter is at bedside and states patient has a steady gait Found to have DVT and started on full dose AC
pt is a 81yo female with pmhx of [facility] ems reports pt tripped over someone else's walker, pt walks without assistance at baseline. pt found to be febrile on arrival 100.9 Admitted for septic workup and evaluation,send blood and urine cx,serial lactate levels,monitor vitals closley,ivfs hydration,monitor urine output and renal profile,iv abx initiated  pt without complaints. Daughter is at bedside and states patient has a steady gait Found to have DVT and started on full dose AC
pt is a 81yo female with [facility]x of [facility] ems reports pt tripped over someone else's walker, pt walks without assistance at baseline. pt found to be febrile on arrival 100.9 Admitted for septic workup and evaluation,send blood and urine cx,serial lactate levels,monitor vitals closley,ivfs hydration,monitor urine output and renal profile,iv abx initiated  pt without complaints. Daughter is at bedside and states patient has a steady gait Found to have DVT and started on full dose AC
pt is a 81yo female with [facility]x of [facility] ems reports pt tripped over someone else's walker, pt walks without assistance at baseline. pt found to be febrile on arrival 100.9 Admitted for septic workup and evaluation,send blood and urine cx,serial lactate levels,monitor vitals closley,ivfs hydration,monitor urine output and renal profile,iv abx initiated  pt without complaints. Daughter is at bedside and states patient has a steady gait Found to have DVT and started on full dose AC
pt is a 81yo female with pmhx of dementia [facility] ems reports pt tripped over someone else's walker, pt walks without assistance at baseline. pt found to be febrile on arrival 100.9 Admitted for septic workup and evaluation,send blood and urine cx,serial lactate levels,monitor vitals closley,ivfs hydration,monitor urine output and renal profile,iv abx initiated  pt without complaints. Daughter is at bedside and states patient has a steady gait Found to have DVT and started on full dose AC
pt is a 81yo female [facility] ems reports pt tripped over someone else's walker, pt walks without assistance at baseline. pt found to be febrile on arrival 100.9 Admitted for septic workup and evaluation,send blood and urine cx,serial lactate levels,monitor vitals closley,ivfs hydration,monitor urine output and renal profile,iv abx initiated  pt without complaints. Daughter is at bedside and states patient has a steady gait Found to have DVT and started on full dose AC
pt is a 83yo female [facility] ems reports pt tripped over someone else's walker, pt walks without assistance at baseline. pt found to be febrile on arrival 100.9 Admitted for septic workup and evaluation,send blood and urine cx,serial lactate levels,monitor vitals closley,ivfs hydration,monitor urine output and renal profile,iv abx initiated  pt without complaints. Daughter is at bedside and states patient has a steady gait Found to have DVT and started on full dose AC
pt is a 83yo female [facility] ems reports pt tripped over someone else's walker, pt walks without assistance at baseline. pt found to be febrile on arrival 100.9 Admitted for septic workup and evaluation,send blood and urine cx,serial lactate levels,monitor vitals closley,ivfs hydration,monitor urine output and renal profile,iv abx initiated  pt without complaints. Daughter is at bedside and states patient has a steady gait Found to have DVT and started on full dose AC
pt is a 83yo female [facility]x [facility] ems reports pt tripped over someone else's walker, pt walks without assistance at baseline. pt found to be febrile on arrival 100.9 Admitted for septic workup and evaluation,send blood and urine cx,serial lactate levels,monitor vitals closley,ivfs hydration,monitor urine output and renal profile,iv abx initiated  pt without complaints. Daughter is at bedside and states patient has a steady gait Found to have DVT and started on full dose AC
pt is a 83yo female with [facility]x [facility] ems reports pt tripped over someone else's walker, pt walks without assistance at baseline. pt found to be febrile on arrival 100.9 Admitted for septic workup and evaluation,send blood and urine cx,serial lactate levels,monitor vitals closley,ivfs hydration,monitor urine output and renal profile,iv abx initiated  pt without complaints. Daughter is at bedside and states patient has a steady gait Found to have DVT and started on full dose AC
pt is a 83yo female with pmhx of dementia [facility] ems reports pt tripped over someone else's walker, pt walks without assistance at baseline. pt found to be febrile on arrival 100.9 Admitted for septic workup and evaluation,send blood and urine cx,serial lactate levels,monitor vitals closley,ivfs hydration,monitor urine output and renal profile,iv abx initiated  pt without complaints. Daughter is at bedside and states patient has a steady gait Found to have DVT and started on full dose AC
pt is a 83yo female [facility] ems reports pt tripped over someone else's walker, pt walks without assistance at baseline. pt found to be febrile on arrival 100.9 Admitted for septic workup and evaluation,send blood and urine cx,serial lactate levels,monitor vitals closley,ivfs hydration,monitor urine output and renal profile,iv abx initiated  pt without complaints. Daughter is at bedside and states patient has a steady gait Found to have DVT and started on full dose AC
pt is a 83yo female [facility] ems reports pt tripped over someone else's walker, pt walks without assistance at baseline. pt found to be febrile on arrival 100.9 Admitted for septic workup and evaluation,send blood and urine cx,serial lactate levels,monitor vitals closley,ivfs hydration,monitor urine output and renal profile,iv abx initiated  pt without complaints. Daughter is at bedside and states patient has a steady gait Found to have DVT and started on full dose AC
pt is a 83yo female with [facility]x of [facility] ems reports pt tripped over someone else's walker, pt walks without assistance at baseline. pt found to be febrile on arrival 100.9 Admitted for septic workup and evaluation,send blood and urine cx,serial lactate levels,monitor vitals closley,ivfs hydration,monitor urine output and renal profile,iv abx initiated  pt without complaints. Daughter is at bedside and states patient has a steady gait Found to have DVT and started on full dose AC
pt is a 83yo female with [facility]x of [facility] ems reports pt tripped over someone else's walker, pt walks without assistance at baseline. pt found to be febrile on arrival 100.9 Admitted for septic workup and evaluation,send blood and urine cx,serial lactate levels,monitor vitals closley,ivfs hydration,monitor urine output and renal profile,iv abx initiated  pt without complaints. Daughter is at bedside and states patient has a steady gait Found to have DVT and started on full dose AC
pt is a 83yo female with pmhx of dementia [facility]. ems reports pt tripped over someone else's walker, pt walks without assistance at baseline. pt found to be febrile on arrival 100.9 Admitted for septic workup and evaluation,send blood and urine cx,serial lactate levels,monitor vitals closley,ivfs hydration,monitor urine output and renal profile,iv abx initiated  pt without complaints. Daughter is at bedside and states patient has a steady gait Found to have DVT and started on full dose AC
pt is a 83yo female with [facility]x [facility] ems reports pt tripped over someone else's walker, pt walks without assistance at baseline. pt found to be febrile on arrival 100.9 Admitted for septic workup and evaluation,send blood and urine cx,serial lactate levels,monitor vitals closley,ivfs hydration,monitor urine output and renal profile,iv abx initiated  pt without complaints. Daughter is at bedside and states patient has a steady gait Found to have DVT and started on full dose AC
pt is a 83yo female with pmhx of dementia [facility]. ems reports pt tripped over someone else's walker, pt walks without assistance at baseline. pt found to be febrile on arrival 100.9 Admitted for septic workup and evaluation,send blood and urine cx,serial lactate levels,monitor vitals closley,ivfs hydration,monitor urine output and renal profile,iv abx initiated  pt without complaints. Daughter is at bedside and states patient has a steady gait Found to have DVT and started on full dose AC
pt is a 83yo female with [facility]x [facility] ems reports pt tripped over someone else's walker, pt walks without assistance at baseline. pt found to be febrile on arrival 100.9 Admitted for septic workup and evaluation,send blood and urine cx,serial lactate levels,monitor vitals closley,ivfs hydration,monitor urine output and renal profile,iv abx initiated  pt without complaints. Daughter is at bedside and states patient has a steady gait Found to have DVT and started on full dose AC
pt is a 83yo female with pmhx of dementia [facility] ems reports pt tripped over someone else's walker, pt walks without assistance at baseline. pt found to be febrile on arrival 100.9 Admitted for septic workup and evaluation,send blood and urine cx,serial lactate levels,monitor vitals closley,ivfs hydration,monitor urine output and renal profile,iv abx initiated  pt without complaints. Daughter is at bedside and states patient has a steady gait Found to have DVT and started on full dose AC
pt is a 83yo female with [facility]x of [facility] ems reports pt tripped over someone else's walker, pt walks without assistance at baseline. pt found to be febrile on arrival 100.9 Admitted for septic workup and evaluation,send blood and urine cx,serial lactate levels,monitor vitals closley,ivfs hydration,monitor urine output and renal profile,iv abx initiated  pt without complaints. Daughter is at bedside and states patient has a steady gait Found to have DVT and started on full dose AC
pt is a 81yo female with [facility]x of [facility] ems reports pt tripped over someone else's walker, pt walks without assistance at baseline. pt found to be febrile on arrival 100.9 Admitted for septic workup and evaluation,send blood and urine cx,serial lactate levels,monitor vitals closley,ivfs hydration,monitor urine output and renal profile,iv abx initiated  pt without complaints. Daughter is at bedside and states patient has a steady gait Found to have DVT and started on full dose AC
pt is a 83yo female [facility] ems reports pt tripped over someone else's walker, pt walks without assistance at baseline. pt found to be febrile on arrival 100.9 Admitted for septic workup and evaluation,send blood and urine cx,serial lactate levels,monitor vitals closley,ivfs hydration,monitor urine output and renal profile,iv abx initiated  pt without complaints. Daughter is at bedside and states patient has a steady gait Found to have DVT and started on full dose AC
pt is a 81yo female with [facility]x of [facility] ems reports pt tripped over someone else's walker, pt walks without assistance at baseline. pt found to be febrile on arrival 100.9 Admitted for septic workup and evaluation,send blood and urine cx,serial lactate levels,monitor vitals closley,ivfs hydration,monitor urine output and renal profile,iv abx initiated  pt without complaints. Daughter is at bedside and states patient has a steady gait Found to have DVT and started on full dose AC
pt is a 81yo female with pmhx of [facility] ems reports pt tripped over someone else's walker, pt walks without assistance at baseline. pt found to be febrile on arrival 100.9 Admitted for septic workup and evaluation,send blood and urine cx,serial lactate levels,monitor vitals closley,ivfs hydration,monitor urine output and renal profile,iv abx initiated  pt without complaints. Daughter is at bedside and states patient has a steady gait Found to have DVT and started on full dose AC
pt is a 81yo female with pmhx of dementia [facility]. ems reports pt tripped over someone else's walker, pt walks without assistance at baseline. pt found to be febrile on arrival 100.9 Admitted for septic workup and evaluation,send blood and urine cx,serial lactate levels,monitor vitals closley,ivfs hydration,monitor urine output and renal profile,iv abx initiated  pt without complaints. Daughter is at bedside and states patient has a steady gait Found to have DVT and started on full dose AC
pt is a 81yo female with [facility]x of [facility] ems reports pt tripped over someone else's walker, pt walks without assistance at baseline. pt found to be febrile on arrival 100.9 Admitted for septic workup and evaluation,send blood and urine cx,serial lactate levels,monitor vitals closley,ivfs hydration,monitor urine output and renal profile,iv abx initiated  pt without complaints. Daughter is at bedside and states patient has a steady gait Found to have DVT and started on full dose AC
pt is a 81yo female with [facility]x of [facility] ems reports pt tripped over someone else's walker, pt walks without assistance at baseline. pt found to be febrile on arrival 100.9 Admitted for septic workup and evaluation,send blood and urine cx,serial lactate levels,monitor vitals closley,ivfs hydration,monitor urine output and renal profile,iv abx initiated  pt without complaints. Daughter is at bedside and states patient has a steady gait Found to have DVT and started on full dose AC
pt is a 81yo female with [facility]x of [facility] ems reports pt tripped over someone else's walker, pt walks without assistance at baseline. pt found to be febrile on arrival 100.9 Admitted for septic workup and evaluation,send blood and urine cx,serial lactate levels,monitor vitals closley,ivfs hydration,monitor urine output and renal profile,iv abx initiated  pt without complaints. Daughter is at bedside and states patient has a steady gait Found to have DVT and started on full dose AC
pt is a 81yo female with [facility]x of [facility] ems reports pt tripped over someone else's walker, pt walks without assistance at baseline. pt found to be febrile on arrival 100.9 Admitted for septic workup and evaluation,send blood and urine cx,serial lactate levels,monitor vitals closley,ivfs hydration,monitor urine output and renal profile,iv abx initiated  pt without complaints. Daughter is at bedside and states patient has a steady gait Found to have DVT and started on full dose AC
pt is a 81yo female with [facility]x of [facility] ems reports pt tripped over someone else's walker, pt walks without assistance at baseline. pt found to be febrile on arrival 100.9 Admitted for septic workup and evaluation,send blood and urine cx,serial lactate levels,monitor vitals closley,ivfs hydration,monitor urine output and renal profile,iv abx initiated  pt without complaints. Daughter is at bedside and states patient has a steady gait Found to have DVT and started on full dose AC
pt is a 81yo female with [facility]hx of [facility] ems reports pt tripped over someone else's walker, pt walks without assistance at baseline. pt found to be febrile on arrival 100.9 Admitted for septic workup and evaluation,send blood and urine cx,serial lactate levels,monitor vitals closley,ivfs hydration,monitor urine output and renal profile,iv abx initiated  pt without complaints. Daughter is at bedside and states patient has a steady gait Found to have DVT and started on full dose AC
pt is a 81yo female with [facility]x of [facility] ems reports pt tripped over someone else's walker, pt walks without assistance at baseline. pt found to be febrile on arrival 100.9 Admitted for septic workup and evaluation,send blood and urine cx,serial lactate levels,monitor vitals closley,ivfs hydration,monitor urine output and renal profile,iv abx initiated  pt without complaints. Daughter is at bedside and states patient has a steady gait Found to have DVT and started on full dose AC
pt is a 81yo female with [facility]x of [facility] ems reports pt tripped over someone else's walker, pt walks without assistance at baseline. pt found to be febrile on arrival 100.9 Admitted for septic workup and evaluation,send blood and urine cx,serial lactate levels,monitor vitals closley,ivfs hydration,monitor urine output and renal profile,iv abx initiated  pt without complaints. Daughter is at bedside and states patient has a steady gait Found to have DVT and started on full dose AC
pt is a 81yo female [facility] ems reports pt tripped over someone else's walker, pt walks without assistance at baseline. pt found to be febrile on arrival 100.9 Admitted for septic workup and evaluation,send blood and urine cx,serial lactate levels,monitor vitals closley,ivfs hydration,monitor urine output and renal profile,iv abx initiated  pt without complaints. Daughter is at bedside and states patient has a steady gait Found to have DVT and started on full dose AC
pt is a 83yo female [facility] ems reports pt tripped over someone else's walker, pt walks without assistance at baseline. pt found to be febrile on arrival 100.9 Admitted for septic workup and evaluation,send blood and urine cx,serial lactate levels,monitor vitals closley,ivfs hydration,monitor urine output and renal profile,iv abx initiated  pt without complaints. Daughter is at bedside and states patient has a steady gait Found to have DVT and started on full dose AC
pt is a 83yo female with [facility]x of [facility] ems reports pt tripped over someone else's walker, pt walks without assistance at baseline. pt found to be febrile on arrival 100.9 Admitted for septic workup and evaluation,send blood and urine cx,serial lactate levels,monitor vitals closley,ivfs hydration,monitor urine output and renal profile,iv abx initiated  pt without complaints. Daughter is at bedside and states patient has a steady gait Found to have DVT and started on full dose AC
pt is a 83yo female [facility] ems reports pt tripped over someone else's walker, pt walks without assistance at baseline. pt found to be febrile on arrival 100.9 Admitted for septic workup and evaluation,send blood and urine cx,serial lactate levels,monitor vitals closley,ivfs hydration,monitor urine output and renal profile,iv abx initiated  pt without complaints. Daughter is at bedside and states patient has a steady gait Found to have DVT and started on full dose AC
pt is a 81yo female [facility] ems reports pt tripped over someone else's walker, pt walks without assistance at baseline. pt found to be febrile on arrival 100.9 Admitted for septic workup and evaluation,send blood and urine cx,serial lactate levels,monitor vitals closley,ivfs hydration,monitor urine output and renal profile,iv abx initiated  pt without complaints. Daughter is at bedside and states patient has a steady gait Found to have DVT and started on full dose AC
pt is a 81yo female [facility] ems reports pt tripped over someone else's walker, pt walks without assistance at baseline. pt found to be febrile on arrival 100.9 Admitted for septic workup and evaluation,send blood and urine cx,serial lactate levels,monitor vitals closley,ivfs hydration,monitor urine output and renal profile,iv abx initiated  pt without complaints. Daughter is at bedside and states patient has a steady gait Found to have DVT and started on full dose AC
pt is a 81yo female [facility] ems reports pt tripped over someone else's walker, pt walks without assistance at baseline. pt found to be febrile on arrival 100.9 Admitted for septic workup and evaluation,send blood and urine cx,serial lactate levels,monitor vitals closley,ivfs hydration,monitor urine output and renal profile,iv abx initiated  pt without complaints. Daughter is at bedside and states patient has a steady gait Found to have DVT and started on full dose AC
pt is a 83yo female [facility] ems reports pt tripped over someone else's walker, pt walks without assistance at baseline. pt found to be febrile on arrival 100.9 Admitted for septic workup and evaluation,send blood and urine cx,serial lactate levels,monitor vitals closley,ivfs hydration,monitor urine output and renal profile,iv abx initiated  pt without complaints. Daughter is at bedside and states patient has a steady gait Found to have DVT and started on full dose AC
pt is a 83yo female [facility] ems reports pt tripped over someone else's walker, pt walks without assistance at baseline. pt found to be febrile on arrival 100.9 Admitted for septic workup and evaluation,send blood and urine cx,serial lactate levels,monitor vitals closley,ivfs hydration,monitor urine output and renal profile,iv abx initiated  pt without complaints. Daughter is at bedside and states patient has a steady gait Found to have DVT and started on full dose AC
pt is a 83yo female with [facility]hx of dementia [facility] ems reports pt tripped over someone else's walker, pt walks without assistance at baseline. pt found to be febrile on arrival 100.9 Admitted for septic workup and evaluation,send blood and urine cx,serial lactate levels,monitor vitals closley,ivfs hydration,monitor urine output and renal profile,iv abx initiated  pt without complaints. Daughter is at bedside and states patient has a steady gait Found to have DVT and started on full dose AC
pt is a 81yo female [facility] ems reports pt tripped over someone else's walker, pt walks without assistance at baseline. pt found to be febrile on arrival 100.9 Admitted for septic workup and evaluation,send blood and urine cx,serial lactate levels,monitor vitals closley,ivfs hydration,monitor urine output and renal profile,iv abx initiated  pt without complaints. Daughter is at bedside and states patient has a steady gait Found to have DVT and started on full dose AC
pt is a 81yo female [facility]s reports pt tripped over someone else's walker, pt walks without assistance at baseline. pt found to be febrile on arrival 100.9 Admitted for septic workup and evaluation,send blood and urine cx,serial lactate levels,monitor vitals closley,ivfs hydration,monitor urine output and renal profile,iv abx initiated  pt without complaints. Daughter is at bedside and states patient has a steady gait Found to have DVT and started on full dose AC
pt is a 81yo female [facility] ems reports pt tripped over someone else's walker, pt walks without assistance at baseline. pt found to be febrile on arrival 100.9 Admitted for septic workup and evaluation,send blood and urine cx,serial lactate levels,monitor vitals closley,ivfs hydration,monitor urine output and renal profile,iv abx initiated  pt without complaints. Daughter is at bedside and states patient has a steady gait Found to have DVT and started on full dose AC
pt is a 83yo female [facility] ems reports pt tripped over someone else's walker, pt walks without assistance at baseline. pt found to be febrile on arrival 100.9 Admitted for septic workup and evaluation,send blood and urine cx,serial lactate levels,monitor vitals closley,ivfs hydration,monitor urine output and renal profile,iv abx initiated  pt without complaints. Daughter is at bedside and states patient has a steady gait Found to have DVT and started on full dose AC
pt is a 83yo female with [facility]x of [facility] ems reports pt tripped over someone else's walker, pt walks without assistance at baseline. pt found to be febrile on arrival 100.9 Admitted for septic workup and evaluation,send blood and urine cx,serial lactate levels,monitor vitals closley,ivfs hydration,monitor urine output and renal profile,iv abx initiated  pt without complaints. Daughter is at bedside and states patient has a steady gait Found to have DVT and started on full dose AC
pt is a 83yo female [facility] ems reports pt tripped over someone else's walker, pt walks without assistance at baseline. pt found to be febrile on arrival 100.9 Admitted for septic workup and evaluation,send blood and urine cx,serial lactate levels,monitor vitals closley,ivfs hydration,monitor urine output and renal profile,iv abx initiated  pt without complaints. Daughter is at bedside and states patient has a steady gait Found to have DVT and started on full dose AC
pt is a 81yo female with pmhx of dementia [facility] ems reports pt tripped over someone else's walker, pt walks without assistance at baseline. pt found to be febrile on arrival 100.9 Admitted for septic workup and evaluation,send blood and urine cx,serial lactate levels,monitor vitals closley,ivfs hydration,monitor urine output and renal profile,iv abx initiated  pt without complaints. Daughter is at bedside and states patient has a steady gait Found to have DVT and started on full dose AC
pt is a 81yo female with [facility]x [facility] ems reports pt tripped over someone else's walker, pt walks without assistance at baseline. pt found to be febrile on arrival 100.9 Admitted for septic workup and evaluation,send blood and urine cx,serial lactate levels,monitor vitals closley,ivfs hydration,monitor urine output and renal profile,iv abx initiated  pt without complaints. Daughter is at bedside and states patient has a steady gait Found to have DVT and started on full dose AC
pt is a 83yo female [facility] ems reports pt tripped over someone else's walker, pt walks without assistance at baseline. pt found to be febrile on arrival 100.9 Admitted for septic workup and evaluation,send blood and urine cx,serial lactate levels,monitor vitals closley,ivfs hydration,monitor urine output and renal profile,iv abx initiated  pt without complaints. Daughter is at bedside and states patient has a steady gait Found to have DVT and started on full dose AC
pt is a 83yo female with [facility]x [facility] ems reports pt tripped over someone else's walker, pt walks without assistance at baseline. pt found to be febrile on arrival 100.9 Admitted for septic workup and evaluation,send blood and urine cx,serial lactate levels,monitor vitals closley,ivfs hydration,monitor urine output and renal profile,iv abx initiated  pt without complaints. Daughter is at bedside and states patient has a steady gait Found to have DVT and started on full dose AC
pt is a 81yo female with [facility] ems reports pt tripped over someone else's walker, pt walks without assistance at baseline. pt found to be febrile on arrival 100.9 Admitted for septic workup and evaluation,send blood and urine cx,serial lactate levels,monitor vitals closley,ivfs hydration,monitor urine output and renal profile,iv abx initiated  pt without complaints. Daughter is at bedside and states patient has a steady gait Found to have DVT and started on full dose AC
pt is a 81yo female with [facility]x of [facility] ems reports pt tripped over someone else's walker, pt walks without assistance at baseline. pt found to be febrile on arrival 100.9 Admitted for septic workup and evaluation,send blood and urine cx,serial lactate levels,monitor vitals closley,ivfs hydration,monitor urine output and renal profile,iv abx initiated  pt without complaints. Daughter is at bedside and states patient has a steady gait Found to have DVT and started on full dose AC
pt is a 81yo female [facility]s reports pt tripped over someone else's walker, pt walks without assistance at baseline. pt found to be febrile on arrival 100.9 Admitted for septic workup and evaluation,send blood and urine cx,serial lactate levels,monitor vitals closley,ivfs hydration,monitor urine output and renal profile,iv abx initiated  pt without complaints. Daughter is at bedside and states patient has a steady gait Found to have DVT and started on full dose AC
pt is a 81yo female with [facility] ems reports pt tripped over someone else's walker, pt walks without assistance at baseline. pt found to be febrile on arrival 100.9 Admitted for septic workup and evaluation,send blood and urine cx,serial lactate levels,monitor vitals closley,ivfs hydration,monitor urine output and renal profile,iv abx initiated  pt without complaints. Daughter is at bedside and states patient has a steady gait Found to have DVT and started on full dose AC
pt is a 83yo female with pmhx of [facility] ems reports pt tripped over someone else's walker, pt walks without assistance at baseline. pt found to be febrile on arrival 100.9 Admitted for septic workup and evaluation,send blood and urine cx,serial lactate levels,monitor vitals closley,ivfs hydration,monitor urine output and renal profile,iv abx initiated  pt without complaints. Daughter is at bedside and states patient has a steady gait Found to have DVT and started on full dose AC

## 2018-11-30 NOTE — PROGRESS NOTE ADULT - ATTENDING COMMENTS
CONTINUE CMO . DISCHARGE TO [facility] WITH COMFORT CARE /HOSPICE EVAL IN PROGRESS ( ACCEPTED FOR HOME HOSPICE )
pt is a 81yo female with pmhx of dementia and htn bib [facility] ems s/p fall. ems reports pt tripped over someone else's walker, pt walks without assistance at baseline. pt found to be febrile on arrival 100.9 Admitted for septic workup and evaluation,send blood and urine cx,serial lactate levels,monitor vitals closley,ivfs hydration,monitor urine output and renal profile,iv abx initiated  pt without complaints. Daughter is at bedside and states patient has a steady gait Found to have DVT and started on full dose AC DISCHARGE TO [facility] IN AM ,XARELTO INITIATION  IS RECOMMENDED BY ,STARTED ON  15 MG PO BID X 21 DAYS AND THEN CONTINUE 20 MG PO QD . SPOKE TO THE DAUGHTER WHO STATES PATIENT MAY BE SAFELY STRATED ON OAC BECAUSE HER GAIT IS VERY STAEDY AND NO X OF FALLS .SHE TRIPPED OVER SOMEBODY ELSE WALLKER BY ACCIDENET AS PER DAUGHTER .SHE IS AWARE OF ALL RISKS ASSCOIATED WITH OAC .
pt is a 83yo female with [facility]x of [facility] ems reports pt tripped over someone else's walker, pt walks without assistance at baseline. pt found to be febrile on arrival 100.9 Admitted for septic workup and evaluation,send blood and urine cx,serial lactate levels,monitor vitals closley,ivfs hydration,monitor urine output and renal profile,iv abx initiated  pt without complaints. Daughter is at bedside and states patient has a steady gait Found to have DVT and started on full dose AC
pt is a 81yo female with pmhx of dementia and htn [facility] ems s/p fall. ems reports pt tripped over someone else's walker, pt walks without assistance at baseline. pt found to be febrile on arrival 100.9 Admitted for septic workup and evaluation,send blood and urine cx,serial lactate levels,monitor vitals closley,ivfs hydration,monitor urine output and renal profile,iv abx initiated  pt without complaints. Daughter is at bedside and states patient has a steady gait Found to have DVT and started on full dose AC DISCHARGE TO [facility] IN AM ,XARELTO INITIATION  IS RECOMMENDED BY ,STARTED ON  15 MG PO BID X 21 DAYS AND THEN CONTINUE 20 MG PO QD . SPOKE TO THE DAUGHTER WHO STATES PATIENT MAY BE SAFELY STRATED ON OAC BECAUSE HER GAIT IS VERY STAEDY AND NO X OF FALLS .SHE TRIPPED OVER SOMEBODY ELSE WALLKER BY ACCIDENET AS PER DAUGHTER .SHE IS AWARE OF ALL RISKS ASSCOIATED WITH OAC .Increased confusion and delirious state reported this morning and constant observation renewed .Patient was trying to ambulate with assistance but was found to be  unsteady today ,she was trying to get out of the bed .She was independent walker before admission as per daughters and no falls in a past reported ( except this admission associated with mechanical trip) PT reeval requested ,may require rehab ,also may need xarelto to be substituted with lovenox ( if goes to [facility] ) or IVC filter may be an option if gait remains unsteady .case was d/w Dr Khalil ,spoke to daughter Tameka on a phone
pt is a 81yo female with pmhx of dementia and htn [facility] ems s/p fall. ems reports pt tripped over someone else's walker, pt walks without assistance at baseline. pt found to be febrile on arrival 100.9 Admitted for septic workup and evaluation,send blood and urine cx,serial lactate levels,monitor vitals closley,ivfs hydration,monitor urine output and renal profile,iv abx initiated  pt without complaints. Daughter is at bedside and states patient has a steady gait Found to have DVT and started on full dose AC DISCHARGE TO [facility] IN AM ,XARELTO INITIATION  IS RECOMMENDED BY ,STARTED ON  15 MG PO BID X 21 DAYS AND THEN CONTINUE 20 MG PO QD . SPOKE TO THE DAUGHTER WHO STATES PATIENT MAY BE SAFELY STRATED ON OAC BECAUSE HER GAIT IS VERY STAEDY AND NO X OF FALLS .SHE TRIPPED OVER SOMEBODY ELSE WALLKER BY ACCIDENET AS PER DAUGHTER .SHE IS AWARE OF ALL RISKS ASSCOIATED WITH OAC .Increased confusion and delirious state reported this morning and constant observation renewed .Patient was trying to ambulate with assistance but was found to be  unsteady today ,she was trying to get out of the bed .She was independent walker before admission as per daughters and no falls in a past reported ( except this admission associated with mechanical trip) PT reeval requested ,may require rehab ,also may need xarelto to be substituted with lovenox ( if goes to [facility] ) or IVC filter may be an option if gait remains unsteady .case was d/w Dr Khalil ,spoke to daughter Tameka on a phone
pt is a 83yo female with pmhx of dementia and htn [facility] ems s/p fall. ems reports pt tripped over someone else's walker, pt walks without assistance at baseline. pt found to be febrile on arrival 100.9 Admitted for septic workup and evaluation,send blood and urine cx,serial lactate levels,monitor vitals closley,ivfs hydration,monitor urine output and renal profile,iv abx initiated  pt without complaints. Daughter is at bedside and states patient has a steady gait Found to have DVT and started on full dose AC DISCHARGE TO [facility] IN AM ,XARELTO INITIATION  IS RECOMMENDED BY ,STARTED ON  15 MG PO BID X 21 DAYS AND THEN CONTINUE 20 MG PO QD . SPOKE TO THE DAUGHTER WHO STATES PATIENT MAY BE SAFELY STRATED ON OAC BECAUSE HER GAIT IS VERY STAEDY AND NO X OF FALLS .SHE TRIPPED OVER SOMEBODY ELSE WALLKER BY ACCIDENET AS PER DAUGHTER .SHE IS AWARE OF ALL RISKS ASSCOIATED WITH OAC .Increased confusion and delirious state reported this morning and constant observation renewed .Patient was trying to ambulate with assistance but was found to be  unsteady today ,she was trying to get out of the bed .She was independent walker before admission as per daughters and no falls in a past reported ( except this admission associated with mechanical trip) PT reeval requested ,may require rehab ,also may need xarelto to be substituted with lovenox ( if goes to [facility] ) or IVC filter may be an option if gait remains unsteady .case was d/w Dr Khalil ,spoke to daughter Tameka Holly Mendiola on a phone DISCHARGE TO LTC WITH CMO /HOSPICE WHEN ARRANGED BY SOCIAL SERVICE.
pt is a 83yo female with pmhx of dementia and htn [facility] ems s/p fall. ems reports pt tripped over someone else's walker, pt walks without assistance at baseline. pt found to be febrile on arrival 100.9 Admitted for septic workup and evaluation,send blood and urine cx,serial lactate levels,monitor vitals closley,ivfs hydration,monitor urine output and renal profile,iv abx initiated  pt without complaints. Daughter is at bedside and states patient has a steady gait Found to have DVT and started on full dose AC DISCHARGE TO [facility] IN AM ,XARELTO INITIATION  IS RECOMMENDED BY ,STARTED ON  15 MG PO BID X 21 DAYS AND THEN CONTINUE 20 MG PO QD . SPOKE TO THE DAUGHTER WHO STATES PATIENT MAY BE SAFELY STRATED ON OAC BECAUSE HER GAIT IS VERY STAEDY AND NO X OF FALLS .SHE TRIPPED OVER SOMEBODY ELSE WALLKER BY ACCIDENET AS PER DAUGHTER .SHE IS AWARE OF ALL RISKS ASSCOIATED WITH OAC .Increased confusion and delirious state reported this morning and constant observation renewed .Patient was trying to ambulate with assistance but was found to be  unsteady today ,she was trying to get out of the bed .She was independent walker before admission as per daughters and no falls in a past reported ( except this admission associated with mechanical trip) PT reeval requested ,may require rehab ,also may need xarelto to be substituted with lovenox ( if goes to [facility] ) or IVC filter may be an option if gait remains unsteady .case was d/w Dr Khalil ,spoke to daughter Tameka on a phone
pt is a 81yo female with pmhx of dementia and htn [facility] ems s/p fall. ems reports pt tripped over someone else's walker, pt walks without assistance at baseline. pt found to be febrile on arrival 100.9 Admitted for septic workup and evaluation,send blood and urine cx,serial lactate levels,monitor vitals closley,ivfs hydration,monitor urine output and renal profile,iv abx initiated  pt without complaints. Daughter is at bedside and states patient has a steady gait Found to have DVT and started on full dose AC DISCHARGE TO [facility] IN AM ,XARELTO INITIATION  IS RECOMMENDED BY ,STARTED ON  15 MG PO BID X 21 DAYS AND THEN CONTINUE 20 MG PO QD . SPOKE TO THE DAUGHTER WHO STATES PATIENT MAY BE SAFELY STRATED ON OAC BECAUSE HER GAIT IS VERY STAEDY AND NO X OF FALLS .SHE TRIPPED OVER SOMEBODY ELSE WALLKER BY ACCIDENET AS PER DAUGHTER .SHE IS AWARE OF ALL RISKS ASSCOIATED WITH OAC .Increased confusion and delirious state reported this morning and constant observation renewed .Patient was trying to ambulate with assistance but was found to be  unsteady today ,she was trying to get out of the bed .She was independent walker before admission as per daughters and no falls in a past reported ( except this admission associated with mechanical trip) PT reeval requested ,may require rehab ,also may need xarelto to be substituted with lovenox ( if goes to [facility] ) or IVC filter may be an option if gait remains unsteady .case was d/w Dr Khalil ,spoke to daughter Tameka Holly Mendiola on a phone DISCHARGE TO LTC WITH CMO /HOSPICE WHEN ARRANGED BY SOCIAL SERVICE.
pt is a 81yo female with pmhx of dementia and htn [facility] ems s/p fall. ems reports pt tripped over someone else's walker, pt walks without assistance at baseline. pt found to be febrile on arrival 100.9 Admitted for septic workup and evaluation,send blood and urine cx,serial lactate levels,monitor vitals closley,ivfs hydration,monitor urine output and renal profile,iv abx initiated  pt without complaints. Daughter is at bedside and states patient has a steady gait Found to have DVT and started on full dose AC DISCHARGE TO [facility] IN AM ,XARELTO INITIATION  IS RECOMMENDED BY ,STARTED ON  15 MG PO BID X 21 DAYS AND THEN CONTINUE 20 MG PO QD . SPOKE TO THE DAUGHTER WHO STATES PATIENT MAY BE SAFELY STRATED ON OAC BECAUSE HER GAIT IS VERY STAEDY AND NO X OF FALLS .SHE TRIPPED OVER SOMEBODY ELSE WALLKER BY ACCIDENET AS PER DAUGHTER .SHE IS AWARE OF ALL RISKS ASSCOIATED WITH OAC .Increased confusion and delirious state reported this morning and constant observation renewed .Patient was trying to ambulate with assistance but was found to be  unsteady today ,she was trying to get out of the bed .She was independent walker before admission as per daughters and no falls in a past reported ( except this admission associated with mechanical trip) PT reeval requested ,may require rehab ,also may need xarelto to be substituted with lovenox ( if goes to [facility] ) or IVC filter may be an option if gait remains unsteady .case was d/w Dr Khalil ,spoke to daughter Jessica & Marlena on a phone HCP JESSICA requests CMO to be initiated today 11/25 /18 ,no further testing ,no labs .DISCHARGE TO LTC WITH CMO /HOSPICE WHEN ARRANGED BY SOCIAL SERVICE.
pt is a 81yo female with pmhx of dementia and htn [facility] ems s/p fall. ems reports pt tripped over someone else's walker, pt walks without assistance at baseline. pt found to be febrile on arrival 100.9 Admitted for septic workup and evaluation,send blood and urine cx,serial lactate levels,monitor vitals closley,ivfs hydration,monitor urine output and renal profile,iv abx initiated  pt without complaints. Daughter is at bedside and states patient has a steady gait Found to have DVT and started on full dose AC DISCHARGE TO [facility] IN AM ,XARELTO INITIATION  IS RECOMMENDED BY ,STARTED ON  15 MG PO BID X 21 DAYS AND THEN CONTINUE 20 MG PO QD . SPOKE TO THE DAUGHTER WHO STATES PATIENT MAY BE SAFELY STRATED ON OAC BECAUSE HER GAIT IS VERY STAEDY AND NO X OF FALLS .SHE TRIPPED OVER SOMEBODY ELSE WALLKER BY ACCIDENET AS PER DAUGHTER .SHE IS AWARE OF ALL RISKS ASSCOIATED WITH OAC .Increased confusion and delirious state reported this morning and constant observation renewed .Patient was trying to ambulate with assistance but was found to be  unsteady today ,she was trying to get out of the bed .She was independent walker before admission as per daughters and no falls in a past reported ( except this admission associated with mechanical trip) PT reeval requested ,may require rehab ,also may need xarelto to be substituted with lovenox ( if goes to [facility] ) or IVC filter may be an option if gait remains unsteady .case was d/w Dr Khalil ,spoke to daughter Jessica & Marlena on a phone HCP JESSICA requests CMO to be initiated today 11/25 /18 ,no further testing ,no labs .DISCHARGE TO LTC WITH CMO /HOSPICE WHEN ARRANGED BY SOCIAL SERVICE.
pt is a 83yo female with pmhx of dementia and htn [facility] ems s/p fall. ems reports pt tripped over someone else's walker, pt walks without assistance at baseline. pt found to be febrile on arrival 100.9 Admitted for septic workup and evaluation,send blood and urine cx,serial lactate levels,monitor vitals closley,ivfs hydration,monitor urine output and renal profile,iv abx initiated  pt without complaints. Daughter is at bedside and states patient has a steady gait Found to have DVT and started on full dose AC DISCHARGE TO [facility] IN AM ,XARELTO INITIATION  IS RECOMMENDED BY ,STARTED ON  15 MG PO BID X 21 DAYS AND THEN CONTINUE 20 MG PO QD . SPOKE TO THE DAUGHTER WHO STATES PATIENT MAY BE SAFELY STRATED ON OAC BECAUSE HER GAIT IS VERY STAEDY AND NO X OF FALLS .SHE TRIPPED OVER SOMEBODY ELSE WALLKER BY ACCIDENET AS PER DAUGHTER .SHE IS AWARE OF ALL RISKS ASSCOIATED WITH OAC .Increased confusion and delirious state reported this morning and constant observation renewed .Patient was trying to ambulate with assistance but was found to be  unsteady today ,she was trying to get out of the bed .She was independent walker before admission as per daughters and no falls in a past reported ( except this admission associated with mechanical trip) PT reeval requested ,may require rehab ,also may need xarelto to be substituted with lovenox ( if goes to [facility] ) or IVC filter may be an option if gait remains unsteady .case was d/w Dr Khalil ,spoke to daughter Tameka Mendiola on a phone
pt is a 81yo female with pmhx of dementia and htn [facility] ems s/p fall. ems reports pt tripped over someone else's walker, pt walks without assistance at baseline. pt found to be febrile on arrival 100.9 Admitted for septic workup and evaluation,send blood and urine cx,serial lactate levels,monitor vitals closley,ivfs hydration,monitor urine output and renal profile,iv abx initiated  pt without complaints. Daughter is at bedside and states patient has a steady gait Found to have DVT and started on full dose AC DISCHARGE TO [facility] IN AM ,XARELTO INITIATION  IS RECOMMENDED BY ,STARTED ON  15 MG PO BID X 21 DAYS AND THEN CONTINUE 20 MG PO QD . SPOKE TO THE DAUGHTER WHO STATES PATIENT MAY BE SAFELY STRATED ON OAC BECAUSE HER GAIT IS VERY STAEDY AND NO X OF FALLS .SHE TRIPPED OVER SOMEBODY ELSE WALLKER BY ACCIDENET AS PER DAUGHTER .SHE IS AWARE OF ALL RISKS ASSCOIATED WITH OAC .Increased confusion and delirious state reported this morning and constant observation renewed .Patient was trying to ambulate with assistance but was found to be  unsteady today ,she was trying to get out of the bed .She was independent walker before admission as per daughters and no falls in a past reported ( except this admission associated with mechanical trip) PT reeval requested ,may require rehab ,also may need xarelto to be substituted with lovenox ( if goes to [facility] ) or IVC filter may be an option if gait remains unsteady .case was d/w Dr Khalil ,spoke to daughter Jessica & Marlena on a phone HCP JESSICA requests CMO to be initiated today 11/25 /18 ,no further testing ,no labs .DISCHARGE TO LTC WITH CMO /HOSPICE WHEN ARRANGED BY SOCIAL SERVICE.
pt is a 81yo female with pmhx of dementia and htn [facility] ems s/p fall. ems reports pt tripped over someone else's walker, pt walks without assistance at baseline. pt found to be febrile on arrival 100.9 Admitted for septic workup and evaluation,send blood and urine cx,serial lactate levels,monitor vitals closley,ivfs hydration,monitor urine output and renal profile,iv abx initiated  pt without complaints. Daughter is at bedside and states patient has a steady gait Found to have DVT and started on full dose AC DISCHARGE TO [facility] IN AM ,XARELTO INITIATION  IS RECOMMENDED BY ,STARTED ON  15 MG PO BID X 21 DAYS AND THEN CONTINUE 20 MG PO QD . SPOKE TO THE DAUGHTER WHO STATES PATIENT MAY BE SAFELY STRATED ON OAC BECAUSE HER GAIT IS VERY STAEDY AND NO X OF FALLS .SHE TRIPPED OVER SOMEBODY ELSE WALLKER BY ACCIDENET AS PER DAUGHTER .SHE IS AWARE OF ALL RISKS ASSCOIATED WITH OAC .Increased confusion and delirious state reported this morning and constant observation renewed .Patient was trying to ambulate with assistance but was found to be  unsteady today ,she was trying to get out of the bed .She was independent walker before admission as per daughters and no falls in a past reported ( except this admission associated with mechanical trip) PT reeval requested ,may require rehab ,also may need xarelto to be substituted with lovenox ( if goes to [facility] ) or IVC filter may be an option if gait remains unsteady .case was d/w Dr Khalil ,spoke to daughter Tameka on a phone
pt is a 83yo female with pmhx of dementia and htn [facility] ems s/p fall. ems reports pt tripped over someone else's walker, pt walks without assistance at baseline. pt found to be febrile on arrival 100.9 Admitted for septic workup and evaluation,send blood and urine cx,serial lactate levels,monitor vitals closley,ivfs hydration,monitor urine output and renal profile,iv abx initiated  pt without complaints. Daughter is at bedside and states patient has a steady gait Found to have DVT and started on full dose AC DISCHARGE TO [facility] IN AM ,XARELTO INITIATION  IS RECOMMENDED BY ,STARTED ON  15 MG PO BID X 21 DAYS AND THEN CONTINUE 20 MG PO QD . SPOKE TO THE DAUGHTER WHO STATES PATIENT MAY BE SAFELY STRATED ON OAC BECAUSE HER GAIT IS VERY STAEDY AND NO X OF FALLS .SHE TRIPPED OVER SOMEBODY ELSE WALLKER BY ACCIDENET AS PER DAUGHTER .SHE IS AWARE OF ALL RISKS ASSCOIATED WITH OAC .Increased confusion and delirious state reported this morning and constant observation renewed .Patient was trying to ambulate with assistance but was found to be  unsteady today ,she was trying to get out of the bed .She was independent walker before admission as per daughters and no falls in a past reported ( except this admission associated with mechanical trip) PT reeval requested ,may require rehab ,also may need xarelto to be substituted with lovenox ( if goes to [facility] ) or IVC filter may be an option if gait remains unsteady .case was d/w Dr Khalil ,spoke to daughter Jessica & Marlena on a phone HCP JESSICA requests CMO to be initiated today 11/25 /18 ,no further testing ,no labs .DISCHARGE TO LTC WITH CMO /HOSPICE WHEN ARRANGED BY SOCIAL SERVICE.
pt is a 81yo female with pmhx of dementia and htn [facility] ems s/p fall. ems reports pt tripped over someone else's walker, pt walks without assistance at baseline. pt found to be febrile on arrival 100.9 Admitted for septic workup and evaluation,send blood and urine cx,serial lactate levels,monitor vitals closley,ivfs hydration,monitor urine output and renal profile,iv abx initiated  pt without complaints. Daughter is at bedside and states patient has a steady gait Found to have DVT and started on full dose AC DISCHARGE TO [facility] IN AM ,XARELTO INITIATION  IS RECOMMENDED BY ,STARTED ON  15 MG PO BID X 21 DAYS AND THEN CONTINUE 20 MG PO QD . SPOKE TO THE DAUGHTER WHO STATES PATIENT MAY BE SAFELY STRATED ON OAC BECAUSE HER GAIT IS VERY STAEDY AND NO X OF FALLS .SHE TRIPPED OVER SOMEBODY ELSE WALLKER BY ACCIDENET AS PER DAUGHTER .SHE IS AWARE OF ALL RISKS ASSCOIATED WITH OAC .Increased confusion and delirious state reported this morning and constant observation renewed .Patient was trying to ambulate with assistance but was found to be  unsteady today ,she was trying to get out of the bed .She was independent walker before admission as per daughters and no falls in a past reported ( except this admission associated with mechanical trip) PT reeval requested ,may require rehab ,also may need xarelto to be substituted with lovenox ( if goes to [facility] ) or IVC filter may be an option if gait remains unsteady .case was d/w Dr Khalil ,spoke to daughter Jessica & Marlena on a phone HCP JESSICA requests CMO to be initiated today 11/25 /18 ,no further testing ,no labs .DISCHARGE TO LTC WITH CMO /HOSPICE WHEN ARRANGED BY SOCIAL SERVICE.

## 2018-11-30 NOTE — PROGRESS NOTE ADULT - PROBLEM SELECTOR PLAN 1
SERIAL BMP ,CONTINUE IV FLUIDS ,NEPHROLOGY CONS ,RENAL US Renal profile is noted and case d/w Dr Olivares ,us kidney ordered to rule out obstructive uropathy .daughter JESSICA requests workup and tx of ANNY to be provided prior to d/c from the hospital with [facility] . SHILPA IS INFORMED
abdominal xary to rule out ileus /sbo ,bowel regimen added
abdominal xray to rule out ileus /sbo ,bowel regimen added
full dose ac ,vasc sx cons
warm compresses to left cheek hematoma ,tylenol prn ,fall precautions
ANNY (acute kidney injury). Recommendation: ACUTE RENAL FAILURE: due to dehydration and prerenal   continue with iv fluid ,   Serum creatinine is increased    , approximating GFR is decreased    ml/min.   There is  progression . No uremic symptoms    No evidence of anemia.  Fluid status stable.  Will continue to avoid nephrotoxic drugs.  Patient remains asymptomatic.   Continue current therapy.
ANNY (acute kidney injury). Recommendation: ACUTE RENAL FAILURE: due to dehydration and prerenal   continue with iv fluid ,   Serum creatinine is increased    , approximating GFR is decreased    ml/min.   There is  progression . No uremic symptoms    No evidence of anemia.  Fluid status stable.  Will continue to avoid nephrotoxic drugs.  Patient remains asymptomatic.   Continue current therapy.
abdominal xray to rule out ileus /sbo ,bowel regimen added
continue current managmnet and medications
full dose ac ,vasc sx cons  hypokalemia is  supplemented   hypernatremia continue with increase water intake will f/u with bmp and is improved
full dose ac ,vasc sx cons  hypokalemia is  supplemented by dr musa   hypernatremia continue with increase water intake will f/u with bmp
full dose ac ,vasc sx cons  hypokalemia will supplemented
full dose ac ,vasc sx cons  hypokalemia will supplemented  hypernatremia continue with increase water intake
hypokalemia is  supplemented   hypernatremia continue with increase water intake will f/u with bmp and is improved
hypokalemia is  supplemented   hypernatremia continue with increase water intake will f/u with bmp and is improved
warm compresses to left cheek hematoma ,tylenol prn ,fall precautions Increased confusion and delirious state reported this morning and constant observation renewed .Patient was trying to ambulate with assistance but was found to be  unsteady today ,she was trying to get out of the bed .She was independent walker before admission as per daughters and no falls in a past reported ( except this admission associated with mechanical trip) PT reeval requested ,may require rehab ,also may need xarelto to be substituted with lovenox ( if goes to [facility] ) or IVC filter may be an option if gait remains unsteady .case was d/w Dr Khalil ,spoke to daughter Tameka on a phone
warm compresses to left cheek hematoma ,tylenol prn ,fall precautions Increased confusion and delirious state reported this morning and constant observation renewed .Patient was trying to ambulate with assistance but was found to be  unsteady today ,she was trying to get out of the bed .She was independent walker before admission as per daughters and no falls in a past reported ( except this admission associated with mechanical trip) PT reeval requested ,may require rehab ,also may need xarelto to be substituted with lovenox ( if goes to [facility] ) or IVC filter may be an option if gait remains unsteady .case was d/w Dr Khalil ,spoke to daughter Tameka on a phone
warm compresses to left cheek hematoma ,tylenol prn ,fall precautions Increased confusion and delirious state reported this morning and constant observation renewed .Patient was trying to ambulate with assistance but was found to be  unsteady today ,she was trying to get out of the bed .She was independent walker before admission as per daughters and no falls in a past reported ( except this admission associated with mechanical trip) PT reeval requested ,may require rehab ,also may need xarelto to be substituted with lovenox ( if goes to [facility] ) or IVC filter may be an option if gait remains unsteady .case was d/w Dr Khalil ,spoke to daughter Tameka on a phone
warm compresses to left cheek hematoma ,tylenol prn ,fall precautions Increased confusion and delirious state reported this morning and constant observation renewed .Patient was trying to ambulate with assistance but was found to be  unsteady today ,she was trying to get out of the bed .She was independent walker before admission as per daughters and no falls in a past reported ( except this admission associated with mechanical trip) PT reeval requested ,may require rehab ,also may need xarelto to be substituted with lovenox ( if goes to [facility] ) or IVC filter may be an option if gait remains unsteady .case was d/w Dr Khalil ,spoke to daughter Tameka on a phone
warm compresses to left cheek hematoma ,tylenol prn ,fall precautions Increased confusion and delirious state reported this morning and constant observation renewed .Patient was trying to ambulate with assistance but was found to be  unsteady today ,she was trying to get out of the bed .She was independent walker before admission as per daughters and no falls in a past reported ( except this admission associated with mechanical trip) PT reeval requested ,may require rehab ,also may need xarelto to be substituted with lovenox ( if goes to [facility] ) or IVC filter may be an option if gait remains unsteady .case was d/w Dr Khalil ,spoke to daughter Tameka on a phone
SERIAL BMP ,CONTINUE IV FLUIDS ,NEPHROLOGY CONS ,RENAL US Renal profile is noted and case d/w Dr Olivares ,us kidney reviewed with  cons
warm compresses to left cheek hematoma ,tylenol prn ,fall precautions Increased confusion and delirious state reported this morning and constant observation renewed .Patient was trying to ambulate with assistance but was found to be  unsteady today ,she was trying to get out of the bed .She was independent walker before admission as per daughters and no falls in a past reported ( except this admission associated with mechanical trip) PT reeval requested ,may require rehab ,also may need xarelto to be substituted with lovenox ( if goes to [facility] ) or IVC filter may be an option if gait remains unsteady .case was d/w Dr Khalil ,spoke to daughter Tameka on a phone
warm compresses to left cheek hematoma ,tylenol prn ,fall precautions Increased confusion and delirious state reported this morning and constant observation renewed .Patient was trying to ambulate with assistance but was found to be  unsteady today ,she was trying to get out of the bed .She was independent walker before admission as per daughters and no falls in a past reported ( except this admission associated with mechanical trip) PT reeval requested ,may require rehab ,also may need xarelto to be substituted with lovenox ( if goes to [facility] ) or IVC filter may be an option if gait remains unsteady .case was d/w Dr Khalil ,spoke to daughter Tameka on a phone
warm compresses to left cheek hematoma ,tylenol prn ,fall precautions

## 2018-11-30 NOTE — PROGRESS NOTE ADULT - SUBJECTIVE AND OBJECTIVE BOX
CHIEF COMPLAINT/ PRESENT FINDINGS:    had dementia   hx retention       ****************************************************************************************************  PHYSICAL EXAM:    Vital Signs Last 24 Hrs  T(C): 36.8 (29 Nov 2018 22:01), Max: 36.8 (29 Nov 2018 22:01)  T(F): 98.2 (29 Nov 2018 22:01), Max: 98.2 (29 Nov 2018 22:01)  HR: 83 (29 Nov 2018 22:01) (83 - 85)  BP: 113/55 (29 Nov 2018 22:01) (100/60 - 113/55)  BP(mean): --  RR: 16 (29 Nov 2018 22:01) (16 - 16)  SpO2: 97% (29 Nov 2018 22:01) (95% - 97%)    GENERAL: confused     ABDOMEN: soft   has becerril drains clear urine     BACK: no cva tenderness     LOWER EXTREMITIES:    NEUROLOGICAL:    **********************************************************************************************************  LABS:                        10.3   8.83  )-----------( 308      ( 30 Nov 2018 08:18 )             31.7     11-30    143  |  105  |  38<H>  ----------------------------<  101<H>  3.7   |  32<H>  |  1.30    Ca    8.8      30 Nov 2018 08:18    TPro  6.7  /  Alb  2.2<L>  /  TBili  0.4  /  DBili  x   /  AST  44<H>  /  ALT  36  /  AlkPhos  118  11-29        Urine Culture:     RADIOLOGY & ADDITIONAL STUDIES:      IMPRESSION:  improved creatinine     PLAN: consider voiding trial

## 2018-11-30 NOTE — PROGRESS NOTE ADULT - ASSESSMENT
pt is a 81yo female with pmhx of [facility] ems reports pt tripped over someone else's walker, pt walks without assistance at baseline. pt found to be febrile on arrival 100.9 Admitted for septic workup and evaluation,send blood and urine cx,serial lactate levels,monitor vitals closley,ivfs hydration,monitor urine output and renal profile,iv abx initiated  pt without complaints. Daughter is at bedside and states patient has a steady gait Found to have DVT and started on full dose AC
pt is a 81yo female [facility]x [facility] ems reports pt tripped over someone else's walker, pt walks without assistance at baseline. pt found to be febrile on arrival 100.9 Admitted for septic workup and evaluation,send blood and urine cx,serial lactate levels,monitor vitals closley,ivfs hydration,monitor urine output and renal profile,iv abx initiated  pt without complaints. Daughter is at bedside and states patient has a steady gait Found to have DVT and started on full dose AC
pt is a 81yo female [facility] ems reports pt tripped over someone else's walker, pt walks without assistance at baseline. pt found to be febrile on arrival 100.9 Admitted for septic workup and evaluation,send blood and urine cx,serial lactate levels,monitor vitals closley,ivfs hydration,monitor urine output and renal profile,iv abx initiated  pt without complaints. Daughter is at bedside and states patient has a steady gait Found to have DVT and started on full dose AC
pt [facility] a 81yo female [facility] ems reports pt tripped over someone else's walker, pt walks without assistance at baseline. pt found to be febrile on arrival 100.9 Admitted for septic workup and evaluation,send blood and urine cx,serial lactate levels,monitor vitals closley,ivfs hydration,monitor urine output and renal profile,iv abx initiated  pt without complaints. Daughter is at bedside and states patient has a steady gait Found to have DVT and started on full dose AC
pt is a 83yo female [facility] ems reports pt tripped over someone else's walker, pt walks without assistance at baseline. pt found to be febrile on arrival 100.9 Admitted for septic workup and evaluation,send blood and urine cx,serial lactate levels,monitor vitals closley,ivfs hydration,monitor urine output and renal profile,iv abx initiated  pt without complaints. Daughter is at bedside and states patient has a steady gait Found to have DVT and started on full dose AC
pt is a 81yo female [facility] ems reports pt tripped over someone else's walker, pt walks without assistance at baseline. pt found to be febrile on arrival 100.9 Admitted for septic workup and evaluation,send blood and urine cx,serial lactate levels,monitor vitals closley,ivfs hydration,monitor urine output and renal profile,iv abx initiated  pt without complaints. Daughter is at bedside and states patient has a steady gait Found to have DVT and started on full dose AC
pt is a 81yo female [facility] ems reports pt tripped over someone else's walker, pt walks without assistance at baseline. pt found to be febrile on arrival 100.9 Admitted for septic workup and evaluation,send blood and urine cx,serial lactate levels,monitor vitals closley,ivfs hydration,monitor urine output and renal profile,iv abx initiated  pt without complaints. Daughter is at bedside and states patient has a steady gait Found to have DVT and started on full dose AC
pt is a 81yo female [facility] ems reports pt tripped over someone else's walker, pt walks without assistance at baseline. pt found to be febrile on arrival 100.9 Admitted for septic workup and evaluation,send blood and urine cx,serial lactate levels,monitor vitals closley,ivfs hydration,monitor urine output and renal profile,iv abx initiated  pt without complaints. Daughter is at bedside and states patient has a steady gait Found to have DVT and started on full dose AC
pt is a 81yo female [facility] ems reports pt tripped over someone else's walker, pt walks without assistance at baseline. pt found to be febrile on arrival 100.9 Admitted for septic workup and evaluation,send blood and urine cx,serial lactate levels,monitor vitals closley,ivfs hydration,monitor urine output and renal profile,iv abx initiated  pt without complaints. Daughter is at bedside and states patient has a steady gait Found to have DVT and started on full dose AC
pt is a 81yo female [facility] ems reports pt tripped over someone else's walker, pt walks without assistance at baseline. pt found to be febrile on arrival 100.9 Admitted for septic workup and evaluation,send blood and urine cx,serial lactate levels,monitor vitals closley,ivfs hydration,monitor urine output and renal profile,iv abx initiated  pt without complaints. Daughter is at bedside and states patient has a steady gait Found to have DVT and started on full dose AC
pt is a 81yo female [facility] ems reports pt tripped over someone else's walker, pt walks without assistance at baseline. pt found to be febrile on arrival 100.9 Admitted for septic workup and evaluation,send blood and urine cx,serial lactate levels,monitor vitals closley,ivfs hydration,monitor urine output and renal profile,iv abx initiated  pt without complaints. Daughter is at bedside and states patient has a steady gait Found to have DVT and started on full dose AC
pt is a 81yo female [facility] ems reports pt tripped over someone else's walker, pt walks without assistance at baseline. pt found to be febrile on arrival 100.9 Admitted for septic workup and evaluation,send blood and urine cx,serial lactate levels,monitor vitals closley,ivfs hydration,monitor urine output and renal profile,iv abx initiated  pt without complaints. Daughter is at bedside and states patient has a steady gait Found to have DVT and started on full dose AC
pt is a 81yo female with [facility] ems reports pt tripped over someone else's walker, pt walks without assistance at baseline. pt found to be febrile on arrival 100.9 Admitted for septic workup and evaluation,send blood and urine cx,serial lactate levels,monitor vitals closley,ivfs hydration,monitor urine output and renal profile,iv abx initiated  pt without complaints. Daughter is at bedside and states patient has a steady gait Found to have DVT and started on full dose AC
pt is a 81yo female with [facility]x of [facility] ems reports pt tripped over someone else's walker, pt walks without assistance at baseline. pt found to carmen ,
pt is a 81yo female with [facility] ems reports pt tripped over someone else's walker, pt walks without assistance at baseline. pt found to be febrile on arrival 100.9 Admitted for septic workup and evaluation,send blood and urine cx,serial lactate levels,monitor vitals closley,ivfs hydration,monitor urine output and renal profile,iv abx initiated  pt without complaints. Daughter is at bedside and states patient has a steady gait Found to have DVT and started on full dose AC
pt is a 81yo female [facility] ems reports pt tripped over someone else's walker, pt walks without assistance at baseline. pt found to be febrile on arrival 100.9 Admitted for septic workup and evaluation,send blood and urine cx,serial lactate levels,monitor vitals closley,ivfs hydration,monitor urine output and renal profile,iv abx initiated  pt without complaints. Daughter is at bedside and states patient has a steady gait Found to have DVT and started on full dose AC
pt is a 83yo female with [facility]x of [facility] ems reports pt tripped over someone else's walker, pt walks without assistance at baseline. pt found to carmen ,
pt is a 83yo female [facility] ems reports pt tripped over someone else's walker, pt walks without assistance at baseline. pt found to be febrile on arrival 100.9 Admitted for septic workup and evaluation,send blood and urine cx,serial lactate levels,monitor vitals closley,ivfs hydration,monitor urine output and renal profile,iv abx initiated  pt without complaints. Daughter is at bedside and states patient has a steady gait Found to have DVT and started on full dose AC
pt is a 83yo female with [facility] ems reports pt tripped over someone else's walker, pt walks without assistance at baseline. pt found to be febrile on arrival 100.9 Admitted for septic workup and evaluation,send blood and urine cx,serial lactate levels,monitor vitals closley,ivfs hydration,monitor urine output and renal profile,iv abx initiated  pt without complaints. Daughter is at bedside and states patient has a steady gait Found to have DVT and started on full dose AC
pt is a 83yo female [facility] ems reports pt tripped over someone else's walker, pt walks without assistance at baseline. pt found to be febrile on arrival 100.9 Admitted for septic workup and evaluation,send blood and urine cx,serial lactate levels,monitor vitals closley,ivfs hydration,monitor urine output and renal profile,iv abx initiated  pt without complaints. Daughter is at bedside and states patient has a steady gait Found to have DVT and started on full dose AC
pt is a 83yo female [facility] ems reports pt tripped over someone else's walker, pt walks without assistance at baseline. pt found to be febrile on arrival 100.9 Admitted for septic workup and evaluation,send blood and urine cx,serial lactate levels,monitor vitals closley,ivfs hydration,monitor urine output and renal profile,iv abx initiated  pt without complaints. Daughter is at bedside and states patient has a steady gait Found to have DVT and started on full dose AC
s/p fall - head trauma  dvt  hypertension
pt is a 81yo female [facility] ems reports pt tripped over someone else's walker, pt walks without assistance at baseline. pt found to be febrile on arrival 100.9 Admitted for septic workup and evaluation,send blood and urine cx,serial lactate levels,monitor vitals closley,ivfs hydration,monitor urine output and renal profile,iv abx initiated  pt without complaints. Daughter is at bedside and states patient has a steady gait Found to have DVT and started on full dose AC
pt is a 83yo female [facility] ems reports pt tripped over someone else's walker, pt walks without assistance at baseline. pt found to be febrile on arrival 100.9 Admitted for septic workup and evaluation,send blood and urine cx,serial lactate levels,monitor vitals closley,ivfs hydration,monitor urine output and renal profile,iv abx initiated  pt without complaints. Daughter is at bedside and states patient has a steady gait Found to have DVT and started on full dose AC
pt is a 83yo female with pmhx of dementia [facility] ems reports pt tripped over someone else's walker, pt walks without assistance at baseline. pt found to be febrile on arrival 100.9 Admitted for septic workup and evaluation,send blood and urine cx,serial lactate levels,monitor vitals closley,ivfs hydration,monitor urine output and renal profile,iv abx initiated  pt without complaints. Daughter is at bedside and states patient has a steady gait Found to have DVT and started on full dose AC
pt is a 81yo female [facility] ems reports pt tripped over someone else's walker, pt walks without assistance at baseline. pt found to be febrile on arrival 100.9 Admitted for septic workup and evaluation,send blood and urine cx,serial lactate levels,monitor vitals closley,ivfs hydration,monitor urine output and renal profile,iv abx initiated  pt without complaints. Daughter is at bedside and states patient has a steady gait Found to have DVT and started on full dose AC
pt is a 83yo female with pm[facility]x of [facility]. ems reports pt tripped over someone else's walker, pt walks without assistance at baseline. pt found to be febrile on arrival 100.9 Admitted for septic workup and evaluation,send blood and urine cx,serial lactate levels,monitor vitals closley,ivfs hydration,monitor urine output and renal profile,iv abx initiated  pt without complaints. Daughter is at bedside and states patient has a steady gait Found to have DVT and started on full dose AC

## 2018-11-30 NOTE — PROGRESS NOTE ADULT - PROVIDER SPECIALTY LIST ADULT
Cardiology
Hospitalist
Infectious Disease
Nephrology
Neurology
Psychiatry
Pulmonology
Urology
Internal Medicine
Nephrology
Hospitalist

## 2018-11-30 NOTE — PROGRESS NOTE ADULT - PROBLEM SELECTOR PROBLEM 2
Constipation
DVT (deep venous thrombosis)
PNA (pneumonia)
DVT (deep venous thrombosis)
Hypernatremia
Hypernatremia
PNA (pneumonia)
DVT (deep venous thrombosis)
Constipation
DVT (deep venous thrombosis)

## 2018-12-12 NOTE — SWALLOW BEDSIDE ASSESSMENT ADULT - SWALLOW EVAL: RECOMMENDED FEEDING/EATING TECHNIQUES
Last EGD in 2016 showed moderate to severe esophagitis, most likely this has healed by this point is the patient appears asymptomatic, but should exclude underlying Givens's which could not be adequately assessed at the time of her last scope with the acute inflammation going on at that time    - Plan for EGD along with colonoscopy    -  Patient was explained about  the risks and benefits of the procedure  Risks including but not limited to bleeding, infection, perforation were explained in detail  Also explained about less than 100% sensitivity with the exam and other alternatives  -  Patient was explained about the lifestyle and dietary modifications  Advised to avoid fatty foods, chocolates, caffeine, alcohol and any other triggering foods  Avoid eating for at least 3 hours before going to bed  crush medication (when feasible)/position upright (90 degrees)/small sips/bites/allow for swallow between intakes/check mouth frequently for oral residue/pocketing/alternate food with liquid

## 2019-02-04 NOTE — PROGRESS NOTE ADULT - SUBJECTIVE AND OBJECTIVE BOX
Patient is a 82y Female whom presented to the hospital with carmen     PAST MEDICAL & SURGICAL HISTORY:  Anxiety  HTN (hypertension)  Depression  Dementia  No significant past surgical history      MEDICATIONS  (STANDING):  dextrose 5%. 1000 milliLiter(s) (100 mL/Hr) IV Continuous <Continuous>  docusate sodium 100 milliGRAM(s) Oral two times a day  enoxaparin Injectable 50 milliGRAM(s) SubCutaneous every 24 hours  mirtazapine 15 milliGRAM(s) Oral at bedtime  polyethylene glycol 3350 17 Gram(s) Oral daily  sertraline 25 milliGRAM(s) Oral daily  sertraline 50 milliGRAM(s) Oral daily      Allergies    No Known Allergies    Intolerances        SOCIAL HISTORY:  Denies ETOh,Smoking,     FAMILY HISTORY:  No pertinent family history in first degree relatives      REVIEW OF SYSTEMS:  unable to get a good ros         PHYSICAL EXAM:    Constitutional: NAD  HEENT: conjunctive   clear   Neck:  No JVD  Respiratory: decrease bs b/l   Cardiovascular: S1 and S2  Gastrointestinal: BS+, soft, NT/ND  Extremities: trace  peripheral edema  Neurological:  no focal deficits  Psychiatric: unable to obtained   Skin: dry   Access: Not applicable                          10.3   8.83  )-----------( 308      ( 30 Nov 2018 08:18 )             31.7       CBC Full  -  ( 30 Nov 2018 08:18 )  WBC Count : 8.83 K/uL  Hemoglobin : 10.3 g/dL  Hematocrit : 31.7 %  Platelet Count - Automated : 308 K/uL  Mean Cell Volume : 101.3 fl  Mean Cell Hemoglobin : 32.9 pg  Mean Cell Hemoglobin Concentration : 32.5 gm/dL  Auto Neutrophil # : x  Auto Lymphocyte # : x  Auto Monocyte # : x  Auto Eosinophil # : x  Auto Basophil # : x  Auto Neutrophil % : x  Auto Lymphocyte % : x  Auto Monocyte % : x  Auto Eosinophil % : x  Auto Basophil % : x      11-30    143  |  105  |  38<H>  ----------------------------<  101<H>  3.7   |  32<H>  |  1.30    Ca    8.8      30 Nov 2018 08:18    TPro  6.7  /  Alb  2.2<L>  /  TBili  0.4  /  DBili  x   /  AST  44<H>  /  ALT  36  /  AlkPhos  118  11-29      CAPILLARY BLOOD GLUCOSE          Vital Signs Last 24 Hrs  T(C): 36.8 (29 Nov 2018 22:01), Max: 36.8 (29 Nov 2018 22:01)  T(F): 98.2 (29 Nov 2018 22:01), Max: 98.2 (29 Nov 2018 22:01)  HR: 83 (29 Nov 2018 22:01) (83 - 85)  BP: 113/55 (29 Nov 2018 22:01) (100/60 - 113/55)  BP(mean): --  RR: 16 (29 Nov 2018 22:01) (16 - 16)  SpO2: 97% (29 Nov 2018 22:01) (95% - 97%) Offered and patient declined

## 2021-02-24 NOTE — DISCHARGE NOTE ADULT - IF YOU ARE A SMOKER, IT IS IMPORTANT FOR YOUR HEALTH TO STOP SMOKING. PLEASE BE AWARE THAT SECOND HAND SMOKE IS ALSO HARMFUL.
How Severe Is Your Acne?: moderate Is This A New Presentation, Or A Follow-Up?: Follow Up Acne What Type Of Note Output Would You Prefer (Optional)?: Standard Output Additional Comments (Use Complete Sentences): Patients current treatment plan is not controlling her acne very well. She would like to see if there is anything other than Accutane that she can try since she has done two courses of Accutane in the past and would to hold off on that. Statement Selected

## 2021-05-11 NOTE — BEHAVIORAL HEALTH ASSESSMENT NOTE - NSBHTIMEBASEDBILLING_PSY_A_CORE
Daily Note     Today's date: 2021  Patient name: Jaquelin Patricia  : 1947  MRN: [id number]  Referring provider: Rufino Barnett PA-C  Dx:   Encounter Diagnosis     ICD-10-CM    1  Cerebrovascular accident (CVA), unspecified mechanism ([facility] [address] )  I63 9                   Subjective: Walking around with the [facility]  Feeling ok inside but have not yet walked up the street yet  L shoulder is sore      Objective: See treatment diary below      Assessment: Tolerated treatment well  Denied any increase pain/discomfort  Pt with good endurance pt deferred any seated rest with standing TE/neuro exercises  Patient provided verbal/tactile cues prn for proper form and technique with exercises completed this date 1* focus L hip flex/ABD/ADD  Patient demonstrated fatigue post treatment      Plan: Continue per plan of care           Precautions s/p CVA  RE-EVAL 21    Precautions: s/p CVA, HTN (monitor BP) , DM , FALL RISK   Re-eval Date:     Date        Visit Count 6       FOTO completed       Pain In        Pain Out            Manuals        Stretching L HS, hip flexors, L calf  Resume prn    HS, ITB, piriformis to tolerance                               Neuro Re-Ed        Romberg EC floor/foam        Semi tandem floor-->Foam EO/EC         Dynamic reaching         Natus 35 min  4 corners, WS S/S, Step up 4"  Side step, step fwd  2 min trials  W/ SPC  CS/CGA  Manual/tactile cues       Side step 4x 10 feet  SPC  CS/CGA  Cues hip flex                       Ther Ex        Nustep  L4 10 min  LEs only   6 min  BL UE 4 min       Marches    LAQ         Hip add    Hip ABD         Sit to stands         Standing SLRs         Leg Press Resume  LLE only  Cues hip IR/ medial heel focus  2x10  30#       HRs        Stairs        Knee flex/extn Resume  11# L  22# R  3x10       Ther Activity                        Gait Training SPC  Pregait   Step fwd/bkwd  10x ea  CS  Verbal/tactile cues    Resume  Level  Head turns  Cues AD placement, step through, posture awareness,  L hip flex * consider outside upcoming      Add head turns, obstacles,unstable surfaces                 Modalities no

## 2021-08-24 NOTE — PROGRESS NOTE ADULT - PROBLEM SELECTOR PLAN 2
f/u blood and urine cx,serial lactate levels,monitor vitals closley,ivfs hydration,monitor urine output and renal profile,iv abx prn no

## 2021-09-16 NOTE — ED PROVIDER NOTE - PROGRESS NOTE DETAILS
Dr. Cook is calling from radiology to speak to a nurse for Dr. Bradford per patient is there waiting at radiology.   felt better sleeping at present ct result negative

## 2022-10-25 NOTE — PATIENT PROFILE ADULT - PRIMARY ROLES/RESPONSIBILITIES
Azithromycin Pregnancy And Lactation Text: This medication is considered safe during pregnancy and is also secreted in breast milk. none

## 2024-01-17 NOTE — DISCHARGE NOTE ADULT - NSFTFSERV1RD_GEN_ALL_CORE
Pt left a VM regarding his INR appt at 11:57 am today. Returned call, no answer and left a VM with direct [facility] ph # to call back.    Pt currently has an INR appt for tomorrow (1/18) at 1 pm.    INR (no units)   Date Value   12/07/2023 2.0        rehabilitation services/observation and assessment/teaching and training/medication teaching and assessment
